# Patient Record
Sex: MALE | Race: WHITE | Employment: OTHER | URBAN - METROPOLITAN AREA
[De-identification: names, ages, dates, MRNs, and addresses within clinical notes are randomized per-mention and may not be internally consistent; named-entity substitution may affect disease eponyms.]

---

## 2017-06-02 DIAGNOSIS — N18.30 CHRONIC KIDNEY DISEASE, STAGE III (MODERATE) (HCC): ICD-10-CM

## 2017-06-02 DIAGNOSIS — E55.9 VITAMIN D DEFICIENCY: ICD-10-CM

## 2017-06-02 DIAGNOSIS — R73.01 IFG (IMPAIRED FASTING GLUCOSE): ICD-10-CM

## 2017-06-02 DIAGNOSIS — E78.5 DYSLIPIDEMIA: ICD-10-CM

## 2017-06-02 RX ORDER — LISINOPRIL 5 MG/1
TABLET ORAL
Qty: 90 TAB | Refills: 2 | Status: SHIPPED | OUTPATIENT
Start: 2017-06-02 | End: 2018-02-22 | Stop reason: SDUPTHER

## 2017-06-14 ENCOUNTER — OFFICE VISIT (OUTPATIENT)
Dept: INTERNAL MEDICINE CLINIC | Age: 82
End: 2017-06-14

## 2017-06-14 VITALS
BODY MASS INDEX: 24.48 KG/M2 | TEMPERATURE: 97.6 F | WEIGHT: 171 LBS | RESPIRATION RATE: 16 BRPM | HEIGHT: 70 IN | HEART RATE: 78 BPM | SYSTOLIC BLOOD PRESSURE: 107 MMHG | DIASTOLIC BLOOD PRESSURE: 69 MMHG | OXYGEN SATURATION: 96 %

## 2017-06-14 DIAGNOSIS — K21.9 GASTROESOPHAGEAL REFLUX DISEASE WITHOUT ESOPHAGITIS: ICD-10-CM

## 2017-06-14 DIAGNOSIS — K59.04 CHRONIC IDIOPATHIC CONSTIPATION: ICD-10-CM

## 2017-06-14 DIAGNOSIS — E78.5 DYSLIPIDEMIA: ICD-10-CM

## 2017-06-14 DIAGNOSIS — C61 PROSTATE CANCER (HCC): ICD-10-CM

## 2017-06-14 DIAGNOSIS — M54.50 CHRONIC BILATERAL LOW BACK PAIN WITHOUT SCIATICA: ICD-10-CM

## 2017-06-14 DIAGNOSIS — G89.29 CHRONIC BILATERAL LOW BACK PAIN WITHOUT SCIATICA: ICD-10-CM

## 2017-06-14 DIAGNOSIS — N18.30 CHRONIC KIDNEY DISEASE, STAGE III (MODERATE) (HCC): Primary | ICD-10-CM

## 2017-06-14 RX ORDER — OXYCODONE AND ACETAMINOPHEN 7.5; 325 MG/1; MG/1
1 TABLET ORAL
Qty: 30 TAB | Refills: 0 | Status: SHIPPED | OUTPATIENT
Start: 2017-06-14 | End: 2019-04-08

## 2017-06-14 RX ORDER — TRAMADOL HYDROCHLORIDE 50 MG/1
TABLET ORAL
Qty: 540 TAB | Refills: 1 | Status: SHIPPED | OUTPATIENT
Start: 2017-06-14 | End: 2017-12-13 | Stop reason: SDUPTHER

## 2017-06-14 NOTE — MR AVS SNAPSHOT
Visit Information Date & Time Provider Department Dept. Phone Encounter #  
 6/14/2017  1:00 PM Rafat Frederick, [address] [place] [address],[address] [phone] [id number] Follow-up Instructions Return in about 6 months (around 12/18/2017). Upcoming Health Maintenance Date Due DTaP/Tdap/Td series (1 - Tdap) 6/17/1956 Pneumococcal 65+ High/Highest Risk (2 of 2 - PPSV23) 10/15/2015 GLAUCOMA SCREENING Q2Y 12/29/2016 INFLUENZA AGE 9 TO ADULT 8/1/2017 MEDICARE YEARLY EXAM 12/15/2017 Allergies as of 6/14/2017  Review Complete On: 6/14/2017 By: Rafat Frederick MD  
 No Known Allergies Current Immunizations  Reviewed on 6/14/2017 Name Date Influenza High Dose Vaccine PF 10/19/2016 Influenza Vaccine 10/21/2014, 9/27/2013 Influenza Vaccine (Quad) PF 10/15/2015 Influenza Vaccine Split 10/25/2012, 10/21/2011 10:54 AM, 10/15/2010 Pneumococcal Vaccine (Unspecified Type) 10/15/2010 Reviewed by Rafat Frederick MD on 6/14/2017 at  1:21 PM  
You Were Diagnosed With   
  
 Codes Comments Chronic kidney disease, stage III (moderate)    -  Primary ICD-10-CM: N18.3 ICD-9-CM: 814. 3 Chronic idiopathic constipation     ICD-10-CM: K59.04 
ICD-9-CM: 564.00 Gastroesophageal reflux disease without esophagitis     ICD-10-CM: K21.9 ICD-9-CM: 530.81 Prostate cancer [facility])     ICD-10-CM: U38 ICD-9-CM: 408 Dyslipidemia     ICD-10-CM: E78.5 ICD-9-CM: 272.4 Vitals BP Pulse Temp Resp Height(growth percentile) Weight(growth percentile) 107/69 (BP 1 Location: Left arm, BP Patient Position: Sitting) 78 97.6 °F (36.4 °C) (Oral) 16 5' 10\" (1.778 m) 171 lb (77.6 kg) SpO2 BMI Smoking Status 96% 24.54 kg/m2 Former Smoker Vitals History BMI and BSA Data Body Mass Index Body Surface Area 24.54 kg/m 2 1.96 m 2 Preferred Pharmacy Pharmacy Name Phone DINORA GUILLEN [facility] [address], [address] [place] [address] Arely Deacon [phone] Your Updated Medication List  
  
   
This list is accurate as of: 6/14/17  1:25 PM.  Always use your most recent med list.  
  
  
  
  
 acetaminophen 500 mg tablet Commonly known as:  TYLENOL Take 1,000 mg by mouth three (3) times daily. alum-mag hydroxide-simeth 200-200-20 mg/5 mL Susp Commonly known as:  MYLANTA Take 30 mL by mouth daily as needed. CLEAR EYES REDNESS RELIEF 0.012-0.2 % Drop Generic drug:  Naphazoline-Gly-Benzalk Chl  
Apply 2 Drops to eye daily. Both eyes  
  
 levocetirizine 5 mg tablet Commonly known as:  Jamse Irene TAKEK ONE TABLET BY MOUTH NIGHTLY  
  
 lisinopril 5 mg tablet Commonly known as:  PRINIVIL, ZESTRIL  
TAKE ONE TABLET BY MOUTH DAILY  
  
 omeprazole 20 mg capsule Commonly known as:  PRILOSEC Take 1 Cap by mouth two (2) times a day. oxyCODONE-acetaminophen 7.5-325 mg per tablet Commonly known as:  PERCOCET 7.5 Take 1 Tab by mouth every eight (8) hours as needed for Pain. Max Daily Amount: 3 Tabs. traMADol 50 mg tablet Commonly known as:  ULTRAM  
TAKE TWO TABLETS BY MOUTH THREE TIMES A DAY WITH MEALS Prescriptions Printed Refills  
 oxyCODONE-acetaminophen (PERCOCET 7.5) 7.5-325 mg per tablet 0 Sig: Take 1 Tab by mouth every eight (8) hours as needed for Pain. Max Daily Amount: 3 Tabs. Class: Print Route: Oral  
 traMADol (ULTRAM) 50 mg tablet 1 Sig: TAKE TWO TABLETS BY MOUTH THREE TIMES A DAY WITH MEALS Class: Print We Performed the Following CBC W/O DIFF [42369 CPT(R)] LIPID PANEL [28197 CPT(R)] METABOLIC PANEL, COMPREHENSIVE [41438 CPT(R)] TSH 3RD GENERATION [95970 CPT(R)] Follow-up Instructions Return in about 6 months (around 12/18/2017). Patient Instructions Call dr Theo Correa for follow up Introducing [facility]! Robbin martinez Thumbs Up patient portal. Now you can access parts of your medical record, email your doctor's office, and request medication refills online. 1. In your internet browser, go to [email or web address] 2. Click on the First Time User? Click Here link in the Sign In box. You will see the New Member Sign Up page. 3. Enter your Thumbs Up Access Code exactly as it appears below. You will not need to use this code after youve completed the sign-up process. If you do not sign up before the expiration date, you must request a new code. · Thumbs Up Access Code: [email or web address] Expires: 9/12/2017  1:23 PM 
 
4. Enter the last four digits of your Social Security Number (xxxx) and Date of Birth (mm/dd/yyyy) as indicated and click Submit. You will be taken to the next sign-up page. 5. Create a Thumbs Up ID. This will be your Thumbs Up login ID and cannot be changed, so think of one that is secure and easy to remember. 6. Create a Thumbs Up password. You can change your password at any time. 7. Enter your Password Reset Question and Answer. This can be used at a later time if you forget your password. 8. Enter your e-mail address. You will receive e-mail notification when new information is available in [address] 9. Click Sign Up. You can now view and download portions of your medical record. 10. Click the Download Summary menu link to download a portable copy of your medical information. If you have questions, please visit the Frequently Asked Questions section of the Thumbs Up website. Remember, Thumbs Up is NOT to be used for urgent needs. For medical emergencies, dial 911. Now available from your iPhone and Android! Please provide this summary of care documentation to your next provider. Your primary care clinician is listed as Rosanna Villanueva. If you have any questions after today's visit, please call [phone].

## 2017-06-14 NOTE — LETTER
AGREEMENT for controlled medication treatment Maggie Bo, have agreed to a course of treatment that includes taking controlled medication. For this treatment I designate _____________________________________ as the [facility] Provider.  The purpose of this agreement is to prevent misunderstandings about controlled medications I may be taking for treatment, and to comply with applicable laws. I understand this agreement is essential to the trust and confidence necessary in a provider-patient relationship and that the designated provider will treat me in accordance with the statements below. As part of my treatment I agree to the followin.  USE 
a. I will take the controlled medication as instructed by the designated provider and avoid improper use of controlled medications. b.   I will not share, sell or trade controlled medication with anyone as this is a criminal offense.  
c.   I will not use any illegal controlled substance, including marijuana, cocaine, etc. as this is a criminal offense. 2.  PROVIDERS 
a. I will only obtain controlled medication from the designated provider. b.   I will not attempt to obtain the same or similar controlled medications, such as opioid pain medication, stimulants, anti-anxiety or hypnotics from any other provider as this is a criminal offense. 
c.   I will inform the designated provider about all other licensed professionals providing medical care to me and authorize communication between all providers to coordinate care, particularly prescribing or dispensing of controlled medications. 3.  PHARMACY 
a.   I will only fill controlled medication prescriptions at the approved pharmacy as listed below. 4.  OFFICE VISITS 
a. I agree to attend scheduled office visit appointments. b.   I am aware my office visits may be monthly or as determined necessary by the designated provider. c.   I will communicate fully with the designated provider about the character and intensity of my symptoms, the effect of the symptoms on my daily life, and how well the controlled medication is helping to relieve the cause of my symptoms. 5.  REFILLS OR CALL-IN PRESCRIPTIONS OF CONTROLLED MEDICATIONS 
a. I agree that refills of my prescriptions for controlled medications will be made at the time of an office visit during regular office hours. No refills will be available during evenings or on weekends. Abusive or inappropriate behavior related to medication refills will not be tolerated. b.   I will not call the office repeatedly to inquire about my controlled medication. I understand that medications will be written on the due date and not before. Calling the office repeatedly will be considered harassment, and I may be discharged from the practice. c.   Controlled medications may not be called in for refill, but doing so is at the discretion of the designated provider. d.   I am aware that only I must  prescriptions for controlled medications at the office but the designated provider may allow a designee to  the prescription from the office under very specific circumstance that may develop. 6.  TOXICOLOGY SCREENING 
a. I agree to random urine toxicology screenings in order to comply with government and [address] [place] [address] regulations. I understand that I will be financially responsible for any charges incurred by this office, Mily Duncan of [facility] laboratory, [employer], or [facility] for the urine toxicology screening, which may not be covered by my insurance. Failure to do so will be considered non-compliance and I may be discharged. b. In some cases an oral swab or hair sample may be substituted for a urine screen. This is at the discretion of the designated provider. I understand that I will be financially responsible for any charges incurred as well. c.   I understand that if the urine toxicology does not show my medications prescribed to me by the designated provider, or it shows any illegal substance or any other medications NOT prescribed by the designated providers, I may be discharged from this practice at the discretion of the designated provider and no further controlled medications will be prescribed or follow-up appointments scheduled. Also, if any illegal substances are detected on the urine toxicology, this information may be provided to local law enforcement. 7. PILL COUNTS 
a. I am aware I may be called at any time to come into the office for a count of all my remaining controlled medications in order to help the designated provider understand the rate at which I use my controlled medications and to more effectively adjust dosage. b. I agree that I will use my medication at a rate NO greater than the prescribed rate and that use of my medication at a greater rate will result in my being without medication for the period of time until next expected due date. c. I will bring in the containers with the medication prescribed by all providers, including the designated provider, to each office visit even if there is no medication remaining. All controlled medication will be in the original containers from the pharmacy for each medication. Failure to do so will be considered non-compliance and I may be discharged from the practice. 8.  LOSS OR THEFT OF MEDICATION 
a. I will safeguard my controlled medication from loss or theft. b.   Lost or stolen controlled medication may not be replaced. This includes a prescription that has not yet been filled at the pharmacy. c.   In the event my controlled medications are stolen or lost, I will notify the designated providers office immediately.   If such event occurs during the night, weekend or holiday, I will leave a detailed message on the answering machine or answering service at the number listed above. 
d.   I will file and produce an official police report for any effort to replace controlled medications prescribed. 9.  AGENCY COLLABORATION I authorize the designated provider and the authorized pharmacy/pharmacist to cooperate fully with any city, state, or federal law enforcement agency in the investigation of any possible misuse, sale or other diversion of my controlled medication. 10.  TREATMENT I understand that if I violate any of the conditions, my controlled medication and/or treatment will be terminated. If the violation involves obtaining controlled substances and/or dangerous drugs from another source, the incident may be reported to other medical facilities and authorities, including law enforcement. In this case, the designated provider may taper off the medication over a period of several days, as necessary, to avoid withdrawal symptoms or will suggest alternate treatment facilities. Also, a drug dependence treatment program may be recommended. 11.  AGREEMENT I agree to follow these guidelines that have been fully explained to me. All of my questions and concerns regarding treatment have been adequately answered. A copy of this document has been given to me. I agree to use ______________________________ Authorized Pharmacy located at _________________________________________________________________ Telephone ________________________________for filling ALL controlled medication prescriptions. This agreement is entered into on 6/14/2017 Patient signature__________________________________________________________ Legal Guardian signature___________________________________________________ Provider signature_________________________________________________________ Witness signature_________________________________________________________

## 2017-06-14 NOTE — PROGRESS NOTES
HISTORY OF PRESENT ILLNESS  Ridge Erickson is a 80 y.o. male. HPI  Last here 12/14/16. Pt is here to f/u on chronic conditions. Pt is here with his wife today.      BP is good today 107/69  Continues on lisinopril 5mg to protect kidneys     Continues on prilosec BID and this controls sxs well , gets very rare breakthrough sx, only if he forgets to take it, takes it 30min prior to meals which works quite well  Has been taking prilosec BID for the past 2 years  Ham gives him heartburn unless he takes prilosec. Reviewed last labs 12/16  Will recheck labs today  Pt is not fasting  He will return tomorrow for labs     Pt no longer follows with Dr. Ange Mcnally for allergies, xyzol for allergies, works well overall, takes daily, needs rf, he is allergic to a lot of fungus and dog and cat hair.     Pt continues on tramadol 2 with each meal everyday for pain--6 per day   Gets 540 tabs per 3 months. Pt also has percocet to use prn for severe back pain, infrequently, has not needed much of this, rf last visit has only taken 3-4  He took 2 percocet last week d/t severe pain but did not like how it made him feel. He would like a prescription for 7.5 mg percocet. Lays on floor to help his back at times  He has occasional constipation that is improved with OTC stool softeners. Pt reports this works well, denies any SE--no sedation, no signs of abuse  Provided pt pain contract today - pt signed     Pt follows with Dr. Natacha Bryant annually for prostate cancer  Recall last note 3/30/16:  PSA undetectable, pt doing well no further uti. Of note, pt had prostate removed  He has not seen Dr. Natacha Bryant this year -- advised to f/u     Pt was referred to Dr. Lorraine Rodgers (rheum) at last visit, but has not followed up with her. Planning to schedule appointment. 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 He continues to take tramadol for this 6 tabs per day.  Gets 540 tabs for 3 months. --i prescribe this now, we addressed this last time and he stated he still gets pain and does not think the tramadol does anything but takes it for maintenance anyway            Recall he Has n dx of prostate cancer since January 2013 , sees calin annually and last saw him in January 2015--states he was released from care at that time.    Recall the patient has h/o ckd, last cr 1.5 continues to be stable. , prior was 1.77, uns 1.5-1.77  Recall that he Used to follow with rheumatologist dr Rama Aguilera years ago --currently not seeing anyone. Recall the patient has hansens and a h/o stomach ulcer, sees ry for this. Sx controlled with prilosec bid , he states he saw him last year or the year before, sx well controlled, only one or 2 breakthrough episodes.         PREVENTIVE:    Colonoscopy: 10/22/13, Dr. Tabitha Parada, repeat 10 years  EGD: 4/16/16,Dr Banerjee, repeat 3 years  PSA: 1/13 dx prostate cancer, Dr. Raul Romeo, 3/16  AAA screen: 10/10 negative  Tdap: declines    Pneumovax 10/10    prevnar 13: given today 6/14/17  Zostavax. Insurance wouldn't cover.  Will wait for now.    Flu shot oct 19 2016  Eye: Dr. Solis Lei 1/17   Lipids: 6/16   A1c: 6/16 5.9, 12/16 5.7    Patient Active Problem List    Diagnosis Date Noted    ACP (advance care planning) 12/11/2015    Prostate cancer ([facility] [address] 02/18/2013    Syncope 08/21/2011    Chronic kidney disease, stage III (moderate) 06/28/2011    Vitamin D deficiency 06/27/2011    DJD (degenerative joint disease), multiple sites 03/09/2010    GERD (gastroesophageal reflux disease) 03/09/2010    History of peptic ulcer disease 03/09/2010    BPH (benign prostatic hypertrophy) 03/09/2010    Perennial allergic rhinitis 03/09/2010     Current Outpatient Prescriptions   Medication Sig Dispense Refill    lisinopril (PRINIVIL, ZESTRIL) 5 mg tablet TAKE ONE TABLET BY MOUTH DAILY 90 Tab 2    levocetirizine (XYZAL) 5 mg tablet TAKEK ONE TABLET BY MOUTH NIGHTLY 90 Tab 2    traMADol (ULTRAM) 50 mg tablet TAKE TWO TABLETS BY MOUTH THREE TIMES A DAY WITH MEALS 540 Tab 1    omeprazole (PRILOSEC) 20 mg capsule Take 1 Cap by mouth two (2) times a day. 180 Cap 3    Naphazoline-Gly-Benzalk Chl (CLEAR EYES REDNESS RELIEF) 0.012-0.2 % drop Apply 2 Drops to eye daily. Both eyes      alum-mag hydroxide-simeth (MYLANTA) 200-200-20 mg/5 mL susp Take 30 mL by mouth daily as needed.  oxyCODONE-acetaminophen (PERCOCET 10)  mg per tablet Take 1 Tab by mouth every eight (8) hours as needed for Pain. Max Daily Amount: 3 Tabs. 25 Tab 0    acetaminophen (TYLENOL) 500 mg tablet Take 1,000 mg by mouth three (3) times daily.        Past Surgical History:   Procedure Laterality Date    ENDOSCOPY, COLON, DIAGNOSTIC  2006    by [facility] MD; normal    HX CATARACT REMOVAL  12/28/2014    both eyes    HX COLONOSCOPY  10/22/2013    HX ENDOSCOPY      Followed by Dr. Evin Evans (multiple)    HX HEENT  1970's    septoplasty    HX ORTHOPAEDIC  2000    right rotator cuff repair    HX ORTHOPAEDIC      right knee total replacement    HX PROSTATECTOMY  2010    green light laser by Dr. Otto Blevins HX UROLOGICAL  5/7/13    CYSTOSCOPY WITH OPTICAL INTERNAL URETHROTOMY, AND CRYOABLATION OF PROSTATE      Lab Results  Component Value Date/Time   WBC 6.1 06/14/2016 01:48 PM   HGB 13.1 06/14/2016 01:48 PM   HCT 40.0 06/14/2016 01:48 PM   PLATELET 439 64/40/3269 01:48 PM   MCV 96 06/14/2016 01:48 PM     Lab Results  Component Value Date/Time   Cholesterol, total 163 06/14/2016 01:48 PM   HDL Cholesterol 36 06/14/2016 01:48 PM   LDL, calculated 98 06/14/2016 01:48 PM   Triglyceride 144 06/14/2016 01:48 PM   CHOL/HDL Ratio 3.6 06/25/2010 08:36 AM     Lab Results  Component Value Date/Time   GFR est non-AA 40 12/14/2016 01:45 PM   GFR est AA 46 12/14/2016 01:45 PM   Creatinine 1.59 12/14/2016 01:45 PM   BUN 34 12/14/2016 01:45 PM   Sodium 142 12/14/2016 01:45 PM   Potassium 5.3 12/14/2016 01:45 PM   Chloride 103 12/14/2016 01:45 PM   CO2 25 12/14/2016 01:45 PM        Review of Systems   Respiratory: Negative for shortness of breath. Cardiovascular: Negative for chest pain. Physical Exam   Constitutional: He is oriented to person, place, and time. He appears well-developed and well-nourished. No distress. HENT:   Head: Normocephalic and atraumatic. Mouth/Throat: Oropharynx is clear and moist. No oropharyngeal exudate. Eyes: Conjunctivae and EOM are normal. Right eye exhibits no discharge. Left eye exhibits no discharge. Neck: Normal range of motion. Neck supple. No carotid bruits   Cardiovascular: Normal rate, regular rhythm, normal heart sounds and intact distal pulses. Exam reveals no gallop and no friction rub. No murmur heard. Pulmonary/Chest: Effort normal and breath sounds normal. No respiratory distress. He has no wheezes. He has no rales. He exhibits no tenderness. Musculoskeletal: He exhibits no edema, tenderness or deformity. Lymphadenopathy:     He has no cervical adenopathy. Neurological: He is alert and oriented to person, place, and time. He has normal reflexes. Coordination abnormal.   Skin: Skin is warm and dry. No rash noted. He is not diaphoretic. No erythema. No pallor. Psychiatric: He has a normal mood and affect. His behavior is normal.       ASSESSMENT and PLAN    ICD-10-CM ICD-9-CM    1. Chronic kidney disease, stage III (moderate)    Crt runs around 1.7-1.8 baseline. Will check metabolic panel today. Continue to monitor. S71.7 793.2 METABOLIC PANEL, COMPREHENSIVE      CBC W/O DIFF      LIPID PANEL      TSH 3RD GENERATION   2. Chronic idiopathic constipation    Improved with OTC stool softeners. Y38.77 065.78 METABOLIC PANEL, COMPREHENSIVE      CBC W/O DIFF      LIPID PANEL      TSH 3RD GENERATION   3. Gastroesophageal reflux disease without esophagitis    Takes prilosec BID. Last EGD was 4/16. Overall this controls his sxs.   H37.0 163.83 METABOLIC PANEL, COMPREHENSIVE      CBC W/O DIFF      LIPID PANEL      TSH 3RD GENERATION   4. Prostate cancer [facility])    Due to see Dr. Indira Garrett. Advised to schedule f/u. O25 064 METABOLIC PANEL, COMPREHENSIVE      CBC W/O DIFF      LIPID PANEL      TSH 3RD GENERATION   5. Dyslipidemia    Diet controlled. Check lipids today. T91.8 464.7 METABOLIC PANEL, COMPREHENSIVE      CBC W/O DIFF      LIPID PANEL      TSH 3RD GENERATION    6. Chronic back pain  Pt takes tramadol 6 tabs per day. Gets 540 tabs for 3 months supply. Pt signed pain contract today. Reviewed parameters of prescribing. Reviewed , no signs of abuse. Also has supply of percocet that he rarely uses. Currently prescribed 10mg oxycodone, but would like a reduced dose of 7.5mg. I have ordered this. Written by Anmol Bacon, as dictated by Carlita Leonard MD.     Current diagnosis and concerns discussed with pt at length. Understands risks and benefits or current treatment plan and medications and accepts the treatment and medication with any possible risks.   Pt asks appropriate questions which were answered.   Pt instructed to call with any concerns or problems. This note will not be viewable in [address]

## 2017-06-15 ENCOUNTER — APPOINTMENT (OUTPATIENT)
Dept: INTERNAL MEDICINE CLINIC | Age: 82
End: 2017-06-15

## 2017-06-16 LAB
ALBUMIN SERPL-MCNC: 4.5 G/DL (ref 3.5–4.7)
ALBUMIN/GLOB SERPL: 1.8 {RATIO} (ref 1.2–2.2)
ALP SERPL-CCNC: 84 IU/L (ref 39–117)
ALT SERPL-CCNC: 12 IU/L (ref 0–44)
AST SERPL-CCNC: 19 IU/L (ref 0–40)
BILIRUB SERPL-MCNC: 0.7 MG/DL (ref 0–1.2)
BUN SERPL-MCNC: 39 MG/DL (ref 8–27)
BUN/CREAT SERPL: 23 (ref 10–24)
CALCIUM SERPL-MCNC: 9.5 MG/DL (ref 8.6–10.2)
CHLORIDE SERPL-SCNC: 103 MMOL/L (ref 96–106)
CHOLEST SERPL-MCNC: 168 MG/DL (ref 100–199)
CO2 SERPL-SCNC: 21 MMOL/L (ref 18–29)
CREAT SERPL-MCNC: 1.71 MG/DL (ref 0.76–1.27)
ERYTHROCYTE [DISTWIDTH] IN BLOOD BY AUTOMATED COUNT: 13.4 % (ref 12.3–15.4)
GLOBULIN SER CALC-MCNC: 2.5 G/DL (ref 1.5–4.5)
GLUCOSE SERPL-MCNC: 94 MG/DL (ref 65–99)
HCT VFR BLD AUTO: 40.7 % (ref 37.5–51)
HDLC SERPL-MCNC: 43 MG/DL
HGB BLD-MCNC: 13.5 G/DL (ref 12.6–17.7)
LDLC SERPL CALC-MCNC: 103 MG/DL (ref 0–99)
MCH RBC QN AUTO: 32 PG (ref 26.6–33)
MCHC RBC AUTO-ENTMCNC: 33.2 G/DL (ref 31.5–35.7)
MCV RBC AUTO: 96 FL (ref 79–97)
PLATELET # BLD AUTO: 186 X10E3/UL (ref 150–379)
POTASSIUM SERPL-SCNC: 4.9 MMOL/L (ref 3.5–5.2)
PROT SERPL-MCNC: 7 G/DL (ref 6–8.5)
RBC # BLD AUTO: 4.22 X10E6/UL (ref 4.14–5.8)
SODIUM SERPL-SCNC: 142 MMOL/L (ref 134–144)
TRIGL SERPL-MCNC: 111 MG/DL (ref 0–149)
TSH SERPL DL<=0.005 MIU/L-ACNC: 0.39 UIU/ML (ref 0.45–4.5)
VLDLC SERPL CALC-MCNC: 22 MG/DL (ref 5–40)
WBC # BLD AUTO: 7.6 X10E3/UL (ref 3.4–10.8)

## 2017-06-17 LAB
SPECIMEN STATUS REPORT, ROLRST: NORMAL
T4 FREE SERPL-MCNC: 1.29 NG/DL (ref 0.82–1.77)

## 2017-06-19 NOTE — PROGRESS NOTES
Let him know kidney fxn stable    Subclinical hyperthyroid stable--will monitor no meds or additional tx needed

## 2017-06-20 NOTE — PROGRESS NOTES
Called and spoke to pt. Two identifiers confirmed. Reported to pt that kidney fxn is stable and hyperthyroid is stable, no need for med or additional tx needed. Pt confirmed understanding. Pt had no further questions.

## 2017-12-12 ENCOUNTER — DOCUMENTATION ONLY (OUTPATIENT)
Dept: INTERNAL MEDICINE CLINIC | Age: 82
End: 2017-12-12

## 2017-12-12 NOTE — PROGRESS NOTES
Medicare Part B Preventive Services  Limitations  Recommendation  Scheduled    Bone Mass Measurement  (age 72 & older, biennial)  Requires diagnosis related to osteoporosis or estrogen deficiency. Biennial benefit unless patient has history of long-term glucocorticoid tx or baseline is needed because initial test was by other method  N/A  N/A    Cardiovascular Screening Blood Tests (every 5 years)  Total cholesterol, HDL, Triglycerides  Order as a panel if possible  Completed 6/2017    Recommended annually  Due 6/2018   Colorectal Cancer Screening  -Fecal occult blood test (annual)  -Flexible sigmoidoscopy (5y)  -Screening colonoscopy (10y)  -Barium Enema    Completed 10/2013 with Dr. Uyen Olsen    Recommended in 10 years  Due 10/2023    Counseling to Prevent Tobacco Use (up to 8 sessions per year)  - Counseling greater than 3 and up to 10 minutes  - Counseling greater than 10 minutes  Patients must be asymptomatic of tobacco-related conditions to receive as preventive service  N/A  N/A    Diabetes Screening Tests (at least every 3 years, Medicare covers annually or at 6-month intervals for prediabetic patients)     Fasting blood sugar (FBS) or glucose tolerance test (GTT)  Patient must be diagnosed with one of the following:  -Hypertension, Dyslipidemia, obesity, previous impaired FBS or GTT  Or any two of the following: overweight, FH of diabetes, age ? 72, history of gestational diabetes, birth of baby weighing more than 9 pounds  Completed 6/2017 with GLU 94    Recommended every 3 years for non-diabetics  Due 6/2018    Typically checked annually   Diabetes Self-Management Training (DSMT) (no USPSTF recommendation)  Requires referral by treating physician for patient with diabetes or renal disease. 10 hours of initial DSMT session of no less than 30 minutes each in a continuous 12-month period. 2 hours of follow-up DSMT in subsequent years.   N/A  N/A    Glaucoma Screening (no USPSTF recommendation)  Diabetes mellitus, family history, , age 48 or over,  American, age 72 or over  Completed 1/2017 with Dr. Jared Lockhart    Recommended annually  Due 1/2018   Human Immunodeficiency Virus (HIV) Screening (annually for increased risk patients)  HIV-1 and HIV-2 by EIA, ALFREDO, rapid antibody test, or oral mucosa transudate  Patient must be at increased risk for HIV infection per USPSTF guidelines or pregnant. Tests covered annually for patients at increased risk. Pregnant patients may receive up to 3 test during pregnancy. N/A  N/A    Medical Nutrition Therapy (MNT) (for diabetes or renal disease not recommended schedule)  Requires referral by treating physician for patient with diabetes or renal disease. Can be provided in same year as diabetes self-management training (DSMT), and CMS recommends medical nutrition therapy take place after DSMT. Up to 3 hours for initial year and 2 hours in subsequent years. N/A  N/A    Prostate Cancer Screening (annually up to age 76)  - Digital rectal exam (ISAEL)  - Prostate specific antigen (PSA)  Annually (age 48 or over), ISAEL not paid separately when covered E/M service is provided on same date  Completed 3/2016 with Dr. Margy Drake  Due as directed by Dr. Claudette Rodriguez Vaccination (annually)    Completed 10/2016    Recommended annually  Due Fall 2017   Pneumococcal Vaccination (once after 72)    Pneumovax - 10/2010    Prevnar 13 - 6/2017    Both recommended once over the age of 72  Completed     Completed    Hepatitis B Vaccinations (if medium/high risk)  Medium/high risk factors: End-stage renal disease,  Hemophiliacs who received Factor VIII or IX concentrates, Clients of institutions for the mentally retarded, Persons who live in the same house as a HepB virus carrier, Homosexual men, Illicit injectable drug abusers. N/A  N/A    Screening Mammography (biennial age 54-69)?   Annually (age 36 or over)  N/A N/A    Screening Pap Tests and Pelvic Examination (up to age 79 and after 79 if unknown history or abnormal study last 10 years)  Every 24 months except high risk  N/A  N/A    Ultrasound Screening for Abdominal Aortic Aneurysm (AAA) (once)  Patient must be referred through IPPE and not have had a screening for abdominal aortic aneurysm before under Medicare.  Limited to patients who meet one of the following criteria:  - Men who are 73-68 years old and have smoked more than 100 cigarettes in their lifetime.  -Anyone with a FH of AAA  -Anyone recommended for screening by USPSTF  Completed 10/2010 - negative for AAA  Completed

## 2017-12-13 ENCOUNTER — OFFICE VISIT (OUTPATIENT)
Dept: INTERNAL MEDICINE CLINIC | Age: 82
End: 2017-12-13

## 2017-12-13 VITALS
HEIGHT: 70 IN | HEART RATE: 72 BPM | TEMPERATURE: 97.8 F | WEIGHT: 170 LBS | OXYGEN SATURATION: 98 % | BODY MASS INDEX: 24.34 KG/M2 | SYSTOLIC BLOOD PRESSURE: 134 MMHG | RESPIRATION RATE: 16 BRPM | DIASTOLIC BLOOD PRESSURE: 73 MMHG

## 2017-12-13 DIAGNOSIS — R35.0 URINARY FREQUENCY: ICD-10-CM

## 2017-12-13 DIAGNOSIS — C61 PROSTATE CANCER (HCC): ICD-10-CM

## 2017-12-13 DIAGNOSIS — E78.5 DYSLIPIDEMIA: ICD-10-CM

## 2017-12-13 DIAGNOSIS — Z23 ENCOUNTER FOR IMMUNIZATION: ICD-10-CM

## 2017-12-13 DIAGNOSIS — J30.89 PERENNIAL ALLERGIC RHINITIS: ICD-10-CM

## 2017-12-13 DIAGNOSIS — Z00.00 MEDICARE ANNUAL WELLNESS VISIT, SUBSEQUENT: ICD-10-CM

## 2017-12-13 DIAGNOSIS — K59.04 CHRONIC IDIOPATHIC CONSTIPATION: ICD-10-CM

## 2017-12-13 DIAGNOSIS — I10 ESSENTIAL HYPERTENSION: ICD-10-CM

## 2017-12-13 DIAGNOSIS — E55.9 VITAMIN D DEFICIENCY: ICD-10-CM

## 2017-12-13 DIAGNOSIS — E05.90 HYPERTHYROIDISM: ICD-10-CM

## 2017-12-13 DIAGNOSIS — K21.9 GASTROESOPHAGEAL REFLUX DISEASE WITHOUT ESOPHAGITIS: ICD-10-CM

## 2017-12-13 DIAGNOSIS — N18.30 CHRONIC KIDNEY DISEASE, STAGE III (MODERATE) (HCC): Primary | ICD-10-CM

## 2017-12-13 RX ORDER — LEVOCETIRIZINE DIHYDROCHLORIDE 5 MG/1
TABLET, FILM COATED ORAL
Qty: 90 TAB | Refills: 2 | Status: SHIPPED | OUTPATIENT
Start: 2017-12-13 | End: 2018-08-31 | Stop reason: SDUPTHER

## 2017-12-13 RX ORDER — TRAMADOL HYDROCHLORIDE 50 MG/1
TABLET ORAL
Qty: 540 TAB | Refills: 1 | Status: SHIPPED | OUTPATIENT
Start: 2017-12-13 | End: 2018-08-31 | Stop reason: SDUPTHER

## 2017-12-13 RX ORDER — OMEPRAZOLE 20 MG/1
20 CAPSULE, DELAYED RELEASE ORAL 2 TIMES DAILY
Qty: 180 CAP | Refills: 3 | Status: SHIPPED | OUTPATIENT
Start: 2017-12-13 | End: 2019-04-08

## 2017-12-13 RX ORDER — LACTULOSE 10 G/15ML
1 SOLUTION ORAL; RECTAL 3 TIMES DAILY
Qty: 480 ML | Refills: 1 | Status: SHIPPED | OUTPATIENT
Start: 2017-12-13 | End: 2018-12-18 | Stop reason: SDUPTHER

## 2017-12-13 NOTE — PATIENT INSTRUCTIONS
Medicare Part B Preventive Services  Limitations  Recommendation  Scheduled    Bone Mass Measurement  (age 72 & older, biennial)  Requires diagnosis related to osteoporosis or estrogen deficiency. Biennial benefit unless patient has history of long-term glucocorticoid tx or baseline is needed because initial test was by other method  N/A  N/A    Cardiovascular Screening Blood Tests (every 5 years)  Total cholesterol, HDL, Triglycerides  Order as a panel if possible  Completed 6/2017     Recommended annually  Due 6/2018   Colorectal Cancer Screening  -Fecal occult blood test (annual)  -Flexible sigmoidoscopy (5y)  -Screening colonoscopy (10y)  -Barium Enema     Completed 10/2013 with Dr. Archana Carvalho     Recommended in 10 years  Due 10/2023    Counseling to Prevent Tobacco Use (up to 8 sessions per year)  - Counseling greater than 3 and up to 10 minutes  - Counseling greater than 10 minutes  Patients must be asymptomatic of tobacco-related conditions to receive as preventive service  N/A  N/A    Diabetes Screening Tests (at least every 3 years, Medicare covers annually or at 6-month intervals for prediabetic patients)      Fasting blood sugar (FBS) or glucose tolerance test (GTT)  Patient must be diagnosed with one of the following:  -Hypertension, Dyslipidemia, obesity, previous impaired FBS or GTT  Or any two of the following: overweight, FH of diabetes, age ? 72, history of gestational diabetes, birth of baby weighing more than 9 pounds  Completed 6/2017 with GLU 94     Recommended every 3 years for non-diabetics  Due 6/2018     Typically checked annually   Diabetes Self-Management Training (DSMT) (no USPSTF recommendation)  Requires referral by treating physician for patient with diabetes or renal disease. 10 hours of initial DSMT session of no less than 30 minutes each in a continuous 12-month period. 2 hours of follow-up DSMT in subsequent years.   N/A  N/A    Glaucoma Screening (no USPSTF recommendation)  Diabetes mellitus, family history, , age 48 or over,  American, age 72 or over  Completed 1/2017 with Dr. Selina Lugo     Recommended annually  Due 1/2018   Human Immunodeficiency Virus (HIV) Screening (annually for increased risk patients)  HIV-1 and HIV-2 by EIA, ALFREDO, rapid antibody test, or oral mucosa transudate  Patient must be at increased risk for HIV infection per USPSTF guidelines or pregnant. Tests covered annually for patients at increased risk. Pregnant patients may receive up to 3 test during pregnancy. N/A  N/A    Medical Nutrition Therapy (MNT) (for diabetes or renal disease not recommended schedule)  Requires referral by treating physician for patient with diabetes or renal disease. Can be provided in same year as diabetes self-management training (DSMT), and CMS recommends medical nutrition therapy take place after DSMT. Up to 3 hours for initial year and 2 hours in subsequent years. N/A  N/A    Prostate Cancer Screening (annually up to age 76)  - Digital rectal exam (ISAEL)  - Prostate specific antigen (PSA)  Annually (age 48 or over), ISAEL not paid separately when covered E/M service is provided on same date  Completed 3/2016 with Dr. Shonda Greco  Due as directed by Dr. Anais Nevarez Vaccination (annually)     Completed 12/17     Recommended annually  Due Fall 2018   Pneumococcal Vaccination (once after 65)     Pneumovax - 10/2010     Prevnar 13 - 6/2017     Both recommended once over the age of 72  Completed      Completed    Hepatitis B Vaccinations (if medium/high risk)  Medium/high risk factors: End-stage renal disease,  Hemophiliacs who received Factor VIII or IX concentrates, Clients of institutions for the mentally retarded, Persons who live in the same house as a HepB virus carrier, Homosexual men, Illicit injectable drug abusers. N/A  N/A    Screening Mammography (biennial age 54-69)?   Annually (age 36 or over)  N/A N/A    Screening Pap Tests and Pelvic Examination (up to age 79 and after 79 if unknown history or abnormal study last 10 years)  Every 24 months except high risk  N/A  N/A    Ultrasound Screening for Abdominal Aortic Aneurysm (AAA) (once)  Patient must be referred through IPPE and not have had a screening for abdominal aortic aneurysm before under Medicare. Limited to patients who meet one of the following criteria:  - Men who are 73-68 years old and have smoked more than 100 cigarettes in their lifetime.  -Anyone with a FH of AAA  -Anyone recommended for screening by USPSTF  Completed 10/2010 - negative for AAA  Completed         Please bring in a copy of your advanced directive to your next office visit so we can have a copy on file. Medicare Wellness Visit, Male    The best way to live healthy is to have a healthy lifestyle by eating a well-balanced diet, exercising regularly, limiting alcohol and stopping smoking. Regular physical exams and screening tests are another way to keep healthy. Preventive exams provided by your health care provider can find health problems before they become diseases or illnesses. Preventive services including immunizations, screening tests, monitoring and exams can help you take care of your own health. All people over age 72 should have a pneumovax  and and a prevnar shot to prevent pneumonia. These are once in a lifetime unless you and your provider decide differently. All people over 65 should have a yearly flu shot and a tetanus vaccine every 10 years. Screening for diabetes mellitus with a blood sugar test should be done every year. Glaucoma is a disease of the eye due to increased ocular pressure that can lead to blindness and it should be done every year by an eye professional.    Cardiovascular screening tests that check for elevated lipids (fatty part of blood) which can lead to heart disease and strokes should be done every 5 years.     Colorectal screening that evaluates for blood or polyps in your colon should be done yearly as a stool test or every five years as a flexible sigmoidoscope or every 10 years as a colonoscopy up to age 76. Men up to age 76 may need a screening blood test for prostate cancer at certain intervals, depending on their personal and family history. This decision is between the patient and his provider. If you have been a smoker or had family history of abdominal aortic aneurysms, you and your provider may decide to schedule an ultrasound test of your aorta. Hepatitis C screening is also recommended for anyone born between [birth year] through [address]. A shingles vaccine is also recommended once in a lifetime after age 61. Your Medicare Wellness Exam is recommended annually.     Here is a list of your current Health Maintenance items with a due date:  Health Maintenance Due   Topic Date Due    DTaP/Tdap/Td  (1 - Tdap) [birth year]/1956    Pneumococcal Vaccine (2 of 2 - PPSV23) 10/15/2015    Glaucoma Screening   12/29/2016    Flu Vaccine  08/01/2017

## 2017-12-13 NOTE — PROGRESS NOTES
This is a Subsequent Medicare Annual Wellness Exam (AWV) (Performed 12 months after IPPE or effective date of Medicare Part B enrollment)    I have reviewed the patient's medical history in detail and updated the computerized patient record. History     Past Medical History:   Diagnosis Date    Arthritis     severe diffuse DJD    Calculus of kidney 1988    2 bouts    Cancer [facility])     prostate    Chronic kidney disease 2010 urinary obstruction diagnosed    due to obstructive uropathy and ? NSAID use long term    GERD (gastroesophageal reflux disease) 6/2009    with mild Chin's    Hematuria, microscopic 1970's ?    negative cysto and imaging    Other ill-defined conditions 2006    blood transfusion hx    Perennial allergic rhinitis     mold --per Dr. Ramiro Whipple    PUD (peptic ulcer disease) 2006    stomach    Syncope 8/21/2011      Past Surgical History:   Procedure Laterality Date    ENDOSCOPY, COLON, DIAGNOSTIC  2006    by Lucy IRELAND; normal    HX CATARACT REMOVAL  12/28/2014    both eyes    HX COLONOSCOPY  10/22/2013    HX ENDOSCOPY      Followed by Dr. Steve Damon (multiple)    HX HEENT  1970's    septoplasty    HX ORTHOPAEDIC  2000    right rotator cuff repair    HX ORTHOPAEDIC      right knee total replacement    HX PROSTATECTOMY  2010    green light laser by Dr. Raffi Longo  5/7/13    CYSTOSCOPY WITH OPTICAL INTERNAL URETHROTOMY, AND CRYOABLATION OF PROSTATE     Current Outpatient Prescriptions   Medication Sig Dispense Refill    oxyCODONE-acetaminophen (PERCOCET 7.5) 7.5-325 mg per tablet Take 1 Tab by mouth every eight (8) hours as needed for Pain. Max Daily Amount: 3 Tabs.  30 Tab 0    traMADol (ULTRAM) 50 mg tablet TAKE TWO TABLETS BY MOUTH THREE TIMES A DAY WITH MEALS 540 Tab 1    lisinopril (PRINIVIL, ZESTRIL) 5 mg tablet TAKE ONE TABLET BY MOUTH DAILY 90 Tab 2    levocetirizine (XYZAL) 5 mg tablet TAKEK ONE TABLET BY MOUTH NIGHTLY 90 Tab 2    omeprazole (PRILOSEC) 20 mg capsule Take 1 Cap by mouth two (2) times a day. 180 Cap 3    Naphazoline-Gly-Benzalk Chl (CLEAR EYES REDNESS RELIEF) 0.012-0.2 % drop Apply 2 Drops to eye daily. Both eyes      alum-mag hydroxide-simeth (MYLANTA) 200-200-20 mg/5 mL susp Take 30 mL by mouth daily as needed.  acetaminophen (TYLENOL) 500 mg tablet Take 1,000 mg by mouth three (3) times daily. No Known Allergies  Family History   Problem Relation Age of Onset    Heart Disease Mother     Stroke Mother     Hypertension Mother     Arthritis-osteo Mother      neck and spine; back surgeries x5    Dementia Father      Alzheimer's type    Pneumonia Father      cause of death   [address] [facility] Arthritis-osteo Sister     Other Son      Biospy (unsure where)    No Known Problems Daughter      Social History   Substance Use Topics    Smoking status: Former Smoker     Packs/day: 2.00     Years: 10.00     Types: Cigarettes     Quit date: 2/1/1961    Smokeless tobacco: Never Used      Comment: When quit - over 3ppd    Alcohol use No      Comment: quit completely in 2008      Patient Active Problem List   Diagnosis Code    DJD (degenerative joint disease), multiple sites M15.9    GERD (gastroesophageal reflux disease) K21.9    History of peptic ulcer disease Z87.11    BPH (benign prostatic hypertrophy) N40.0    Perennial allergic rhinitis J30.89    Vitamin D deficiency E55.9    Chronic kidney disease, stage III (moderate) N18.3    Syncope R55    Prostate cancer ([facility] [address] C61    ACP (advance care planning) Z71.89       Depression Risk Factor Screening:     PHQ over the last two weeks 12/13/2017   Little interest or pleasure in doing things Not at all   Feeling down, depressed or hopeless Not at all   Total Score PHQ 2 0     Alcohol Risk Factor Screening: You do not drink alcohol or very rarely. Functional Ability and Level of Safety:   Hearing Loss  Hearing is good.     Activities of Daily Living  The home contains: no safety equipment. Patient does total self care    Fall RiskFall Risk Assessment, last 12 mths 12/13/2017   Able to walk? Yes   Fall in past 12 months? No       Abuse Screen  Patient is not abused  Lives with wife with dementia  Cognitive Screening   Evaluation of Cognitive Function:  Has your family/caregiver stated any concerns about your memory: no  Normal    Patient Care Team   Patient Care Team:  Adelina Bowles MD as PCP - General (Internal Medicine)  Brittni Duncan (Dental General Practice)  Kameron Velasco MD (Urology)  Zunilda Orellana MD (Orthopedic Surgery)  Christophe Mcclure (Dermatology)  Radha Crow MD (Gastroenterology)  Tea Funez MD (Optometry)     updated    Assessment/Plan   Education and counseling provided:  Are appropriate based on today's review and evaluation  End-of-Life planning (with patient's consent)  Influenza Vaccine  Prostate cancer screening tests (PSA, covered annually)  Colorectal cancer screening tests  Cardiovascular screening blood test  Screening for glaucoma  Diabetes screening test    Diagnoses and all orders for this visit:    1. Medicare annual wellness visit, subsequent      Health Maintenance Due   Topic Date Due    DTaP/Tdap/Td series (1 - Tdap) 06/17/1956    Pneumococcal 65+ High/Highest Risk (2 of 2 - PPSV23) 10/15/2015    GLAUCOMA SCREENING Q2Y  12/29/2016    Influenza Age 9 to Adult  08/01/2017     Discussed with patient about advance medical directive. Son Sharon Lee is his  sdm Provided patient blank AMD and Your Right to Decide Booklet. Requested that if completed to provide a copy of AMD to office.        Colonoscopy: 10/22/13, Dr. Alejandrina Erickson, repeat 10 years    AAA screen: 10/10, negative    Tdap: declines    Pneumovax 10/10    prevnar 13: 6/14/17  Zostavax: declines for now d/t lack of insurance coverage   Flu shot: 12/13/17     Eye: Dr. Michael Guzman, 1/17, due 1/18    Lipids: 6/17  annually  PSA: 3/16 due now  A1c:  12/16 5.7 due now        Medication reconciliation completed by MA and reviewed by me. Medical/surgical/social/family history reviewed and updated by me. Patient provided AVS and preventative screening table. Patient verbalized understanding of all information discussed.

## 2017-12-13 NOTE — MR AVS SNAPSHOT
Visit Information Date & Time Provider Department Dept. Phone Encounter #  
 12/13/2017  1:00 PM Yann Sanders, [address] [id number] Follow-up Instructions Return in about 6 months (around 6/13/2018). Upcoming Health Maintenance Date Due DTaP/Tdap/Td series (1 - Tdap) 6[birth year]17/1956 Pneumococcal 65+ High/Highest Risk (2 of 2 - PPSV23) 10/15/2015 GLAUCOMA SCREENING Q2Y 12/29/2016 Influenza Age 5 to Adult 8/1/2017 MEDICARE YEARLY EXAM 12/15/2017 Allergies as of 12/13/2017  Review Complete On: 12/13/2017 By: Grzegorz Felix LPN No Known Allergies Current Immunizations  Reviewed on 6/14/2017 Name Date Influenza High Dose Vaccine PF 10/19/2016 Influenza Vaccine 10/21/2014, 9/27/2013 Influenza Vaccine (Quad) PF 10/15/2015 Influenza Vaccine Split 10/25/2012, 10/21/2011 10:54 AM, 10/15/2010 ZZZ-RETIRED (DO NOT USE) Pneumococcal Vaccine (Unspecified Type) 10/15/2010 Not reviewed this visit You Were Diagnosed With   
  
 Codes Comments Chronic kidney disease, stage III (moderate)    -  Primary ICD-10-CM: N18.3 ICD-9-CM: 324. 3 Medicare annual wellness visit, subsequent     ICD-10-CM: Z00.00 ICD-9-CM: V70.0 Chronic idiopathic constipation     ICD-10-CM: K59.04 
ICD-9-CM: 564.00 Gastroesophageal reflux disease without esophagitis     ICD-10-CM: K21.9 ICD-9-CM: 530.81 Prostate cancer [facility])     ICD-10-CM: E15 ICD-9-CM: 867 Perennial allergic rhinitis     ICD-10-CM: J30.89 ICD-9-CM: 477.8 Essential hypertension     ICD-10-CM: I10 
ICD-9-CM: 401.9 Hyperthyroidism     ICD-10-CM: E05.90 ICD-9-CM: 242.90 Vitamin D deficiency     ICD-10-CM: E55.9 ICD-9-CM: 268.9 Dyslipidemia     ICD-10-CM: E78.5 ICD-9-CM: 272.4 Urinary frequency     ICD-10-CM: R35.0 ICD-9-CM: 788.41 Vitals BP Pulse Temp Resp Height(growth percentile) Weight(growth percentile) 134/73 (BP 1 Location: Left arm, BP Patient Position: Sitting) 72 97.8 °F (36.6 °C) (Oral) 16 5' 10\" (1.778 m) 170 lb (77.1 kg) SpO2 BMI Smoking Status 98% 24.39 kg/m2 Former Smoker Vitals History BMI and BSA Data Body Mass Index Body Surface Area  
 24.39 kg/m 2 1.95 m 2 Preferred Pharmacy Pharmacy Name Phone [facility] [address] [place], [address] [facility] Sarita Ackerman [phone] Your Updated Medication List  
  
   
This list is accurate as of: 12/13/17  1:30 PM.  Always use your most recent med list.  
  
  
  
  
 acetaminophen 500 mg tablet Commonly known as:  TYLENOL Take 1,000 mg by mouth three (3) times daily. alum-mag hydroxide-simeth 200-200-20 mg/5 mL Susp Commonly known as:  MYLANTA Take 30 mL by mouth daily as needed. CLEAR EYES REDNESS RELIEF 0.012-0.2 % Drop Generic drug:  Naphazoline-Gly-Benzalk Chl  
Apply 2 Drops to eye daily. Both eyes  
  
 lactulose 10 gram/15 mL solution Commonly known as:  Amna Kitten Take 5 mL by mouth three (3) times daily. levocetirizine 5 mg tablet Commonly known as:  Aj Dakins TAKEK ONE TABLET BY MOUTH NIGHTLY  
  
 lisinopril 5 mg tablet Commonly known as:  PRINIVIL, ZESTRIL  
TAKE ONE TABLET BY MOUTH DAILY  
  
 omeprazole 20 mg capsule Commonly known as:  PRILOSEC Take 1 Cap by mouth two (2) times a day. oxyCODONE-acetaminophen 7.5-325 mg per tablet Commonly known as:  PERCOCET 7.5 Take 1 Tab by mouth every eight (8) hours as needed for Pain. Max Daily Amount: 3 Tabs. traMADol 50 mg tablet Commonly known as:  ULTRAM  
TAKE TWO TABLETS BY MOUTH THREE TIMES A DAY WITH MEALS Prescriptions Printed Refills  
 traMADol (ULTRAM) 50 mg tablet 1 Sig: TAKE TWO TABLETS BY MOUTH THREE TIMES A DAY WITH MEALS Class: Print Prescriptions Sent to Pharmacy  Refills  
 omeprazole (PRILOSEC) 20 mg capsule 3  
 Sig: Take 1 Cap by mouth two (2) times a day. Class: Normal  
 Pharmacy: Laquita Murphy [address] [place] [address], [address] [facility] [address] [facility]  [address] Ph #: [phone] Route: Oral  
 levocetirizine (XYZAL) 5 mg tablet 2 Sig: TAKEK ONE TABLET BY MOUTH NIGHTLY Class: Normal  
 Pharmacy: Laquita Jeffrey[address], [address] Ph #: [phone]  
 lactulose (CHRONULAC) 10 gram/15 mL solution 1 Sig: Take 5 mL by mouth three (3) times daily. Class: Normal  
 Pharmacy: Laquita Newler [address], [address] [facility] [address] [facility]  [address] Ph #: [phone] Route: Oral  
  
We Performed the Following CBC W/O DIFF [87992 CPT(R)] LIPID PANEL [74986 CPT(R)] METABOLIC PANEL, COMPREHENSIVE [99396 CPT(R)] PSA, DIAGNOSTIC (PROSTATE SPECIFIC AG) [id number] CPT(R)] T4, FREE [id number] CPT(R)] TSH 3RD GENERATION [49881 CPT(R)] VITAMIN D, 25 HYDROXY [id number] CPT(R)] Follow-up Instructions Return in about 6 months (around 6/13/2018). Patient Instructions Medicare Part B Preventive Services  Limitations  Recommendation  Scheduled Bone Mass Measurement 
(age 72 & older, biennial)  Requires diagnosis related to osteoporosis or estrogen deficiency. Biennial benefit unless patient has history of long-term glucocorticoid tx or baseline is needed because initial test was by other method  N/A  N/A Cardiovascular Screening Blood Tests (every 5 years) Total cholesterol, HDL, Triglycerides  Order as a panel if possible  Completed 6/2017 
  
Recommended annually  Due 6/2018 Colorectal Cancer Screening 
-Fecal occult blood test (annual) -Flexible sigmoidoscopy (5y) 
-Screening colonoscopy (10y) -Barium Enema     Completed 10/2013 with Dr. Noé Bedoya 
  
Recommended in 10 years  Due 10/2023 Counseling to Prevent Tobacco Use (up to 8 sessions per year) - Counseling greater than 3 and up to 10 minutes - Counseling greater than 10 minutes  Patients must be asymptomatic of tobacco-related conditions to receive as preventive service  N/A  N/A Diabetes Screening Tests (at least every 3 years, Medicare covers annually or at 6-month intervals for prediabetic patients) 
   
Fasting blood sugar (FBS) or glucose tolerance test (GTT)  Patient must be diagnosed with one of the following: 
-Hypertension, Dyslipidemia, obesity, previous impaired FBS or GTT 
Or any two of the following: overweight, FH of diabetes, age ? 72, history of gestational diabetes, birth of baby weighing more than 9 pounds  Completed 6/2017 with GLU 94 
  
Recommended every 3 years for non-diabetics  Due 6/2018 
  
Typically checked annually Diabetes Self-Management Training (DSMT) (no USPSTF recommendation)  Requires referral by treating physician for patient with diabetes or renal disease. 10 hours of initial DSMT session of no less than 30 minutes each in a continuous 12-month period. 2 hours of follow-up DSMT in subsequent years. N/A  N/A Glaucoma Screening (no USPSTF recommendation)  Diabetes mellitus, family history, , age 48 or over,  American, age 72 or over  Completed 1/2017 with Dr. Lio Ramos 
  
Recommended annually  Due 1/2018 Human Immunodeficiency Virus (HIV) Screening (annually for increased risk patients) HIV-1 and HIV-2 by EIA, ALFREDO, rapid antibody test, or oral mucosa transudate  Patient must be at increased risk for HIV infection per USPSTF guidelines or pregnant. Tests covered annually for patients at increased risk. Pregnant patients may receive up to 3 test during pregnancy. N/A  N/A Medical Nutrition Therapy (MNT) (for diabetes or renal disease not recommended schedule)  Requires referral by treating physician for patient with diabetes or renal disease.  Can be provided in same year as diabetes self-management training (DSMT), and CMS recommends medical nutrition therapy take place after DSMT. Up to 3 hours for initial year and 2 hours in subsequent years. N/A  N/A Prostate Cancer Screening (annually up to age 76) - Digital rectal exam (ISAEL) - Prostate specific antigen (PSA)  Annually (age 48 or over), ISAEL not paid separately when covered E/M service is provided on same date  Completed 3/2016 with Dr. Mukesh Miramontes  Due as directed by Dr. Mukesh Miramontes Seasonal Influenza Vaccination (annually)     Completed 12/17 
  
Recommended annually  Due Fall 2018 Pneumococcal Vaccination (once after 65)     Pneumovax - 10/2010 
  
Prevnar 13 - 6/2017 
  
Both recommended once over the age of 72  Completed  
  
Completed Hepatitis B Vaccinations (if medium/high risk)  Medium/high risk factors: End-stage renal disease, Hemophiliacs who received Factor VIII or IX concentrates, Clients of institutions for the mentally retarded, Persons who live in the same house as a HepB virus carrier, Homosexual men, Illicit injectable drug abusers. N/A  N/A Screening Mammography (biennial age 54-69)? Annually (age 36 or over)  N/A N/A Screening Pap Tests and Pelvic Examination (up to age 79 and after 79 if unknown history or abnormal study last 10 years)  Every 24 months except high risk  N/A  N/A Ultrasound Screening for Abdominal Aortic Aneurysm (AAA) (once)  Patient must be referred through IPPE and not have had a screening for abdominal aortic aneurysm before under Medicare. Limited to patients who meet one of the following criteria: 
- Men who are 73-68 years old and have smoked more than 100 cigarettes in their lifetime. 
-Anyone with a FH of AAA 
-Anyone recommended for screening by USPSTF  Completed 10/2010 - negative for AAA  Completed  
  
  
Please bring in a copy of your advanced directive to your next office visit so we can have a copy on file. Medicare Wellness Visit, Male The best way to live healthy is to have a healthy lifestyle by eating a well-balanced diet, exercising regularly, limiting alcohol and stopping smoking. Regular physical exams and screening tests are another way to keep healthy. Preventive exams provided by your health care provider can find health problems before they become diseases or illnesses. Preventive services including immunizations, screening tests, monitoring and exams can help you take care of your own health. All people over age 72 should have a pneumovax  and and a prevnar shot to prevent pneumonia. These are once in a lifetime unless you and your provider decide differently. All people over 65 should have a yearly flu shot and a tetanus vaccine every 10 years. Screening for diabetes mellitus with a blood sugar test should be done every year. Glaucoma is a disease of the eye due to increased ocular pressure that can lead to blindness and it should be done every year by an eye professional. 
 
Cardiovascular screening tests that check for elevated lipids (fatty part of blood) which can lead to heart disease and strokes should be done every 5 years. Colorectal screening that evaluates for blood or polyps in your colon should be done yearly as a stool test or every five years as a flexible sigmoidoscope or every 10 years as a colonoscopy up to age 76. Men up to age 76 may need a screening blood test for prostate cancer at certain intervals, depending on their personal and family history. This decision is between the patient and his provider. If you have been a smoker or had family history of abdominal aortic aneurysms, you and your provider may decide to schedule an ultrasound test of your aorta. Hepatitis C screening is also recommended for anyone born between [birth year] through [address]. A shingles vaccine is also recommended once in a lifetime after age 61. Your Medicare Wellness Exam is recommended annually. Here is a list of your current Health Maintenance items with a due date: Health Maintenance Due Topic Date Due  
 DTaP/Tdap/Td  (1 - Tdap) 06/17/1956  Pneumococcal Vaccine (2 of 2 - PPSV23) 10/15/2015  Glaucoma Screening   12/29/2016  Flu Vaccine  08/01/2017 Introducing [facility]! [facility] introduces [employer] patient portal. Now you can access parts of your medical record, email your doctor's office, and request medication refills online. 1. In your internet browser, go to [email or web address] 2. Click on the First Time User? Click Here link in the Sign In box. You will see the New Member Sign Up page. 3. Enter your [employer] Access Code exactly as it appears below. You will not need to use this code after youve completed the sign-up process. If you do not sign up before the expiration date, you must request a new code. · [employer] Access Code: [email or web address] Expires: 3/13/2018 12:58 PM 
 
4. Enter the last four digits of your Social Security Number (xxxx) and Date of Birth (mm/dd/yyyy) as indicated and click Submit. You will be taken to the next sign-up page. 5. Create a [employer] ID. This will be your [employer] login ID and cannot be changed, so think of one that is secure and easy to remember. 6. Create a [employer] password. You can change your password at any time. 7. Enter your Password Reset Question and Answer. This can be used at a later time if you forget your password. 8. Enter your e-mail address. You will receive e-mail notification when new information is available in [address] 9. Click Sign Up. You can now view and download portions of your medical record. 10. Click the Download Summary menu link to download a portable copy of your medical information. If you have questions, please visit the Frequently Asked Questions section of the [employer] website. Remember, [employer] is NOT to be used for urgent needs. For medical emergencies, dial 911. Now available from your iPhone and Android! Please provide this summary of care documentation to your next provider. Your primary care clinician is listed as Sirisha Tatum. If you have any questions after today's visit, please call [phone].

## 2017-12-13 NOTE — PROGRESS NOTES
HISTORY OF PRESENT ILLNESS  Bessie Ledesma is a 80 y.o. male. HPI   Last here 6/14/17. Pt is here to f/u on chronic conditions. Pt presents with his wife.  Kathy Maria Esther  BP is 134/73  Pt has not been monitoring his BP at home   Continues on lisinopril 5mg daily to protect his kidneys    Wt is down 1 lb since last visit  Discussed diet and weight loss     Reviewed last labs 6/17: cr 1.7-1.8, thyroid running too fast (on first test), thyroid nl (on second test)  Ordered labs to complete prior to next visit   John Salmon on prilosec 20mg BID for reflux - works well  Pt sometimes only takes prilosec one tab daily - works well  Pt rarely gets breakthrough sx (only if he forgets to take this medication)  Pt tries to avoid trigger foods like ham and [place] food    Continues on xyzol daily for allergies - works well  Recall pt is allergic to fungus and pet dander    Pt c/o urinary frequency   Pt is not interested in trying medications for urinary frequency     Pt continues on tramadol with meals for his back pain (6 tabs daily)  He gets 540 tabs tramadol q3 months  Pt also has percocet to use prn for more severe back pain  Pt states that percocet has been constipating   Pt used miralax with no improvement to sx  Ordered lactulose   Discussed terms of prescribing  Discussed [facility] guidelines regarding narcotics distribution   Pain contract signed on 6/14/17  Urine drug screen ordered 12/13/17     Lov, I referred the pt to Dr. Zack Pruett (rheum)  However, he has not scheduled an appt with this rheum  Pt is now using emu oil for his hand arthritis from [facility]   Pt states that this oil has significantly improved his sx    Pt follows with Dr. Nemesio Vigil (Samm Encarnacion) annually for prostate cancer  Pt does not plan on f/u with this physician   Recall last note 3/30/16:  PSA undetectable, pt doing well no further UTI. Recall pt's prostate is removed    ACP not on file. SDM is his son Светлана Hager).     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 He continues to take tramadol for this 6 tabs per day. Gets 540 tabs for 3 months. --i prescribe this now, we addressed this last time and he stated he still gets pain and does not think the tramadol does anything but takes it for maintenance anyway       Recall he Has n dx of prostate cancer since January 2013 , sees calin annually and last saw him in January 2015--states he was released from care at that time.    Recall the patient has h/o ckd, last cr 1.5 continues to be stable. , prior was 1.77, uns 1.5-1.77  Recall that he Used to follow with rheumatologist dr Araceli Sanchez years ago --currently not seeing anyone. Recall the patient has hansens and a h/o stomach ulcer, sees ry for this.  Sx controlled with prilosec bid , he states he saw him last year or the year before, sx well controlled, only one or 2 breakthrough episodes.       PREVENTIVE:    Colonoscopy: 10/22/13, Dr. Leon Reyez, repeat 10 years  EGD: 4/16/16, Dr Leon Reyez, repeat 3 years  PSA: 1/13 dx'd with prostate cancer, 3/16, followed by Dr. Katerina Feliz  AAA screen: 10/10, negative  Tdap: declines    Pneumovax 10/10    prevnar 13: 6/14/17  Zostavax: declines for now d/t lack of insurance coverage   Flu shot: 12/13/17   Eye: Dr. Edwin Roa, 1/17, due 1/18  Lipids: 6/17   A1c: 6/16 5.9, 12/16 5.7  Pain contract: signed 6/14/17  Urine drug screen: ordered 12/13/17     Patient Active Problem List    Diagnosis Date Noted    ACP (advance care planning) 12/11/2015    Prostate cancer ([facility] [address] 02/18/2013    Syncope 08/21/2011    Chronic kidney disease, stage III (moderate) 06/28/2011    Vitamin D deficiency 06/27/2011    DJD (degenerative joint disease), multiple sites 03/09/2010    GERD (gastroesophageal reflux disease) 03/09/2010    History of peptic ulcer disease 03/09/2010    BPH (benign prostatic hypertrophy) 03/09/2010    Perennial allergic rhinitis 03/09/2010     Current Outpatient Prescriptions   Medication Sig Dispense Refill    oxyCODONE-acetaminophen (PERCOCET 7.5) 7.5-325 mg per tablet Take 1 Tab by mouth every eight (8) hours as needed for Pain. Max Daily Amount: 3 Tabs. 30 Tab 0    traMADol (ULTRAM) 50 mg tablet TAKE TWO TABLETS BY MOUTH THREE TIMES A DAY WITH MEALS 540 Tab 1    lisinopril (PRINIVIL, ZESTRIL) 5 mg tablet TAKE ONE TABLET BY MOUTH DAILY 90 Tab 2    levocetirizine (XYZAL) 5 mg tablet TAKEK ONE TABLET BY MOUTH NIGHTLY 90 Tab 2    omeprazole (PRILOSEC) 20 mg capsule Take 1 Cap by mouth two (2) times a day. 180 Cap 3    Naphazoline-Gly-Benzalk Chl (CLEAR EYES REDNESS RELIEF) 0.012-0.2 % drop Apply 2 Drops to eye daily. Both eyes      alum-mag hydroxide-simeth (MYLANTA) 200-200-20 mg/5 mL susp Take 30 mL by mouth daily as needed.  acetaminophen (TYLENOL) 500 mg tablet Take 1,000 mg by mouth three (3) times daily.        Past Surgical History:   Procedure Laterality Date    ENDOSCOPY, COLON, DIAGNOSTIC  2006    by Lucy IRELAND; normal    HX CATARACT REMOVAL  12/28/2014    both eyes    HX COLONOSCOPY  10/22/2013    HX ENDOSCOPY      Followed by Dr. Lorna Navas (multiple)    HX HEENT  1970's    septoplasty    HX ORTHOPAEDIC  2000    right rotator cuff repair    HX ORTHOPAEDIC      right knee total replacement    HX PROSTATECTOMY  2010    green light laser by Dr. Kalpesh Van HX UROLOGICAL  5/7/13    CYSTOSCOPY WITH OPTICAL INTERNAL URETHROTOMY, AND CRYOABLATION OF PROSTATE      Lab Results  Component Value Date/Time   WBC 7.6 06/15/2017 10:06 AM   HGB 13.5 06/15/2017 10:06 AM   HCT 40.7 06/15/2017 10:06 AM   PLATELET 923 17/21/9343 10:06 AM   MCV 96 06/15/2017 10:06 AM     Lab Results  Component Value Date/Time   Cholesterol, total 168 06/15/2017 10:06 AM   HDL Cholesterol 43 06/15/2017 10:06 AM   LDL, calculated 103 06/15/2017 10:06 AM Triglyceride 111 06/15/2017 10:06 AM   CHOL/HDL Ratio 3.6 06/25/2010 08:36 AM     Lab Results  Component Value Date/Time   GFR est non-AA 37 06/15/2017 10:06 AM   GFR est AA 42 06/15/2017 10:06 AM   Creatinine 1.71 06/15/2017 10:06 AM   BUN 39 06/15/2017 10:06 AM   Sodium 142 06/15/2017 10:06 AM   Potassium 4.9 06/15/2017 10:06 AM   Chloride 103 06/15/2017 10:06 AM   CO2 21 06/15/2017 10:06 AM          Review of Systems   HENT: Negative for hearing loss. Respiratory: Negative for shortness of breath. Cardiovascular: Negative for chest pain. Gastrointestinal: Positive for constipation. Genitourinary: Positive for frequency. Musculoskeletal: Negative for falls. Psychiatric/Behavioral: Negative for depression and memory loss. Physical Exam   Constitutional: He is oriented to person, place, and time. He appears well-developed and well-nourished. No distress. HENT:   Head: Normocephalic and atraumatic. Right Ear: External ear normal.   Left Ear: External ear normal.   Mouth/Throat: Oropharynx is clear and moist. No oropharyngeal exudate. Eyes: Conjunctivae and EOM are normal. Right eye exhibits no discharge. Left eye exhibits no discharge. Neck: Normal range of motion. Neck supple. No thyromegaly present. No carotid bruits    Cardiovascular: Normal rate, regular rhythm, normal heart sounds and intact distal pulses. Exam reveals no gallop and no friction rub. No murmur heard. Pulmonary/Chest: Effort normal and breath sounds normal. No respiratory distress. He has no wheezes. He has no rales. He exhibits no tenderness. Abdominal: Soft. He exhibits no distension. There is no tenderness. There is no rebound. Musculoskeletal: He exhibits no edema, tenderness or deformity. Lymphadenopathy:     He has no cervical adenopathy. Neurological: He is alert and oriented to person, place, and time. He has normal reflexes. He exhibits normal muscle tone.  Coordination abnormal.   Skin: Skin is warm and dry. No rash noted. He is not diaphoretic. No erythema. No pallor. Psychiatric: He has a normal mood and affect. His behavior is normal.       ASSESSMENT and PLAN    ICD-10-CM ICD-9-CM    1. Chronic kidney disease, stage III (moderate)    Cr runs around 1.7-1.8 baseline, pt does not follow with nephro as his renal function has been stable for years, I monitor, will repeat BMP today and ensure continued stability    V41.7 850.7 METABOLIC PANEL, COMPREHENSIVE      TSH 3RD GENERATION      T4, FREE      LIPID PANEL      VITAMIN D, 25 HYDROXY      CBC W/O DIFF   2. Medicare annual wellness visit, subsequent L19.51 F46.7 METABOLIC PANEL, COMPREHENSIVE      TSH 3RD GENERATION      T4, FREE      LIPID PANEL      VITAMIN D, 25 HYDROXY      CBC W/O DIFF   3. Chronic idiopathic constipation    Will give lactulose to use prn, otherwise can use miralax OTC   B02.21 042.84 METABOLIC PANEL, COMPREHENSIVE      TSH 3RD GENERATION      T4, FREE      LIPID PANEL      VITAMIN D, 25 HYDROXY      CBC W/O DIFF   4. Gastroesophageal reflux disease without esophagitis    Controled with prilosec BID, sometimes uses just once daily if sx are minimal    O28.2 289.60 METABOLIC PANEL, COMPREHENSIVE      TSH 3RD GENERATION      T4, FREE      LIPID PANEL      VITAMIN D, 25 HYDROXY      CBC W/O DIFF   5. Prostate cancer [facility])    Will check PSA, prev followed with Dr. Katerina Feliz, he is not planning on f/u with him   J00 202 METABOLIC PANEL, COMPREHENSIVE      TSH 3RD GENERATION      T4, FREE      LIPID PANEL      VITAMIN D, 25 HYDROXY      CBC W/O DIFF   6. Perennial allergic rhinitis    Controled with xyzal, will refill, pt is no longer following with his allergist   U37.11 868.9 METABOLIC PANEL, COMPREHENSIVE      TSH 3RD GENERATION      T4, FREE      LIPID PANEL      VITAMIN D, 25 HYDROXY      CBC W/O DIFF   7.  Essential hypertension    Controled on lisinopril which he takes primarily for kidney protection    T27 366.8 METABOLIC PANEL, COMPREHENSIVE      TSH 3RD GENERATION      T4, FREE      LIPID PANEL      VITAMIN D, 25 HYDROXY      CBC W/O DIFF   8. Hyperthyroidism    Will check TFTs today to ensure stable and to see if further tx is indicated, TSH was mildly suppressed, T4 was nl   X44.79 727.50 METABOLIC PANEL, COMPREHENSIVE      TSH 3RD GENERATION      T4, FREE      LIPID PANEL      VITAMIN D, 25 HYDROXY      CBC W/O DIFF   9. Vitamin D deficiency    Check level and treat prn   A04.1 478.3 METABOLIC PANEL, COMPREHENSIVE      TSH 3RD GENERATION      T4, FREE      LIPID PANEL      VITAMIN D, 25 HYDROXY      CBC W/O DIFF   10. Dyslipidemia    Check lipids fasting, diet-controled   T44.0 567.1 METABOLIC PANEL, COMPREHENSIVE      TSH 3RD GENERATION      T4, FREE      LIPID PANEL      VITAMIN D, 25 HYDROXY      CBC W/O DIFF   11. Urinary frequency    Longstanding problem, he is not interested in additional meds, no sx suggest UTI, will monitor    R35.0 788.41 PSA, DIAGNOSTIC (PROSTATE SPECIFIC AG)        Depression screen reviewed and negative. Scribed by Jaspal Stanford of [address] [place] [address] [address], as dictated by Dr. Tiffanie Pruett. Current diagnosis and concerns discussed with pt at length. Pt understands risks and benefits or current treatment plan and medications, and accepts the treatment and medication with any possible risks. Pt asks appropriate questions, which were answered. Pt was instructed to call with any concerns or problems. This note will not be viewable in [address]

## 2017-12-14 ENCOUNTER — APPOINTMENT (OUTPATIENT)
Dept: INTERNAL MEDICINE CLINIC | Age: 82
End: 2017-12-14

## 2017-12-14 DIAGNOSIS — Z79.891 ADMISSION FOR LONG-TERM OPIATE ANALGESIC USE: Primary | ICD-10-CM

## 2017-12-15 LAB
25(OH)D3+25(OH)D2 SERPL-MCNC: 29.8 NG/ML (ref 30–100)
ALBUMIN SERPL-MCNC: 4.1 G/DL (ref 3.5–4.7)
ALBUMIN/GLOB SERPL: 1.5 {RATIO} (ref 1.2–2.2)
ALP SERPL-CCNC: 90 IU/L (ref 39–117)
ALT SERPL-CCNC: 18 IU/L (ref 0–44)
AST SERPL-CCNC: 21 IU/L (ref 0–40)
BILIRUB SERPL-MCNC: 1 MG/DL (ref 0–1.2)
BUN SERPL-MCNC: 31 MG/DL (ref 8–27)
BUN/CREAT SERPL: 19 (ref 10–24)
CALCIUM SERPL-MCNC: 9.2 MG/DL (ref 8.6–10.2)
CHLORIDE SERPL-SCNC: 103 MMOL/L (ref 96–106)
CHOLEST SERPL-MCNC: 176 MG/DL (ref 100–199)
CO2 SERPL-SCNC: 23 MMOL/L (ref 18–29)
CREAT SERPL-MCNC: 1.66 MG/DL (ref 0.76–1.27)
ERYTHROCYTE [DISTWIDTH] IN BLOOD BY AUTOMATED COUNT: 13.9 % (ref 12.3–15.4)
GFR SERPLBLD CREATININE-BSD FMLA CKD-EPI: 38 ML/MIN/1.73
GFR SERPLBLD CREATININE-BSD FMLA CKD-EPI: 44 ML/MIN/1.73
GLOBULIN SER CALC-MCNC: 2.8 G/DL (ref 1.5–4.5)
GLUCOSE SERPL-MCNC: 92 MG/DL (ref 65–99)
HCT VFR BLD AUTO: 41.1 % (ref 37.5–51)
HDLC SERPL-MCNC: 44 MG/DL
HGB BLD-MCNC: 13.1 G/DL (ref 13–17.7)
LDLC SERPL CALC-MCNC: 103 MG/DL (ref 0–99)
MCH RBC QN AUTO: 31.8 PG (ref 26.6–33)
MCHC RBC AUTO-ENTMCNC: 31.9 G/DL (ref 31.5–35.7)
MCV RBC AUTO: 100 FL (ref 79–97)
PLATELET # BLD AUTO: 202 X10E3/UL (ref 150–379)
POTASSIUM SERPL-SCNC: 4.8 MMOL/L (ref 3.5–5.2)
PROT SERPL-MCNC: 6.9 G/DL (ref 6–8.5)
PSA SERPL-MCNC: 0.1 NG/ML (ref 0–4)
RBC # BLD AUTO: 4.12 X10E6/UL (ref 4.14–5.8)
SODIUM SERPL-SCNC: 144 MMOL/L (ref 134–144)
T4 FREE SERPL-MCNC: 1.19 NG/DL (ref 0.82–1.77)
TRIGL SERPL-MCNC: 145 MG/DL (ref 0–149)
TSH SERPL DL<=0.005 MIU/L-ACNC: 0.48 UIU/ML (ref 0.45–4.5)
VLDLC SERPL CALC-MCNC: 29 MG/DL (ref 5–40)
WBC # BLD AUTO: 6.9 X10E3/UL (ref 3.4–10.8)

## 2017-12-15 NOTE — PROGRESS NOTES
Labs great overall    Cr stable    psa came back 0.1, previously undetectable, has h/o prostate cancer, have him f/u with his urologist dr Nani Wetzel will likely repeat this level in a few months to rena,  and send copy labs to dr Adriana Bazzi

## 2017-12-15 NOTE — PROGRESS NOTES
Called, spoke to pt. Two pt identifiers confirmed. Pt informed per Dr. Chiara Helton labs great overall. Pt informed per Dr. Chiara Helton Cr stable. Pt informed per Dr. Chiara Helton psa came back 0.1, previously undetectable, has h/o prostate cancer, have him f/u with his urologist dr Goyo Ventura will likely repeat this level in a few months to eval. Labs faxed to Dr. Hieu Soto. Pt verbalized understanding of information discussed w/ no further questions at this time.

## 2017-12-20 LAB — DRUGS UR: NORMAL

## 2018-02-22 DIAGNOSIS — E78.5 DYSLIPIDEMIA: ICD-10-CM

## 2018-02-22 DIAGNOSIS — N18.30 CHRONIC KIDNEY DISEASE, STAGE III (MODERATE) (HCC): ICD-10-CM

## 2018-02-22 DIAGNOSIS — R73.01 IFG (IMPAIRED FASTING GLUCOSE): ICD-10-CM

## 2018-02-22 DIAGNOSIS — E55.9 VITAMIN D DEFICIENCY: ICD-10-CM

## 2018-02-22 RX ORDER — LISINOPRIL 5 MG/1
TABLET ORAL
Qty: 90 TAB | Refills: 1 | Status: SHIPPED | OUTPATIENT
Start: 2018-02-22 | End: 2018-08-19 | Stop reason: SDUPTHER

## 2018-06-13 ENCOUNTER — OFFICE VISIT (OUTPATIENT)
Dept: INTERNAL MEDICINE CLINIC | Age: 83
End: 2018-06-13

## 2018-06-13 VITALS
WEIGHT: 166.6 LBS | OXYGEN SATURATION: 99 % | HEIGHT: 70 IN | BODY MASS INDEX: 23.85 KG/M2 | TEMPERATURE: 97.6 F | HEART RATE: 72 BPM | RESPIRATION RATE: 18 BRPM | DIASTOLIC BLOOD PRESSURE: 72 MMHG | SYSTOLIC BLOOD PRESSURE: 122 MMHG

## 2018-06-13 DIAGNOSIS — M19.041 PRIMARY OSTEOARTHRITIS OF BOTH HANDS: ICD-10-CM

## 2018-06-13 DIAGNOSIS — E55.9 VITAMIN D DEFICIENCY: ICD-10-CM

## 2018-06-13 DIAGNOSIS — K21.9 GASTROESOPHAGEAL REFLUX DISEASE WITHOUT ESOPHAGITIS: ICD-10-CM

## 2018-06-13 DIAGNOSIS — K59.03 DRUG-INDUCED CONSTIPATION: ICD-10-CM

## 2018-06-13 DIAGNOSIS — C61 PROSTATE CANCER (HCC): ICD-10-CM

## 2018-06-13 DIAGNOSIS — N18.30 CHRONIC KIDNEY DISEASE, STAGE III (MODERATE) (HCC): Primary | ICD-10-CM

## 2018-06-13 DIAGNOSIS — M19.042 PRIMARY OSTEOARTHRITIS OF BOTH HANDS: ICD-10-CM

## 2018-06-13 DIAGNOSIS — E78.5 DYSLIPIDEMIA: ICD-10-CM

## 2018-06-13 NOTE — MR AVS SNAPSHOT
[address] [facility] 
[phone] Patient: Sabino Rodriguez MRN: [id number] [facility]:[birth year] Visit Information Date & Time Provider Department Dept. Phone Encounter #  
 6/13/2018  1:45 PM Aziza Granados, [address] [place] [address],[address] [phone] [id number] Follow-up Instructions Return in about 6 months (around 12/13/2018). Upcoming Health Maintenance Date Due DTaP/Tdap/Td series (1 - Tdap) 6/17/1956 Pneumococcal 65+ High/Highest Risk (2 of 2 - PPSV23) 10/15/2015 GLAUCOMA SCREENING Q2Y 12/29/2016 Influenza Age 5 to Adult 8/1/2018 Allergies as of 6/13/2018  Review Complete On: 12/13/2017 By: Aziza Granados MD  
 No Known Allergies Current Immunizations  Reviewed on 6/14/2017 Name Date Influenza High Dose Vaccine PF 12/13/2017, 10/19/2016 Influenza Vaccine 10/21/2014, 9/27/2013 Influenza Vaccine (Quad) PF 10/15/2015 Influenza Vaccine Split 10/25/2012, 10/21/2011 10:54 AM, 10/15/2010 ZZZ-RETIRED (DO NOT USE) Pneumococcal Vaccine (Unspecified Type) 10/15/2010 Not reviewed this visit You Were Diagnosed With   
  
 Codes Comments Chronic kidney disease, stage III (moderate)    -  Primary ICD-10-CM: N18.3 ICD-9-CM: 969. 3 Vitamin D deficiency     ICD-10-CM: E55.9 ICD-9-CM: 268.9 Prostate cancer [facility])     ICD-10-CM: A74 ICD-9-CM: 831 Primary osteoarthritis of both hands     ICD-10-CM: M19.041, S26.167 ICD-9-CM: 715.14 Gastroesophageal reflux disease without esophagitis     ICD-10-CM: K21.9 ICD-9-CM: 530.81 Drug-induced constipation     ICD-10-CM: K59.03 
ICD-9-CM: 564.09, E980.5 Dyslipidemia     ICD-10-CM: E78.5 ICD-9-CM: 272.4 Vitals Smoking Status Former Smoker Preferred Pharmacy Pharmacy Name Phone  [address], [address] [facility] [address] [facility] [phone] Your Updated Medication List  
  
   
This list is accurate as of 18  1:53 PM.  Always use your most recent med list.  
  
  
  
  
 acetaminophen 500 mg tablet Commonly known as:  TYLENOL Take 1,000 mg by mouth three (3) times daily. alum-mag hydroxide-simeth 200-200-20 mg/5 mL Susp Commonly known as:  MYLANTA Take 30 mL by mouth daily as needed. CLEAR EYES REDNESS RELIEF 0.012-0.2 % Drop Generic drug:  Naphazoline-Gly-Benzalk Chl  
Apply 2 Drops to eye daily. Both eyes  
  
 lactulose 10 gram/15 mL solution Commonly known as:  Reyes [place] Take 5 mL by mouth three (3) times daily. levocetirizine 5 mg tablet Commonly known as:  Maybelle Kussmaul TAKEK ONE TABLET BY MOUTH NIGHTLY  
  
 lisinopril 5 mg tablet Commonly known as:  PRINIVIL, ZESTRIL  
TAKE ONE TABLET BY MOUTH DAILY  
  
 omeprazole 20 mg capsule Commonly known as:  PRILOSEC Take 1 Cap by mouth two (2) times a day. oxyCODONE-acetaminophen 7.5-325 mg per tablet Commonly known as:  PERCOCET 7.5 Take 1 Tab by mouth every eight (8) hours as needed for Pain. Max Daily Amount: 3 Tabs. traMADol 50 mg tablet Commonly known as:  ULTRAM  
TAKE TWO TABLETS BY MOUTH THREE TIMES A DAY WITH MEALS  
  
 varicella-zoster recombinant (PF) 50 mcg/0.5 mL Susr injection Commonly known as:  SHINGRIX (PF)  
0.5 mL by IntraMUSCular route once for 1 dose. Prescriptions Printed Refills  
 varicella-zoster recombinant, PF, (SHINGRIX, PF,) 50 mcg/0.5 mL susr injection 1 Si.5 mL by IntraMUSCular route once for 1 dose. Class: Print Route: IntraMUSCular We Performed the Following METABOLIC PANEL, BASIC [28604 CPT(R)] PSA, DIAGNOSTIC (PROSTATE SPECIFIC AG) [id number] CPT(R)] Follow-up Instructions Return in about 6 months (around 2018). Introducing [facility]!    
 Luann Yu introduces [employer] patient portal. Now you can access parts of your medical record, email your doctor's office, and request medication refills online. 1. In your internet browser, go to [email or web address] 2. Click on the First Time User? Click Here link in the Sign In box. You will see the New Member Sign Up page. 3. Enter your [employer] Access Code exactly as it appears below. You will not need to use this code after youve completed the sign-up process. If you do not sign up before the expiration date, you must request a new code. · [employer] Access Code: [email or web address] Expires: 9/11/2018  1:52 PM 
 
4. Enter the last four digits of your Social Security Number (xxxx) and Date of Birth (mm/dd/yyyy) as indicated and click Submit. You will be taken to the next sign-up page. 5. Create a [employer] ID. This will be your [employer] login ID and cannot be changed, so think of one that is secure and easy to remember. 6. Create a [employer] password. You can change your password at any time. 7. Enter your Password Reset Question and Answer. This can be used at a later time if you forget your password. 8. Enter your e-mail address. You will receive e-mail notification when new information is available in [address] 9. Click Sign Up. You can now view and download portions of your medical record. 10. Click the Download Summary menu link to download a portable copy of your medical information. If you have questions, please visit the Frequently Asked Questions section of the [employer] website. Remember, [employer] is NOT to be used for urgent needs. For medical emergencies, dial 911. Now available from your iPhone and Android! Please provide this summary of care documentation to your next provider. Your primary care clinician is listed as Perla Walton. If you have any questions after today's visit, please call [phone].

## 2018-06-13 NOTE — PROGRESS NOTES
HISTORY OF PRESENT ILLNESS  Robi Mejía is a 80 y.o. male. HPI  Last here 12/13/17. Pt is here to f/u on chronic conditions. Pt presents with his wife.  Char Day  BP is controled   Pt has not been monitoring his BP at home   Continues on lisinopril 5mg daily to protect his kidneys     Wt is stable x lov   Discussed diet and w/l      Reviewed last labs 12/17: PSA 0.1, low vit D  Will get labs today     Advised him to take vit D OTC 1000U daily       Pt has been taking prilosec prn (infrequently), as his reflux has not been an issue     Continues on xyzol daily for allergies - works well  Recall pt is allergic to fungus and pet dander     Pt continues on tramadol with meals for his back pain (6 tabs daily)  Pt gets 540 tabs tramadol q3 months  Pt also has percocet to use prn (rarely - last used in >1 year) for more severe back pain  Lov, he c/o constipation from tramadol  Lov, I ordered lactulose with no improvement to sx  Pt is taking miralax with improvement to sx   Discussed terms of prescribing  Discussed VA guidelines regarding narcotics distribution   Pain contract signed on 6/14/17  Urine drug screen ordered 06/13/18      Since lov, I referred the pt to Dr. Kenneth Plummer (rheum)  However, he has not scheduled an appt with this rheum  He is not interested in scheduling an appt with this rheum   Pt still c/o pain from hand arthritis   Pt uses emu oil for his hand arthritis from [facility]   Pt states that this oil has significantly improved his sx     Pt follows with Dr. George Smith ([facility]) annually for prostate cancer  Advised him to schedule an appt with this uro  Recall last note 3/30/16:  PSA undetectable, pt doing well no further UTI. Recall pt's prostate is removed    Pt has not had a zostavax vaccine    Discussed shingrix being a dead vaccine   Discussed it being more effective than zostavax (92% effective)  Discussed it being a 2 part series  Ordered 06/13/18       ACP not on file.  SDM is his son Raymond Villalba Alina Villarreal).     Recall the patient has OA in his hands which bothers him the most, also some in the neck. He continues to take tramadol for this 6 tabs per day. Gets 540 tabs for 3 months. --i prescribe this now, we addressed this last time and he stated he still gets pain and does not think the tramadol does anything but takes it for maintenance anyway       Recall he has a dx of prostate cancer since January 2013 , sees calin annually and last saw him in January 2015--states he was released from care at that time.    Recall the patient has h/o ckd, last cr 1.5 continues to be stable. , prior was 1.77, uns 1.5-1.77  Recall that he Used to follow with rheumatologist dr Navin Chávez years ago --currently not seeing anyone. Recall the patient has hansens and a h/o stomach ulcer, sees ry for this.  Sx controlled with prilosec bid , he states he saw him last year or the year before, sx well controlled, only one or 2 breakthrough episodes.        PREVENTIVE:    Colonoscopy: 10/22/13, Dr. Jose C Bejarano, repeat 10 years  EGD: 4/16/16, Dr Jose C Bejarano, repeat 3 years  PSA: 1/13 dx'd with prostate cancer, 3/16, 12/17 0.1, followed by Dr. Tori Burgos  AAA screen: 10/10, negative  Tdap: declines    Pneumovax 10/10    prevnar 13: 6/14/17  Shingrix: ordered 06/13/18   Flu shot: 12/13/17   Eye: Dr. Darion De La Torre, 1/18, will get notes for review  Lipids: 12/17   A1c: 6/16 5.9, 12/16 5.7  Pain contract: signed 6/14/17  Urine drug screen: 12/13/17, c/w tramadol, ordered 06/13/18     Patient Active Problem List    Diagnosis Date Noted    ACP (advance care planning) 12/11/2015    Prostate cancer ([facility] [address] 02/18/2013    Syncope 08/21/2011    Chronic kidney disease, stage III (moderate) 06/28/2011    Vitamin D deficiency 06/27/2011    DJD (degenerative joint disease), multiple sites 03/09/2010    GERD (gastroesophageal reflux disease) 03/09/2010    History of peptic ulcer disease 03/09/2010    BPH (benign prostatic hypertrophy) 03/09/2010    Perennial allergic rhinitis 03/09/2010     Current Outpatient Prescriptions   Medication Sig Dispense Refill    lisinopril (PRINIVIL, ZESTRIL) 5 mg tablet TAKE ONE TABLET BY MOUTH DAILY 90 Tab 1    omeprazole (PRILOSEC) 20 mg capsule Take 1 Cap by mouth two (2) times a day. 180 Cap 3    traMADol (ULTRAM) 50 mg tablet TAKE TWO TABLETS BY MOUTH THREE TIMES A DAY WITH MEALS 540 Tab 1    levocetirizine (XYZAL) 5 mg tablet TAKEK ONE TABLET BY MOUTH NIGHTLY 90 Tab 2    lactulose (CHRONULAC) 10 gram/15 mL solution Take 5 mL by mouth three (3) times daily. 480 mL 1    oxyCODONE-acetaminophen (PERCOCET 7.5) 7.5-325 mg per tablet Take 1 Tab by mouth every eight (8) hours as needed for Pain. Max Daily Amount: 3 Tabs. 30 Tab 0    Naphazoline-Gly-Benzalk Chl (CLEAR EYES REDNESS RELIEF) 0.012-0.2 % drop Apply 2 Drops to eye daily. Both eyes      alum-mag hydroxide-simeth (MYLANTA) 200-200-20 mg/5 mL susp Take 30 mL by mouth daily as needed.  acetaminophen (TYLENOL) 500 mg tablet Take 1,000 mg by mouth three (3) times daily.        Past Surgical History:   Procedure Laterality Date    ENDOSCOPY, COLON, DIAGNOSTIC  2006    by Luyc IRELAND; normal    HX CATARACT REMOVAL  12/28/2014    both eyes    HX COLONOSCOPY  10/22/2013    HX ENDOSCOPY      Followed by Dr. Jose C Bejarano (multiple)    HX HEENT  1970's    septoplasty    HX ORTHOPAEDIC  2000    right rotator cuff repair    HX ORTHOPAEDIC      right knee total replacement    HX PROSTATECTOMY  2010    green light laser by Dr. Murleen Nissen HX UROLOGICAL  5/7/13    CYSTOSCOPY WITH OPTICAL INTERNAL URETHROTOMY, AND CRYOABLATION OF PROSTATE      Lab Results  Component Value Date/Time   WBC 6.9 12/14/2017 09:58 AM   HGB 13.1 12/14/2017 09:58 AM   HCT 41.1 12/14/2017 09:58 AM   PLATELET 932 36/98/3802 09:58 AM    (H) 12/14/2017 09:58 AM     Lab Results  Component Value Date/Time   Cholesterol, total 176 12/14/2017 09:58 AM   HDL Cholesterol 44 12/14/2017 09:58 AM   LDL, calculated 103 (H) 12/14/2017 09:58 AM   Triglyceride 145 12/14/2017 09:58 AM   CHOL/HDL Ratio 3.6 06/25/2010 08:36 AM     Lab Results  Component Value Date/Time   GFR est non-AA 38 (L) 12/14/2017 09:58 AM   GFR est AA 44 (L) 12/14/2017 09:58 AM   Creatinine 1.66 (H) 12/14/2017 09:58 AM   BUN 31 (H) 12/14/2017 09:58 AM   Sodium 144 12/14/2017 09:58 AM   Potassium 4.8 12/14/2017 09:58 AM   Chloride 103 12/14/2017 09:58 AM   CO2 23 12/14/2017 09:58 AM        Review of Systems   Respiratory: Negative for shortness of breath. Cardiovascular: Negative for chest pain. Gastrointestinal: Negative for constipation. Musculoskeletal: Positive for joint pain. Negative for falls. Psychiatric/Behavioral: Negative for depression. Physical Exam   Constitutional: He is oriented to person, place, and time. He appears well-developed and well-nourished. No distress. HENT:   Head: Normocephalic and atraumatic. Mouth/Throat: Oropharynx is clear and moist. No oropharyngeal exudate. Eyes: Conjunctivae and EOM are normal. Right eye exhibits no discharge. Left eye exhibits no discharge. Neck: Normal range of motion. Neck supple. No thyromegaly present. Cardiovascular: Normal rate, regular rhythm and normal heart sounds. Exam reveals no gallop and no friction rub. No murmur heard. Pulmonary/Chest: Effort normal and breath sounds normal. No respiratory distress. He has no wheezes. He has no rales. He exhibits no tenderness. Musculoskeletal: He exhibits no edema, tenderness or deformity. Lymphadenopathy:     He has no cervical adenopathy. Neurological: He is alert and oriented to person, place, and time. He displays abnormal reflex. He exhibits normal muscle tone. Coordination normal.   Skin: Skin is warm and dry. No rash noted. He is not diaphoretic. No erythema. No pallor. 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  His behavior is normal.       ASSESSMENT and PLAN ICD-10-CM ICD-9-CM    1. Chronic kidney disease, stage III (moderate)    Cr runs ~1.6-1.8 baseline, stable on last set of labs, will repeat today   D44.7 449.8 METABOLIC PANEL, BASIC   2. Vitamin D deficiency    Advised that he start taking OTC vit D 1000U/day   E55.9 268.9    3. Prostate cancer (HCC)    PSA was 0.1 on recent labs, prev undetectable, will repeat PSA today to ensure this is not a progressive climb, I had asked him to f/u with Dr. Hebert Kolb at [facility] but he did not, discussed again today, if PSA remains detectable he needs to f/u with uro, he states he will   C61 185 PSA, DIAGNOSTIC (PROSTATE SPECIFIC AG)   4. Primary osteoarthritis of both hands    Controled with tramadol 6/day, not needing percocet recently,  reviewed, pain contract signed, urine drug screen obtained, only gets if from me, will continue to prescribe, no signs of abuse, will only go to rheum again for progressive sx   M19.041 715.14     M19.042     5. Gastroesophageal reflux disease without esophagitis    Controled on prilosec, improving overall, has not needed to use it on a daily basis    K21.9 530.81    6. Drug-induced constipation    Miralax prn, not using lactulose    K59.03 564.09      E980.5    7. Dyslipidemia    Diet-controled    E78.5 272.4         Scribed by Liliana Sharma of [facility], as dictated by Dr. Shanelle Le. Current diagnosis and concerns discussed with pt at length. Pt understands risks and benefits or current treatment plan and medications, and accepts the treatment and medication with any possible risks. Pt asks appropriate questions, which were answered. Pt was instructed to call with any concerns or problems. This note will not be viewable in [address]

## 2018-06-14 LAB
BUN SERPL-MCNC: 33 MG/DL (ref 8–27)
BUN/CREAT SERPL: 17 (ref 10–24)
CALCIUM SERPL-MCNC: 9.2 MG/DL (ref 8.6–10.2)
CHLORIDE SERPL-SCNC: 105 MMOL/L (ref 96–106)
CO2 SERPL-SCNC: 19 MMOL/L (ref 20–29)
CREAT SERPL-MCNC: 1.9 MG/DL (ref 0.76–1.27)
GFR SERPLBLD CREATININE-BSD FMLA CKD-EPI: 32 ML/MIN/1.73
GFR SERPLBLD CREATININE-BSD FMLA CKD-EPI: 37 ML/MIN/1.73
GLUCOSE SERPL-MCNC: 77 MG/DL (ref 65–99)
POTASSIUM SERPL-SCNC: 5.2 MMOL/L (ref 3.5–5.2)
PSA SERPL-MCNC: 0.1 NG/ML (ref 0–4)
SODIUM SERPL-SCNC: 142 MMOL/L (ref 134–144)

## 2018-06-14 NOTE — PROGRESS NOTES
Called, spoke to pt. Two pt identifiers confirmed. Pt informed per Dr. Farideh Laird mild decrease in renal function from baseline. Pt informed per Dr. Farideh Laird repeat bmp in 2 weeks to ensure stable. Labs ordered and mailed to pt. Pt verbalized understanding of information discussed w/ no further questions at this time.

## 2018-06-19 LAB — DRUGS UR: NORMAL

## 2018-06-28 ENCOUNTER — APPOINTMENT (OUTPATIENT)
Dept: INTERNAL MEDICINE CLINIC | Age: 83
End: 2018-06-28

## 2018-06-28 DIAGNOSIS — N18.30 CHRONIC KIDNEY DISEASE, STAGE III (MODERATE) (HCC): ICD-10-CM

## 2018-06-29 LAB
BUN SERPL-MCNC: 30 MG/DL (ref 8–27)
BUN/CREAT SERPL: 19 (ref 10–24)
CALCIUM SERPL-MCNC: 9.2 MG/DL (ref 8.6–10.2)
CHLORIDE SERPL-SCNC: 106 MMOL/L (ref 96–106)
CO2 SERPL-SCNC: 23 MMOL/L (ref 20–29)
CREAT SERPL-MCNC: 1.62 MG/DL (ref 0.76–1.27)
GLUCOSE SERPL-MCNC: 87 MG/DL (ref 65–99)
POTASSIUM SERPL-SCNC: 4.9 MMOL/L (ref 3.5–5.2)
SODIUM SERPL-SCNC: 143 MMOL/L (ref 134–144)

## 2018-08-19 DIAGNOSIS — E55.9 VITAMIN D DEFICIENCY: ICD-10-CM

## 2018-08-19 DIAGNOSIS — R73.01 IFG (IMPAIRED FASTING GLUCOSE): ICD-10-CM

## 2018-08-19 DIAGNOSIS — N18.30 CHRONIC KIDNEY DISEASE, STAGE III (MODERATE) (HCC): ICD-10-CM

## 2018-08-19 DIAGNOSIS — E78.5 DYSLIPIDEMIA: ICD-10-CM

## 2018-08-19 RX ORDER — LISINOPRIL 5 MG/1
TABLET ORAL
Qty: 90 TAB | Refills: 0 | Status: SHIPPED | OUTPATIENT
Start: 2018-08-19 | End: 2018-11-14 | Stop reason: SDUPTHER

## 2018-08-31 DIAGNOSIS — G89.4 CHRONIC PAIN SYNDROME: Primary | ICD-10-CM

## 2018-09-01 RX ORDER — TRAMADOL HYDROCHLORIDE 50 MG/1
TABLET ORAL
Qty: 540 TAB | Refills: 0 | Status: SHIPPED | OUTPATIENT
Start: 2018-09-01 | End: 2018-12-18 | Stop reason: SDUPTHER

## 2018-09-01 RX ORDER — LEVOCETIRIZINE DIHYDROCHLORIDE 5 MG/1
TABLET, FILM COATED ORAL
Qty: 90 TAB | Refills: 1 | Status: SHIPPED | OUTPATIENT
Start: 2018-09-01 | End: 2019-02-18 | Stop reason: SDUPTHER

## 2018-09-04 DIAGNOSIS — G89.4 CHRONIC PAIN SYNDROME: ICD-10-CM

## 2018-09-05 RX ORDER — TRAMADOL HYDROCHLORIDE 50 MG/1
TABLET ORAL
Qty: 540 TAB | Refills: 0 | OUTPATIENT
Start: 2018-09-05

## 2018-09-05 NOTE — TELEPHONE ENCOUNTER
PCP: Claudetta Dikes, MD    Last appt: 6/28/2018  Future Appointments  Date Time Provider Sam Carter   12/14/2018 1:15 PM Claudetta Dikes, MD [facility]       Requested Prescriptions     Pending Prescriptions Disp Refills    traMADol (ULTRAM) 50 mg tablet 540 Tab 0

## 2018-11-06 ENCOUNTER — CLINICAL SUPPORT (OUTPATIENT)
Dept: INTERNAL MEDICINE CLINIC | Age: 83
End: 2018-11-06

## 2018-11-06 VITALS
SYSTOLIC BLOOD PRESSURE: 115 MMHG | WEIGHT: 164 LBS | HEIGHT: 70 IN | DIASTOLIC BLOOD PRESSURE: 67 MMHG | OXYGEN SATURATION: 95 % | HEART RATE: 77 BPM | RESPIRATION RATE: 16 BRPM | BODY MASS INDEX: 23.48 KG/M2 | TEMPERATURE: 97.9 F

## 2018-11-06 DIAGNOSIS — Z23 ENCOUNTER FOR IMMUNIZATION: Primary | ICD-10-CM

## 2018-11-14 DIAGNOSIS — N18.30 CHRONIC KIDNEY DISEASE, STAGE III (MODERATE) (HCC): ICD-10-CM

## 2018-11-14 DIAGNOSIS — R73.01 IFG (IMPAIRED FASTING GLUCOSE): ICD-10-CM

## 2018-11-14 DIAGNOSIS — E78.5 DYSLIPIDEMIA: ICD-10-CM

## 2018-11-14 DIAGNOSIS — E55.9 VITAMIN D DEFICIENCY: ICD-10-CM

## 2018-11-14 RX ORDER — LISINOPRIL 5 MG/1
TABLET ORAL
Qty: 90 TAB | Refills: 0 | Status: SHIPPED | OUTPATIENT
Start: 2018-11-14 | End: 2019-02-18 | Stop reason: SDUPTHER

## 2018-12-17 ENCOUNTER — DOCUMENTATION ONLY (OUTPATIENT)
Dept: INTERNAL MEDICINE CLINIC | Age: 83
End: 2018-12-17

## 2018-12-17 NOTE — PROGRESS NOTES
Medicare Part B Preventive Services  Limitations  Recommendation  Scheduled    Bone Mass Measurement  (age 72 & older, biennial)  Requires diagnosis related to osteoporosis or estrogen deficiency. Biennial benefit unless patient has history of long-term glucocorticoid tx or baseline is needed because initial test was by other method  N/A  N/A    Cardiovascular Screening Blood Tests (every 5 years)  Total cholesterol, HDL, Triglycerides  Order as a panel if possible  Completed 12/2017     Recommended annually  Due now   Colorectal Cancer Screening  -Fecal occult blood test (annual)  -Flexible sigmoidoscopy (5y)  -Screening colonoscopy (10y)  -Barium Enema     Completed 10/2013 with Dr. Gretchen Melton     Recommended in 10 years  Due 10/2023    Counseling to Prevent Tobacco Use (up to 8 sessions per year)  - Counseling greater than 3 and up to 10 minutes  - Counseling greater than 10 minutes  Patients must be asymptomatic of tobacco-related conditions to receive as preventive service  N/A  N/A    Diabetes Screening Tests (at least every 3 years, Medicare covers annually or at 6-month intervals for prediabetic patients)      Fasting blood sugar (FBS) or glucose tolerance test (GTT)  Patient must be diagnosed with one of the following:  -Hypertension, Dyslipidemia, obesity, previous impaired FBS or GTT  Or any two of the following: overweight, FH of diabetes, age ? 72, history of gestational diabetes, birth of baby weighing more than 9 pounds  Completed 6/2018 with [facility]     Recommended every 3 years for non-diabetics, typically checked annually Due 6/2019      Diabetes Self-Management Training (DSMT) (no USPSTF recommendation)  Requires referral by treating physician for patient with diabetes or renal disease. 10 hours of initial DSMT session of no less than 30 minutes each in a continuous 12-month period. 2 hours of follow-up DSMT in subsequent years.   N/A  N/A    Glaucoma Screening (no USPSTF recommendation)  Diabetes mellitus, family history, , age 48 or over,  American, age 72 or over  Completed 1/2018     Recommended annually  Due 1/2019   Human Immunodeficiency Virus (HIV) Screening (annually for increased risk patients)  HIV-1 and HIV-2 by EIA, ALFREDO, rapid antibody test, or oral mucosa transudate  Patient must be at increased risk for HIV infection per USPSTF guidelines or pregnant. Tests covered annually for patients at increased risk. Pregnant patients may receive up to 3 test during pregnancy. N/A  N/A    Medical Nutrition Therapy (MNT) (for diabetes or renal disease not recommended schedule)  Requires referral by treating physician for patient with diabetes or renal disease. Can be provided in same year as diabetes self-management training (DSMT), and CMS recommends medical nutrition therapy take place after DSMT. Up to 3 hours for initial year and 2 hours in subsequent years. N/A  N/A    Prostate Cancer Screening (annually up to age 76)  - Digital rectal exam (ISAEL)  - Prostate specific antigen (PSA)  Annually (age 48 or over), ISAEL not paid separately when covered E/M service is provided on same date  Completed 6/2018 Due 6/2019 or as directed    Seasonal Influenza Vaccination (annually)     Completed Fall 2018     Recommended annually  Due Fall 2019   Pneumococcal Vaccination (once after 72)     Pneumovax - 10/2010     Prevnar 13 - 6/2017     Both recommended once over the age of 72  Completed        Completed    Hepatitis B Vaccinations (if medium/high risk)  Medium/high risk factors: End-stage renal disease,  Hemophiliacs who received Factor VIII or IX concentrates, Clients of institutions for the mentally retarded, Persons who live in the same house as a HepB virus carrier, Homosexual men, Illicit injectable drug abusers.   N/A  N/A                Ultrasound Screening for Abdominal Aortic Aneurysm (AAA) (once)  Patient must be referred through IPPE and not have had a screening for abdominal aortic aneurysm before under Medicare.  Limited to patients who meet one of the following criteria:  - Men who are 73-68 years old and have smoked more than 100 cigarettes in their lifetime.  -Anyone with a FH of AAA  -Anyone recommended for screening by USPSTF  Completed 10/2010 - negative for AAA  Completed

## 2018-12-18 ENCOUNTER — OFFICE VISIT (OUTPATIENT)
Dept: INTERNAL MEDICINE CLINIC | Age: 83
End: 2018-12-18

## 2018-12-18 VITALS
HEIGHT: 70 IN | SYSTOLIC BLOOD PRESSURE: 121 MMHG | WEIGHT: 168 LBS | BODY MASS INDEX: 24.05 KG/M2 | RESPIRATION RATE: 16 BRPM | TEMPERATURE: 98.1 F | DIASTOLIC BLOOD PRESSURE: 70 MMHG | OXYGEN SATURATION: 97 % | HEART RATE: 69 BPM

## 2018-12-18 DIAGNOSIS — M19.042 PRIMARY OSTEOARTHRITIS OF BOTH HANDS: ICD-10-CM

## 2018-12-18 DIAGNOSIS — K21.9 GASTROESOPHAGEAL REFLUX DISEASE WITHOUT ESOPHAGITIS: ICD-10-CM

## 2018-12-18 DIAGNOSIS — C61 PROSTATE CANCER (HCC): ICD-10-CM

## 2018-12-18 DIAGNOSIS — E78.5 DYSLIPIDEMIA: ICD-10-CM

## 2018-12-18 DIAGNOSIS — M19.041 PRIMARY OSTEOARTHRITIS OF BOTH HANDS: ICD-10-CM

## 2018-12-18 DIAGNOSIS — G89.4 CHRONIC PAIN SYNDROME: ICD-10-CM

## 2018-12-18 DIAGNOSIS — N18.30 CHRONIC KIDNEY DISEASE, STAGE III (MODERATE) (HCC): ICD-10-CM

## 2018-12-18 DIAGNOSIS — J30.89 PERENNIAL ALLERGIC RHINITIS: ICD-10-CM

## 2018-12-18 DIAGNOSIS — Z00.00 MEDICARE ANNUAL WELLNESS VISIT, SUBSEQUENT: Primary | ICD-10-CM

## 2018-12-18 DIAGNOSIS — K59.04 CHRONIC IDIOPATHIC CONSTIPATION: ICD-10-CM

## 2018-12-18 RX ORDER — LACTULOSE 10 G/15ML
1 SOLUTION ORAL; RECTAL 3 TIMES DAILY
Qty: 480 ML | Refills: 1 | Status: SHIPPED | OUTPATIENT
Start: 2018-12-18 | End: 2019-04-08

## 2018-12-18 RX ORDER — TRAMADOL HYDROCHLORIDE 50 MG/1
50 TABLET ORAL
Qty: 540 TAB | Refills: 0 | Status: SHIPPED | OUTPATIENT
Start: 2018-12-18 | End: 2019-05-16 | Stop reason: SDUPTHER

## 2018-12-18 NOTE — PATIENT INSTRUCTIONS
Medicare Wellness Visit, Male    The best way to live healthy is to have a lifestyle where you eat a well-balanced diet, exercise regularly, limit alcohol use, and quit all forms of tobacco/nicotine, if applicable. Regular preventive services are another way to keep healthy. Preventive services (vaccines, screening tests, monitoring & exams) can help personalize your care plan, which helps you manage your own care. Screening tests can find health problems at the earliest stages, when they are easiest to treat. [facility] follows the current, evidence-based guidelines published by the [facility] (RUSTSTF) when recommending preventive services for our patients. Because we follow these guidelines, sometimes recommendations change over time as research supports it. (For example, a prostate screening blood test is no longer routinely recommended for men with no symptoms.)  Of course, you and your doctor may decide to screen more often for some diseases, based on your risk and co-morbidities (chronic disease you are already diagnosed with). Preventive services for you include:  - Medicare offers their members a free annual wellness visit, which is time for you and your primary care provider to discuss and plan for your preventive service needs. Take advantage of this benefit every year!  -All adults over age 72 should receive the recommended pneumonia vaccines. Current USPSTF guidelines recommend a series of two vaccines for the best pneumonia protection.   -All adults should have a flu vaccine yearly and an ECG.  All adults age 61 and older should receive a shingles vaccine once in their lifetime.    -All adults age 38-68 who are overweight should have a diabetes screening test once every three years.   -Other screening tests & preventive services for persons with diabetes include: an eye exam to screen for diabetic retinopathy, a kidney function test, a foot exam, and stricter control over your cholesterol.   -Cardiovascular screening for adults with routine risk involves an electrocardiogram (ECG) at intervals determined by the provider.   -Colorectal cancer screening should be done for adults age 54-65 with no increased risk factors for colorectal cancer. There are a number of acceptable methods of screening for this type of cancer. Each test has its own benefits and drawbacks. Discuss with your provider what is most appropriate for you during your annual wellness visit. The different tests include: colonoscopy (considered the best screening method), a fecal occult blood test, a fecal DNA test, and sigmoidoscopy.  -All adults born between [facility] should be screened once for Hepatitis C.  -An Abdominal Aortic Aneurysm (AAA) Screening is recommended for men age 73-68 who has ever smoked in their lifetime. Here is a list of your current Health Maintenance items (your personalized list of preventive services) with a due date:  Health Maintenance Due   Topic Date Due    DTaP/Tdap/Td  (1 - Tdap) 06/17/1956    Shingles Vaccine (1 of 2) 06/17/1985    Glaucoma Screening   12/29/2016    Annual Well Visit  12/14/2018     Medicare Part B Preventive Services  Limitations  Recommendation  Scheduled    Bone Mass Measurement  (age 72 & older, biennial)  Requires diagnosis related to osteoporosis or estrogen deficiency.  Biennial benefit unless patient has history of long-term glucocorticoid tx or baseline is needed because initial test was by other method  N/A  N/A    Cardiovascular Screening Blood Tests (every 5 years)  Total cholesterol, HDL, Triglycerides  Order as a panel if possible  Completed 12/2017     Recommended annually  Due now   Colorectal Cancer Screening  -Fecal occult blood test (annual)  -Flexible sigmoidoscopy (5y)  -Screening colonoscopy (10y)  -Barium Enema     Completed 10/2013 with Dr. Christina Lomas     Recommended in 10 years  Due 10/2023    Counseling to Prevent Tobacco Use (up to 8 sessions per year)  - Counseling greater than 3 and up to 10 minutes  - Counseling greater than 10 minutes  Patients must be asymptomatic of tobacco-related conditions to receive as preventive service  N/A  N/A    Diabetes Screening Tests (at least every 3 years, Medicare covers annually or at 6-month intervals for prediabetic patients)      Fasting blood sugar (FBS) or glucose tolerance test (GTT)  Patient must be diagnosed with one of the following:  -Hypertension, Dyslipidemia, obesity, previous impaired FBS or GTT  Or any two of the following: overweight, FH of diabetes, age ? 72, history of gestational diabetes, birth of baby weighing more than 9 pounds  Completed 6/2018 with [facility]     Recommended every 3 years for non-diabetics, typically checked annually Due 6/2019      Diabetes Self-Management Training (DSMT) (no USPSTF recommendation)  Requires referral by treating physician for patient with diabetes or renal disease. 10 hours of initial DSMT session of no less than 30 minutes each in a continuous 12-month period. 2 hours of follow-up DSMT in subsequent years. N/A  N/A    Glaucoma Screening (no USPSTF recommendation)  Diabetes mellitus, family history, , age 48 or over,  American, age 72 or over  Completed 1/2018     Recommended annually  Due 1/2019   Human Immunodeficiency Virus (HIV) Screening (annually for increased risk patients)  HIV-1 and HIV-2 by EIA, ALFREDO, rapid antibody test, or oral mucosa transudate  Patient must be at increased risk for HIV infection per USPSTF guidelines or pregnant. Tests covered annually for patients at increased risk. Pregnant patients may receive up to 3 test during pregnancy. N/A  N/A    Medical Nutrition Therapy (MNT) (for diabetes or renal disease not recommended schedule)  Requires referral by treating physician for patient with diabetes or renal disease.  Can be provided in same year as diabetes self-management training (DSMT), and CMS recommends medical nutrition therapy take place after DSMT. Up to 3 hours for initial year and 2 hours in subsequent years. N/A  N/A    Prostate Cancer Screening (annually up to age 76)  - Digital rectal exam (ISAEL)  - Prostate specific antigen (PSA)  Annually (age 48 or over), ISAEL not paid separately when covered E/M service is provided on same date  Completed 6/2018 Due 6/2019 or as directed    Seasonal Influenza Vaccination (annually)     Completed Fall 2018     Recommended annually  Due Fall 2019   Pneumococcal Vaccination (once after 72)     Pneumovax - 10/2010     Prevnar 13 - 6/2017     Both recommended once over the age of 72  Completed         Completed    Hepatitis B Vaccinations (if medium/high risk)  Medium/high risk factors: End-stage renal disease,  Hemophiliacs who received Factor VIII or IX concentrates, Clients of institutions for the mentally retarded, Persons who live in the same house as a HepB virus carrier, Homosexual men, Illicit injectable drug abusers. N/A  N/A                        Ultrasound Screening for Abdominal Aortic Aneurysm (AAA) (once)  Patient must be referred through IPPE and not have had a screening for abdominal aortic aneurysm before under Medicare. Limited to patients who meet one of the following criteria:  - Men who are 73-68 years old and have smoked more than 100 cigarettes in their lifetime.  -Anyone with a FH of AAA  -Anyone recommended for screening by USPSTF  Completed 10/2010 - negative for AAA  Completed         PLEASE FOLLOW UP WITH DR Charli Erickson    Please bring in a copy of your advanced directive to your next office visit so we can have a copy on file.

## 2018-12-18 NOTE — PROGRESS NOTES
HISTORY OF PRESENT ILLNESS  Low Husbands is a 80 y.o. male. HPI  Last here 6/13/18. Pt is here to f/u on chronic conditions. Pt presents with his wife.  Vannessa Colby  BP is 121/70   Pt has not been monitoring his BP at home   Continues on lisinopril 5mg daily to protect his kidneys     Wt today is 168 lbs --up 4 lbs x lov   Discussed diet and w/l      Reviewed labs 6/18     Advised him to take vit D OTC 1000U daily       Pt has been taking prilosec prn (infrequently, about 2x per week), as his reflux has not been an issue  He takes this if eats a trigger food  Pt had an EGD cancelled d/t lack of sx doing better     Continues on xyzol daily for allergies - works well  Happy with this  Recall pt is allergic to fungus and pet dander    Pt uses 1 dose of lactulose prn 10ml  which helps with BMs  Now with regular bm      Pt continues on tramadol with meals for his back pain (6 tabs daily)  He tried cutting back on this but was unable to  Pt gets 540 tabs tramadol q3 months  Pt also has percocet to use prn (rarely - last used in >1 year) for more severe back pain  Pain contract signed on 6/14/17  Urine drug screen complete 06/13/18, ordered 12/18/18      Since lov, I referred the pt to Dr. Derinda Gitelman (rheum)  However, he has not scheduled an appt with this rheum  Pt still c/o pain from hand arthritis but hydrocodone helps  Pt uses emu oil for his hand arthritis from [facility]   Pt states that this oil has significantly improved his sx  Not intertested in rheum at this time     Pt follows with Dr. Elaine Núñez (uro) for prostate cancer  Pt states that he tried to contact this clinic to see if he needed an appointment but was unable to   He states that Dr Henry Never dismissed him  Advised him to schedule an appt with this uro   Recall last note 3/30/16:  PSA undetectable, pt doing well no further UTI.   But psa now detectable discussed need to go back   Recall pt's prostate is removed    Reviewed depression screen 12/18/18: +1  Pt states this was just because his wife had to be in the hospital briefly  He is doing well otherwise    Lives with his wife, this is a safe environment    Fully functional independently, takes care of everything at home    No memory concerns    No problems with hearing     ACP not on file. SDM is his son Prudence Peñaloza).     Recall the patient has OA in his hands which bothers him the most, also some in the neck. He continues to take tramadol for this 6 tabs per day. Gets 540 tabs for 3 months. --i prescribe this now, we addressed this last time and he stated he still gets pain and does not think the tramadol does anything but takes it for maintenance anyway       Recall he has a dx of prostate cancer since January 2013 , sees calin annually and last saw him in January 2015--states he was released from care at that time.    Recall the patient has h/o ckd, last cr 1.5 continues to be stable. , prior was 1.77, uns 1.5-1.77  Recall that he Used to follow with rheumatologist dr Lawrence West years ago --currently not seeing anyone. Recall the patient has hansens and a h/o stomach ulcer, sees ry for this.  Sx controlled with prilosec bid , he states he saw him last year or the year before, sx well controlled, only one or 2 breakthrough episodes.        PREVENTIVE:    Colonoscopy: 10/22/13, Dr. Mortimer Flurry, repeat 10 years  EGD: 4/16/16, Dr Mortimer Flurry, repeat 3 years  PSA: 1/13 dx'd with prostate cancer, 3/16, 12/17 0.1, 6/18 0.1, followed by Dr. Katarina Castro  AAA screen: 10/10, negative  Tdap: declines    Pneumovax 10/10    Prevnar 13: 6/14/17  Shingrix: ordered 06/13/18 $$ not getting   Flu shot: Fall 2018  Eye: Dr. Swapna Ramirez, due 1/19  Lipids: 12/17   A1c: 6/16 5.9, 12/16 5.7  Pain contract: signed 6/14/17  Urine drug screen: 6/13/18, c/w tramadol, ordered 12/18/18      Patient Active Problem List    Diagnosis Date Noted    Primary osteoarthritis of both hands 06/13/2018    ACP (advance care planning) 12/11/2015    [facility] 02/18/2013    Syncope 08/21/2011    Chronic kidney disease, stage III (moderate) ([facility] 06/28/2011    Vitamin D deficiency 06/27/2011    DJD (degenerative joint disease), multiple sites 03/09/2010    GERD (gastroesophageal reflux disease) 03/09/2010    History of peptic ulcer disease 03/09/2010    BPH (benign prostatic hypertrophy) 03/09/2010    Perennial allergic rhinitis 03/09/2010     Current Outpatient Medications   Medication Sig Dispense Refill    lisinopril (PRINIVIL, ZESTRIL) 5 mg tablet TAKE ONE TABLET BY MOUTH DAILY 90 Tab 0    levocetirizine (XYZAL) 5 mg tablet TAKE ONE TABLET BY MOUTH EVERY NIGHT AT BEDTIME 90 Tab 1    traMADol (ULTRAM) 50 mg tablet TAKE TWO TABLETS BY MOUTH THREE TIMES A DAY WITH MEALS 540 Tab 0    omeprazole (PRILOSEC) 20 mg capsule Take 1 Cap by mouth two (2) times a day. 180 Cap 3    lactulose (CHRONULAC) 10 gram/15 mL solution Take 5 mL by mouth three (3) times daily. 480 mL 1    Naphazoline-Gly-Benzalk Chl (CLEAR EYES REDNESS RELIEF) 0.012-0.2 % drop Apply 2 Drops to eye daily. Both eyes      acetaminophen (TYLENOL) 500 mg tablet Take 1,000 mg by mouth three (3) times daily.  oxyCODONE-acetaminophen (PERCOCET 7.5) 7.5-325 mg per tablet Take 1 Tab by mouth every eight (8) hours as needed for Pain. Max Daily Amount: 3 Tabs. 30 Tab 0    alum-mag hydroxide-simeth (MYLANTA) 200-200-20 mg/5 mL susp Take 30 mL by mouth daily as needed.        Past Surgical History:   Procedure Laterality Date    ENDOSCOPY, COLON, DIAGNOSTIC  2006    by [facility] MD; normal    HX CATARACT REMOVAL  12/28/2014    both eyes    HX COLONOSCOPY  10/22/2013    HX ENDOSCOPY      Followed by Dr. Opal Jj (multiple)    HX HEENT  1970's    septoplasty    HX ORTHOPAEDIC  2000    right rotator cuff repair    HX ORTHOPAEDIC      right knee total replacement    HX PROSTATECTOMY  2010    green light laser by Dr. Mel Coffman  5/7/13    CYSTOSCOPY WITH OPTICAL INTERNAL URETHROTOMY, AND CRYOABLATION OF PROSTATE      Lab Results   Component Value Date/Time    WBC 6.9 12/14/2017 09:58 AM    HGB 13.1 12/14/2017 09:58 AM    HCT 41.1 12/14/2017 09:58 AM    PLATELET 311 48/98/3651 09:58 AM     (H) 12/14/2017 09:58 AM     Lab Results   Component Value Date/Time    Cholesterol, total 176 12/14/2017 09:58 AM    HDL Cholesterol 44 12/14/2017 09:58 AM    LDL, calculated 103 (H) 12/14/2017 09:58 AM    Triglyceride 145 12/14/2017 09:58 AM    CHOL/HDL Ratio 3.6 06/25/2010 08:36 AM     Lab Results   Component Value Date/Time    GFR est non-AA 39 (L) 06/28/2018 09:19 AM    GFR est AA 45 (L) 06/28/2018 09:19 AM    Creatinine 1.62 (H) 06/28/2018 09:19 AM    BUN 30 (H) 06/28/2018 09:19 AM    Sodium 143 06/28/2018 09:19 AM    Potassium 4.9 06/28/2018 09:19 AM    Chloride 106 06/28/2018 09:19 AM    CO2 23 06/28/2018 09:19 AM        Review of Systems   Respiratory: Negative for shortness of breath. Cardiovascular: Negative for chest pain. Physical Exam   Constitutional: He is oriented to person, place, and time. He appears well-developed and well-nourished. No distress. HENT:   Head: Normocephalic and atraumatic. Mouth/Throat: Oropharynx is clear and moist. No oropharyngeal exudate. Eyes: Conjunctivae and EOM are normal. Right eye exhibits no discharge. Left eye exhibits no discharge. Neck: Normal range of motion. Neck supple. No carotid bruits    Cardiovascular: Normal rate, regular rhythm and normal heart sounds. Exam reveals no gallop and no friction rub. No murmur heard. Pulmonary/Chest: Effort normal and breath sounds normal. No respiratory distress. He has no wheezes. He has no rales. He exhibits no tenderness. Abdominal: Soft. He exhibits no distension and no mass. There is no tenderness. There is no rebound and no guarding. Musculoskeletal: Normal range of motion. He exhibits no edema, tenderness or deformity.    Lymphadenopathy:     He has no cervical adenopathy. Neurological: He is alert and oriented to person, place, and time. He has normal reflexes. Coordination normal.   Skin: Skin is warm and dry. No rash noted. He is not diaphoretic. No erythema. No pallor. Psychiatric: He has a normal mood and affect. His behavior is normal.       ASSESSMENT and PLAN    ICD-10-CM ICD-9-CM    1. Medicare annual wellness visit, subsequent Z00.00 V70.0 LIPID PANEL      METABOLIC PANEL, COMPREHENSIVE      CBC W/O DIFF      PSA, DIAGNOSTIC (PROSTATE SPECIFIC AG)      T4, FREE      TSH 3RD GENERATION   2. Chronic kidney disease, stage III (moderate) (HCC)    Cr runs ~1.6-1.8 baseline, was stable last summer when last checked, I currently monitor renal function, will repeat BMP today   N18.3 585.3 LIPID PANEL      METABOLIC PANEL, COMPREHENSIVE      CBC W/O DIFF      PSA, DIAGNOSTIC (PROSTATE SPECIFIC AG)      T4, FREE      TSH 3RD GENERATION   3. Prostate cancer (HCC)    PSA 0.1 again on last labs, in the past he reports it was undetectable, I again asked him to see Dr Manolo Montalvo, he has not gone, explained the rational that this could indicate biochemical recurrence of prostate cancer   C61 185 LIPID PANEL      METABOLIC PANEL, COMPREHENSIVE      CBC W/O DIFF      PSA, DIAGNOSTIC (PROSTATE SPECIFIC AG)      T4, FREE      TSH 3RD GENERATION   4. Gastroesophageal reflux disease without esophagitis    Controlled on prilosec prn, just a few times per week   K21.9 530.81 LIPID PANEL      METABOLIC PANEL, COMPREHENSIVE      CBC W/O DIFF      PSA, DIAGNOSTIC (PROSTATE SPECIFIC AG)      T4, FREE      TSH 3RD GENERATION   5. Perennial allergic rhinitis    Continues xyzol   J30.89 477.8 LIPID PANEL      METABOLIC PANEL, COMPREHENSIVE      CBC W/O DIFF      PSA, DIAGNOSTIC (PROSTATE SPECIFIC AG)      T4, FREE      TSH 3RD GENERATION   6.  Primary osteoarthritis of both hands    Controlled with tramadol 540 tablets per 3 months, takes 6 tabs per day, has been stable on this regimen for many years, urine drug screen to be obtained,  reviewed, pain contract was signed, no signs of abuse   M19.041 715.14 LIPID PANEL    N85.313  METABOLIC PANEL, COMPREHENSIVE      CBC W/O DIFF      PSA, DIAGNOSTIC (PROSTATE SPECIFIC AG)      T4, FREE      TSH 3RD GENERATION   7. Chronic idiopathic constipation    Much improved with lactulose 10ml per day   K59.04 564.00 LIPID PANEL      METABOLIC PANEL, COMPREHENSIVE      CBC W/O DIFF      PSA, DIAGNOSTIC (PROSTATE SPECIFIC AG)      T4, FREE      TSH 3RD GENERATION   8. Dyslipidemia    Check lipids fasting, diet controlled   E78.5 272.4 LIPID PANEL      Depression screen reviewed and positive (1). Pt was just stressed from his wife being in the hospital briefly, is doing alright now. Scribed by Alonso Mixon of [facility], as dictated by Dr. Yessica Tolbert. Current diagnosis and concerns discussed with pt at length. Pt understands risks and benefits or current treatment plan and medications, and accepts the treatment and medication with any possible risks. Pt asks appropriate questions, which were answered. Pt was instructed to call with any concerns or problems. I have reviewed the note documented by the scribe. The services provided are my own.   The documentation is accurate

## 2018-12-18 NOTE — PROGRESS NOTES
This is the Subsequent Medicare Annual Wellness Exam, performed 12 months or more after the Initial AWV or the last Subsequent AWV    I have reviewed the patient's medical history in detail and updated the computerized patient record. History     Past Medical History:   Diagnosis Date    Arthritis     severe diffuse DJD    Calculus of kidney 1988    2 bouts    Cancer [facility])     prostate    Chronic kidney disease 2010 urinary obstruction diagnosed    due to obstructive uropathy and ? NSAID use long term    GERD (gastroesophageal reflux disease) 6/2009    with mild Chin's    Hematuria, microscopic 1970's ?    negative cysto and imaging    Other ill-defined conditions(799.89) 2006    blood transfusion hx    Perennial allergic rhinitis     mold --per Dr. Kirstie Self    PUD (peptic ulcer disease) 2006    stomach    Syncope 8/21/2011      Past Surgical History:   Procedure Laterality Date    ENDOSCOPY, COLON, DIAGNOSTIC  2006    by [facility] MD; normal    HX CATARACT REMOVAL  12/28/2014    both eyes    HX COLONOSCOPY  10/22/2013    HX ENDOSCOPY      Followed by Dr. Louisa Encarnacion (multiple)    HX HEENT  1970's    septoplasty    HX ORTHOPAEDIC  2000    right rotator cuff repair    HX ORTHOPAEDIC      right knee total replacement    HX PROSTATECTOMY  2010    green light laser by Dr. Gerard Oliver  5/7/13    CYSTOSCOPY WITH OPTICAL INTERNAL URETHROTOMY, AND CRYOABLATION OF PROSTATE     Current Outpatient Medications   Medication Sig Dispense Refill    lactulose (CHRONULAC) 10 gram/15 mL solution Take 5 mL by mouth three (3) times daily.  480 mL 1    lisinopril (PRINIVIL, ZESTRIL) 5 mg tablet TAKE ONE TABLET BY MOUTH DAILY 90 Tab 0    levocetirizine (XYZAL) 5 mg tablet TAKE ONE TABLET BY MOUTH EVERY NIGHT AT BEDTIME 90 Tab 1    traMADol (ULTRAM) 50 mg tablet TAKE TWO TABLETS BY MOUTH THREE TIMES A DAY WITH MEALS 540 Tab 0    omeprazole (PRILOSEC) 20 mg capsule Take 1 Cap by mouth two (2) times a day. 180 Cap 3    Naphazoline-Gly-Benzalk Chl (CLEAR EYES REDNESS RELIEF) 0.012-0.2 % drop Apply 2 Drops to eye daily. Both eyes      acetaminophen (TYLENOL) 500 mg tablet Take 1,000 mg by mouth three (3) times daily.  oxyCODONE-acetaminophen (PERCOCET 7.5) 7.5-325 mg per tablet Take 1 Tab by mouth every eight (8) hours as needed for Pain. Max Daily Amount: 3 Tabs. 30 Tab 0    alum-mag hydroxide-simeth (MYLANTA) 200-200-20 mg/5 mL susp Take 30 mL by mouth daily as needed.        No Known Allergies  Family History   Problem Relation Age of Onset    Heart Disease Mother     Stroke Mother     Hypertension Mother     Arthritis-osteo Mother         neck and spine; back surgeries x5    Dementia Father         Alzheimer's type    Pneumonia Father         cause of death   [facility] Arthritis-osteo Sister     Other Son         [employer] (unsure where)    No Known Problems Daughter      Social History     Tobacco Use    Smoking status: Former Smoker     Packs/day: 2.00     Years: 10.00     Pack years: 20.00     Types: Cigarettes     Last attempt to quit: 1961     Years since quittin.9    Smokeless tobacco: Never Used    Tobacco comment: When quit - over 3ppd   Substance Use Topics    Alcohol use: No     Alcohol/week: 0.0 oz     Comment: quit completely in       Patient Active Problem List   Diagnosis Code    DJD (degenerative joint disease), multiple sites M15.9    GERD (gastroesophageal reflux disease) K21.9    History of peptic ulcer disease Z87.11    BPH (benign prostatic hypertrophy) N40.0    Perennial allergic rhinitis J30.89    Vitamin D deficiency E55.9    Chronic kidney disease, stage III (moderate) ([facility] N18.3    Syncope R55    Prostate cancer ([facility] C61    ACP (advance care planning) Z71.89    Primary osteoarthritis of both hands M19.041, M19.042       Depression Risk Factor Screening:     PHQ over the last two weeks 2018   Little interest or pleasure in doing things Not at all Feeling down, depressed, irritable, or hopeless Several days   Total Score PHQ 2 1   no meds needed sad b/c wife was in hospital , not depressed   Alcohol Risk Factor Screening: You do not drink alcohol or very rarely. Functional Ability and Level of Safety:   Hearing Loss  Hearing is good. Activities of Daily Living  The home contains: no safety equipment. Patient does total self care    Fall Risk  Fall Risk Assessment, last 12 mths 12/18/2018   Able to walk? Yes   Fall in past 12 months? No       Abuse Screen  Patient is not abused    Cognitive Screening   Evaluation of Cognitive Function:  Has your family/caregiver stated any concerns about your memory: no  Normal    Patient Care Team   Patient Care Team:  Jaky Lovett MD as PCP - General (Internal Medicine)  Nick Baca (Dental General Practice)  Lauren Daniels MD (Urology)  Domonique Obrien MD (Orthopedic Surgery)  Adriana Ball (Dermatology)  Gino Whipple MD (Gastroenterology)  Ximena Azar MD (Optometry)   updated    Assessment/Plan   Education and counseling provided:  Are appropriate based on today's review and evaluation  End-of-Life planning (with patient's consent)  Cardiovascular screening blood test  Screening for glaucoma  Diabetes screening test    Diagnoses and all orders for this visit:    1. Medicare annual wellness visit, subsequent    Other orders  -     lactulose (CHRONULAC) 10 gram/15 mL solution; Take 5 mL by mouth three (3) times daily. Health Maintenance Due   Topic Date Due    DTaP/Tdap/Td series (1 - Tdap) 06/17/1956    Shingrix Vaccine Age 50> (1 of 2) 06/17/1985    GLAUCOMA SCREENING Q2Y  12/29/2016    MEDICARE YEARLY EXAM  12/14/2018     Discussed with patient about advance medical directive. Provided patient blank AMD and Your Right to Decide Booklet. Requested that if completed to provide a copy of AMD to office. ACP not on file. SDM is his son Katarina Aguilera).         Colonoscopy: 10/22/13, Dr. Edilma Saldivar, repeat 10 years      AAA screen: 10/10, negative  Tdap: declines    Pneumovax 10/10    Prevnar 13: 6/14/17  Shingrix: ordered 06/13/18 $$ not getting   Flu shot: Fall 2018      Eye: Dr. Mylene Saini, due 1/19    PSA:  6/18 0.1, due   Lipids: 12/17   Due now  Fasting glucose 12/17 --due      Medication reconciliation completed by MA and reviewed by me. Medical/surgical/social/family history reviewed and updated by me. Patient provided AVS and preventative screening table. Patient verbalized understanding of all information discussed.

## 2018-12-21 ENCOUNTER — APPOINTMENT (OUTPATIENT)
Dept: INTERNAL MEDICINE CLINIC | Age: 83
End: 2018-12-21

## 2018-12-21 DIAGNOSIS — R82.998 LEUKOCYTES IN URINE: ICD-10-CM

## 2018-12-21 DIAGNOSIS — F11.90 CHRONIC NARCOTIC USE: Primary | ICD-10-CM

## 2018-12-22 LAB
ALBUMIN SERPL-MCNC: 4.4 G/DL (ref 3.5–4.7)
ALBUMIN/GLOB SERPL: 1.8 {RATIO} (ref 1.2–2.2)
ALP SERPL-CCNC: 73 IU/L (ref 39–117)
ALT SERPL-CCNC: 14 IU/L (ref 0–44)
AST SERPL-CCNC: 19 IU/L (ref 0–40)
BILIRUB SERPL-MCNC: 0.6 MG/DL (ref 0–1.2)
BUN SERPL-MCNC: 31 MG/DL (ref 8–27)
BUN/CREAT SERPL: 19 (ref 10–24)
CALCIUM SERPL-MCNC: 9.4 MG/DL (ref 8.6–10.2)
CHLORIDE SERPL-SCNC: 107 MMOL/L (ref 96–106)
CHOLEST SERPL-MCNC: 164 MG/DL (ref 100–199)
CO2 SERPL-SCNC: 21 MMOL/L (ref 20–29)
CREAT SERPL-MCNC: 1.65 MG/DL (ref 0.76–1.27)
ERYTHROCYTE [DISTWIDTH] IN BLOOD BY AUTOMATED COUNT: 13.1 % (ref 12.3–15.4)
GLOBULIN SER CALC-MCNC: 2.5 G/DL (ref 1.5–4.5)
GLUCOSE SERPL-MCNC: 123 MG/DL (ref 65–99)
HCT VFR BLD AUTO: 38 % (ref 37.5–51)
HDLC SERPL-MCNC: 44 MG/DL
HGB BLD-MCNC: 12.7 G/DL (ref 13–17.7)
LDLC SERPL CALC-MCNC: 99 MG/DL (ref 0–99)
MCH RBC QN AUTO: 33.1 PG (ref 26.6–33)
MCHC RBC AUTO-ENTMCNC: 33.4 G/DL (ref 31.5–35.7)
MCV RBC AUTO: 99 FL (ref 79–97)
PLATELET # BLD AUTO: 191 X10E3/UL (ref 150–379)
POTASSIUM SERPL-SCNC: 5.8 MMOL/L (ref 3.5–5.2)
PROT SERPL-MCNC: 6.9 G/DL (ref 6–8.5)
PSA SERPL-MCNC: 0.2 NG/ML (ref 0–4)
RBC # BLD AUTO: 3.84 X10E6/UL (ref 4.14–5.8)
SODIUM SERPL-SCNC: 142 MMOL/L (ref 134–144)
T4 FREE SERPL-MCNC: 1.26 NG/DL (ref 0.82–1.77)
TRIGL SERPL-MCNC: 105 MG/DL (ref 0–149)
TSH SERPL DL<=0.005 MIU/L-ACNC: 0.36 UIU/ML (ref 0.45–4.5)
VLDLC SERPL CALC-MCNC: 21 MG/DL (ref 5–40)
WBC # BLD AUTO: 5.4 X10E3/UL (ref 3.4–10.8)

## 2018-12-22 NOTE — PROGRESS NOTES
psa climbed again --needs to f/u with his urologist dr Geena Brumfield him with appointment, fax results to [place]    Kidneys stable    Borderline fast thyroid--no meds needed will monitor for now     Stable mild anemia related to kidney dysfunction

## 2018-12-23 LAB — BACTERIA UR CULT: ABNORMAL

## 2018-12-24 ENCOUNTER — TELEPHONE (OUTPATIENT)
Dept: INTERNAL MEDICINE CLINIC | Age: 83
End: 2018-12-24

## 2018-12-24 RX ORDER — CIPROFLOXACIN 250 MG/1
250 TABLET, FILM COATED ORAL 2 TIMES DAILY
Qty: 6 TAB | Refills: 0 | OUTPATIENT
Start: 2018-12-24 | End: 2019-04-08

## 2018-12-24 NOTE — PROGRESS NOTES
Attempted to reach patient at numbers listed. No answer & no VM available to LM. Will attempt again at a later time.

## 2018-12-24 NOTE — TELEPHONE ENCOUNTER
Unable to reach patient by phone. Requested Prescriptions     Pending Prescriptions Disp Refills    ciprofloxacin HCl (CIPRO) 250 mg tablet 6 Tab 0     Sig: Take 1 Tab by mouth two (2) times a day. Phoned into pharmacy on file.

## 2018-12-24 NOTE — TELEPHONE ENCOUNTER
----- Message from Celestine Jolley MD sent at 12/23/2018  4:31 PM EST -----  Treat with cipro 250mg bid for 3 days

## 2018-12-28 LAB — DRUGS UR: NORMAL

## 2019-02-11 ENCOUNTER — TELEPHONE (OUTPATIENT)
Dept: INTERNAL MEDICINE CLINIC | Age: 84
End: 2019-02-11

## 2019-02-11 NOTE — TELEPHONE ENCOUNTER
Spoke with Crystal Pascual with [employer]. Tamica informed Tramadol is 2 tabs TID. Verbal order given to verify tramadol prescription. Tamica filling prescription.

## 2019-02-11 NOTE — TELEPHONE ENCOUNTER
Junior Ann with [employer] [place] is requesting verification on the instructions for the Rx Tramadol, with two tablets three times a day normally, but the script is now written for one tablets three times a day. If it is two tablets three times a day please resend.        Copy/paste [place]

## 2019-02-15 ENCOUNTER — TELEPHONE (OUTPATIENT)
Dept: INTERNAL MEDICINE CLINIC | Age: 84
End: 2019-02-15

## 2019-02-15 NOTE — TELEPHONE ENCOUNTER
Spoke to pharmacist Marely sarabia) at [facility]. Pharmacist informed that pt's tramadol is to be 2 tabs TID and NOT 1 tab TID as documented (2/11/19). Pharmacist states rx is ready.

## 2019-02-18 DIAGNOSIS — G89.4 CHRONIC PAIN SYNDROME: ICD-10-CM

## 2019-02-18 DIAGNOSIS — E78.5 DYSLIPIDEMIA: ICD-10-CM

## 2019-02-18 DIAGNOSIS — R73.01 IFG (IMPAIRED FASTING GLUCOSE): ICD-10-CM

## 2019-02-18 DIAGNOSIS — E55.9 VITAMIN D DEFICIENCY: ICD-10-CM

## 2019-02-18 DIAGNOSIS — N18.30 CHRONIC KIDNEY DISEASE, STAGE III (MODERATE) (HCC): ICD-10-CM

## 2019-02-18 RX ORDER — LEVOCETIRIZINE DIHYDROCHLORIDE 5 MG/1
TABLET, FILM COATED ORAL
Qty: 90 TAB | Refills: 0 | Status: SHIPPED | OUTPATIENT
Start: 2019-02-18 | End: 2019-06-13 | Stop reason: SDUPTHER

## 2019-02-18 RX ORDER — LISINOPRIL 5 MG/1
TABLET ORAL
Qty: 90 TAB | Refills: 0 | Status: SHIPPED | OUTPATIENT
Start: 2019-02-18 | End: 2019-05-16 | Stop reason: SDUPTHER

## 2019-04-08 ENCOUNTER — HOSPITAL ENCOUNTER (EMERGENCY)
Age: 84
Discharge: HOME | End: 2019-04-08
Attending: EMERGENCY MEDICINE
Payer: MEDICARE

## 2019-04-08 ENCOUNTER — APPOINTMENT (OUTPATIENT)
Dept: GENERAL RADIOLOGY | Age: 84
End: 2019-04-08
Attending: PHYSICIAN ASSISTANT
Payer: MEDICARE

## 2019-04-08 ENCOUNTER — APPOINTMENT (OUTPATIENT)
Dept: CT IMAGING | Age: 84
End: 2019-04-08
Attending: EMERGENCY MEDICINE
Payer: MEDICARE

## 2019-04-08 VITALS
HEART RATE: 82 BPM | HEIGHT: 70 IN | OXYGEN SATURATION: 98 % | TEMPERATURE: 98.4 F | SYSTOLIC BLOOD PRESSURE: 118 MMHG | DIASTOLIC BLOOD PRESSURE: 71 MMHG | WEIGHT: 166.45 LBS | BODY MASS INDEX: 23.83 KG/M2 | RESPIRATION RATE: 25 BRPM

## 2019-04-08 DIAGNOSIS — E86.0 DEHYDRATION: ICD-10-CM

## 2019-04-08 DIAGNOSIS — J20.9 ACUTE BRONCHITIS, UNSPECIFIED ORGANISM: Primary | ICD-10-CM

## 2019-04-08 DIAGNOSIS — R06.02 SHORTNESS OF BREATH: ICD-10-CM

## 2019-04-08 LAB
ALBUMIN SERPL-MCNC: 3.8 G/DL (ref 3.5–5)
ALBUMIN/GLOB SERPL: 1 {RATIO} (ref 1.1–2.2)
ALP SERPL-CCNC: 96 U/L (ref 45–117)
ALT SERPL-CCNC: 27 U/L (ref 12–78)
ANION GAP SERPL CALC-SCNC: 8 MMOL/L (ref 5–15)
APPEARANCE UR: ABNORMAL
AST SERPL-CCNC: 23 U/L (ref 15–37)
ATRIAL RATE: 102 BPM
BACTERIA URNS QL MICRO: ABNORMAL /HPF
BASOPHILS # BLD: 0 K/UL (ref 0–0.1)
BASOPHILS NFR BLD: 0 % (ref 0–1)
BILIRUB SERPL-MCNC: 0.8 MG/DL (ref 0.2–1)
BILIRUB UR QL: NEGATIVE
BUN SERPL-MCNC: 36 MG/DL (ref 6–20)
BUN/CREAT SERPL: 20 (ref 12–20)
CALCIUM SERPL-MCNC: 9 MG/DL (ref 8.5–10.1)
CALCULATED P AXIS, ECG09: 60 DEGREES
CALCULATED R AXIS, ECG10: 18 DEGREES
CALCULATED T AXIS, ECG11: 45 DEGREES
CHLORIDE SERPL-SCNC: 110 MMOL/L (ref 97–108)
CK MB CFR SERPL CALC: NORMAL % (ref 0–2.5)
CK MB SERPL-MCNC: <1 NG/ML (ref 5–25)
CK SERPL-CCNC: 100 U/L (ref 39–308)
CO2 SERPL-SCNC: 22 MMOL/L (ref 21–32)
COLOR UR: ABNORMAL
CREAT SERPL-MCNC: 1.82 MG/DL (ref 0.7–1.3)
D DIMER PPP FEU-MCNC: 0.63 MG/L FEU (ref 0–0.65)
DIAGNOSIS, 93000: NORMAL
DIFFERENTIAL METHOD BLD: NORMAL
EOSINOPHIL # BLD: 0.1 K/UL (ref 0–0.4)
EOSINOPHIL NFR BLD: 2 % (ref 0–7)
EPITH CASTS URNS QL MICRO: ABNORMAL /LPF
ERYTHROCYTE [DISTWIDTH] IN BLOOD BY AUTOMATED COUNT: 12.4 % (ref 11.5–14.5)
FLUAV AG NPH QL IA: NEGATIVE
FLUBV AG NOSE QL IA: NEGATIVE
GLOBULIN SER CALC-MCNC: 3.8 G/DL (ref 2–4)
GLUCOSE SERPL-MCNC: 99 MG/DL (ref 65–100)
GLUCOSE UR STRIP.AUTO-MCNC: NEGATIVE MG/DL
HCT VFR BLD AUTO: 41 % (ref 36.6–50.3)
HGB BLD-MCNC: 13.7 G/DL (ref 12.1–17)
HGB UR QL STRIP: ABNORMAL
IMM GRANULOCYTES # BLD AUTO: 0 K/UL (ref 0–0.04)
IMM GRANULOCYTES NFR BLD AUTO: 0 % (ref 0–0.5)
KETONES UR QL STRIP.AUTO: NEGATIVE MG/DL
LACTATE SERPL-SCNC: 2.2 MMOL/L (ref 0.4–2)
LACTATE SERPL-SCNC: 3.3 MMOL/L (ref 0.4–2)
LACTATE SERPL-SCNC: 3.4 MMOL/L (ref 0.4–2)
LEUKOCYTE ESTERASE UR QL STRIP.AUTO: ABNORMAL
LYMPHOCYTES # BLD: 2.2 K/UL (ref 0.8–3.5)
LYMPHOCYTES NFR BLD: 34 % (ref 12–49)
MCH RBC QN AUTO: 32.3 PG (ref 26–34)
MCHC RBC AUTO-ENTMCNC: 33.4 G/DL (ref 30–36.5)
MCV RBC AUTO: 96.7 FL (ref 80–99)
MONOCYTES # BLD: 0.4 K/UL (ref 0–1)
MONOCYTES NFR BLD: 7 % (ref 5–13)
MUCOUS THREADS URNS QL MICRO: ABNORMAL /LPF
NEUTS SEG # BLD: 3.8 K/UL (ref 1.8–8)
NEUTS SEG NFR BLD: 57 % (ref 32–75)
NITRITE UR QL STRIP.AUTO: NEGATIVE
NRBC # BLD: 0 K/UL (ref 0–0.01)
NRBC BLD-RTO: 0 PER 100 WBC
P-R INTERVAL, ECG05: 138 MS
PH UR STRIP: 8.5 [PH] (ref 5–8)
PLATELET # BLD AUTO: 168 K/UL (ref 150–400)
PMV BLD AUTO: 10.3 FL (ref 8.9–12.9)
POTASSIUM SERPL-SCNC: 4.3 MMOL/L (ref 3.5–5.1)
PROT SERPL-MCNC: 7.6 G/DL (ref 6.4–8.2)
PROT UR STRIP-MCNC: NEGATIVE MG/DL
Q-T INTERVAL, ECG07: 314 MS
QRS DURATION, ECG06: 86 MS
QTC CALCULATION (BEZET), ECG08: 409 MS
RBC # BLD AUTO: 4.24 M/UL (ref 4.1–5.7)
RBC #/AREA URNS HPF: ABNORMAL /HPF (ref 0–5)
SODIUM SERPL-SCNC: 140 MMOL/L (ref 136–145)
SP GR UR REFRACTOMETRY: 1.01 (ref 1–1.03)
TROPONIN I BLD-MCNC: <0.04 NG/ML (ref 0–0.08)
TROPONIN I SERPL-MCNC: <0.05 NG/ML
UA: UC IF INDICATED,UAUC: ABNORMAL
UROBILINOGEN UR QL STRIP.AUTO: 0.2 EU/DL (ref 0.2–1)
VENTRICULAR RATE, ECG03: 102 BPM
WBC # BLD AUTO: 6.5 K/UL (ref 4.1–11.1)
WBC URNS QL MICRO: ABNORMAL /HPF (ref 0–4)

## 2019-04-08 PROCEDURE — 87077 CULTURE AEROBIC IDENTIFY: CPT

## 2019-04-08 PROCEDURE — 87186 SC STD MICRODIL/AGAR DIL: CPT

## 2019-04-08 PROCEDURE — 74011636637 HC RX REV CODE- 636/637: Performed by: EMERGENCY MEDICINE

## 2019-04-08 PROCEDURE — 84484 ASSAY OF TROPONIN QUANT: CPT

## 2019-04-08 PROCEDURE — 82550 ASSAY OF CK (CPK): CPT

## 2019-04-08 PROCEDURE — 96360 HYDRATION IV INFUSION INIT: CPT

## 2019-04-08 PROCEDURE — 93005 ELECTROCARDIOGRAM TRACING: CPT

## 2019-04-08 PROCEDURE — 74011250637 HC RX REV CODE- 250/637: Performed by: EMERGENCY MEDICINE

## 2019-04-08 PROCEDURE — 85025 COMPLETE CBC W/AUTO DIFF WBC: CPT

## 2019-04-08 PROCEDURE — 74011000250 HC RX REV CODE- 250: Performed by: EMERGENCY MEDICINE

## 2019-04-08 PROCEDURE — 80053 COMPREHEN METABOLIC PANEL: CPT

## 2019-04-08 PROCEDURE — 96361 HYDRATE IV INFUSION ADD-ON: CPT

## 2019-04-08 PROCEDURE — 99285 EMERGENCY DEPT VISIT HI MDM: CPT

## 2019-04-08 PROCEDURE — 83605 ASSAY OF LACTIC ACID: CPT

## 2019-04-08 PROCEDURE — 87086 URINE CULTURE/COLONY COUNT: CPT

## 2019-04-08 PROCEDURE — 74011250636 HC RX REV CODE- 250/636: Performed by: EMERGENCY MEDICINE

## 2019-04-08 PROCEDURE — 81001 URINALYSIS AUTO W/SCOPE: CPT

## 2019-04-08 PROCEDURE — 71250 CT THORAX DX C-: CPT

## 2019-04-08 PROCEDURE — 36415 COLL VENOUS BLD VENIPUNCTURE: CPT

## 2019-04-08 PROCEDURE — 85379 FIBRIN DEGRADATION QUANT: CPT

## 2019-04-08 PROCEDURE — 87804 INFLUENZA ASSAY W/OPTIC: CPT

## 2019-04-08 PROCEDURE — 71046 X-RAY EXAM CHEST 2 VIEWS: CPT

## 2019-04-08 PROCEDURE — 82553 CREATINE MB FRACTION: CPT

## 2019-04-08 RX ORDER — IPRATROPIUM BROMIDE AND ALBUTEROL SULFATE 2.5; .5 MG/3ML; MG/3ML
3 SOLUTION RESPIRATORY (INHALATION)
Status: COMPLETED | OUTPATIENT
Start: 2019-04-08 | End: 2019-04-08

## 2019-04-08 RX ORDER — PREDNISONE 20 MG/1
60 TABLET ORAL DAILY
Qty: 12 TAB | Refills: 0 | Status: SHIPPED | OUTPATIENT
Start: 2019-04-08 | End: 2019-04-12

## 2019-04-08 RX ORDER — SODIUM CHLORIDE 9 MG/ML
1000 INJECTION, SOLUTION INTRAVENOUS ONCE
Status: DISCONTINUED | OUTPATIENT
Start: 2019-04-08 | End: 2019-04-08 | Stop reason: HOSPADM

## 2019-04-08 RX ORDER — BENZONATATE 100 MG/1
200 CAPSULE ORAL ONCE
Status: COMPLETED | OUTPATIENT
Start: 2019-04-08 | End: 2019-04-08

## 2019-04-08 RX ORDER — DOXYCYCLINE HYCLATE 100 MG
100 TABLET ORAL
Status: COMPLETED | OUTPATIENT
Start: 2019-04-08 | End: 2019-04-08

## 2019-04-08 RX ORDER — PREDNISONE 20 MG/1
60 TABLET ORAL
Status: COMPLETED | OUTPATIENT
Start: 2019-04-08 | End: 2019-04-08

## 2019-04-08 RX ORDER — SODIUM CHLORIDE 9 MG/ML
1000 INJECTION, SOLUTION INTRAVENOUS ONCE
Status: COMPLETED | OUTPATIENT
Start: 2019-04-08 | End: 2019-04-08

## 2019-04-08 RX ORDER — CEFDINIR 300 MG/1
300 CAPSULE ORAL 2 TIMES DAILY
Qty: 14 CAP | Refills: 0 | Status: SHIPPED | OUTPATIENT
Start: 2019-04-08 | End: 2019-04-15

## 2019-04-08 RX ORDER — DOXYCYCLINE HYCLATE 100 MG
100 TABLET ORAL 2 TIMES DAILY
Qty: 14 TAB | Refills: 0 | Status: SHIPPED | OUTPATIENT
Start: 2019-04-08 | End: 2019-04-15

## 2019-04-08 RX ORDER — BENZONATATE 100 MG/1
100 CAPSULE ORAL
Qty: 30 CAP | Refills: 0 | Status: SHIPPED | OUTPATIENT
Start: 2019-04-08 | End: 2019-04-15

## 2019-04-08 RX ADMIN — IPRATROPIUM BROMIDE AND ALBUTEROL SULFATE 3 ML: .5; 3 SOLUTION RESPIRATORY (INHALATION) at 12:12

## 2019-04-08 RX ADMIN — SODIUM CHLORIDE 1000 ML: 900 INJECTION, SOLUTION INTRAVENOUS at 09:10

## 2019-04-08 RX ADMIN — DOXYCYCLINE HYCLATE 100 MG: 100 TABLET, COATED ORAL at 13:23

## 2019-04-08 RX ADMIN — BENZONATATE 200 MG: 100 CAPSULE ORAL at 09:17

## 2019-04-08 RX ADMIN — PREDNISONE 60 MG: 20 TABLET ORAL at 13:23

## 2019-04-08 RX ADMIN — SODIUM CHLORIDE 1000 ML: 900 INJECTION, SOLUTION INTRAVENOUS at 11:39

## 2019-04-08 RX ADMIN — IPRATROPIUM BROMIDE AND ALBUTEROL SULFATE 3 ML: .5; 3 SOLUTION RESPIRATORY (INHALATION) at 09:17

## 2019-04-08 NOTE — ED NOTES
Pt ambulatory around nurses station with this RN. SpO2 remained >99%, HR 99. Pt denies SOB, dizziness. MD aware.

## 2019-04-08 NOTE — DISCHARGE INSTRUCTIONS
Patient Education        Bronchitis: Care Instructions  Your Care Instructions    Bronchitis is inflammation of the bronchial tubes, which carry air to the lungs. The tubes swell and produce mucus, or phlegm. The mucus and inflamed bronchial tubes make you cough. You may have trouble breathing. Most cases of bronchitis are caused by viruses like those that cause colds. Antibiotics usually do not help and they may be harmful. Bronchitis usually develops rapidly and lasts about 2 to 3 weeks in otherwise healthy people. Follow-up care is a key part of your treatment and safety. Be sure to make and go to all appointments, and call your doctor if you are having problems. It's also a good idea to know your test results and keep a list of the medicines you take. How can you care for yourself at home? · Take all medicines exactly as prescribed. Call your doctor if you think you are having a problem with your medicine. · Get some extra rest.  · Take an over-the-counter pain medicine, such as acetaminophen (Tylenol), ibuprofen (Advil, Motrin), or naproxen (Aleve) to reduce fever and relieve body aches. Read and follow all instructions on the label. · Do not take two or more pain medicines at the same time unless the doctor told you to. Many pain medicines have acetaminophen, which is Tylenol. Too much acetaminophen (Tylenol) can be harmful. · Take an over-the-counter cough medicine that contains dextromethorphan to help quiet a dry, hacking cough so that you can sleep. Avoid cough medicines that have more than one active ingredient. Read and follow all instructions on the label. · Breathe moist air from a humidifier, hot shower, or sink filled with hot water. The heat and moisture will thin mucus so you can cough it out. · Do not smoke. Smoking can make bronchitis worse. If you need help quitting, talk to your doctor about stop-smoking programs and medicines.  These can increase your chances of quitting for good.  When should you call for help? Call 911 anytime you think you may need emergency care. For example, call if:    · You have severe trouble breathing.    Call your doctor now or seek immediate medical care if:    · You have new or worse trouble breathing.     · You cough up dark brown or bloody mucus (sputum).     · You have a new or higher fever.     · You have a new rash.    Watch closely for changes in your health, and be sure to contact your doctor if:    · You cough more deeply or more often, especially if you notice more mucus or a change in the color of your mucus.     · You are not getting better as expected. Where can you learn more? Go to http://annabelleNeuroSigmaashlee.info/. Enter H333 in the search box to learn more about \"Bronchitis: Care Instructions. \"  Current as of: September 5, 2018  Content Version: 11.9  © 7306-4112 [employer]. Care instructions adapted under license by [employer] (which disclaims liability or warranty for this information). If you have questions about a medical condition or this instruction, always ask your healthcare professional. [place] [address] any warranty or liability for your use of this information. Patient Education        Dehydration: Care Instructions  Your Care Instructions  Dehydration happens when your body loses too much fluid. This might happen when you do not drink enough water or you lose large amounts of fluids from your body because of diarrhea, vomiting, or sweating. Severe dehydration can be life-threatening. Water and minerals called electrolytes help put your body fluids back in balance. Learn the early signs of fluid loss, and drink more fluids to prevent dehydration. Follow-up care is a key part of your treatment and safety. Be sure to make and go to all appointments, and call your doctor if you are having problems.  It's also a good idea to know your test results and keep a list of the medicines you take. How can you care for yourself at home? · To prevent dehydration, drink plenty of fluids, enough so that your urine is light yellow or clear like water. Choose water and other caffeine-free clear liquids until you feel better. If you have kidney, heart, or liver disease and have to limit fluids, talk with your doctor before you increase the amount of fluids you drink. · If you do not feel like eating or drinking, try taking small sips of water, sports drinks, or other rehydration drinks. · Get plenty of rest.  To prevent dehydration  · Add more fluids to your diet and daily routine, unless your doctor has told you not to. · During hot weather, drink more fluids. Drink even more fluids if you exercise a lot. Stay away from drinks with alcohol or caffeine. · Watch for the symptoms of dehydration. These include:  ? A dry, sticky mouth. ? Dark yellow urine, and not much of it. ? Dry and sunken eyes. ? Feeling very tired. · Learn what problems can lead to dehydration. These include:  ? Diarrhea, fever, and vomiting. ? Any illness with a fever, such as pneumonia or the flu. ? Activities that cause heavy sweating, such as endurance races and heavy outdoor work in hot or humid weather. ? Alcohol or drug abuse or withdrawal.  ? Certain medicines, such as cold and allergy pills (antihistamines), diet pills (diuretics), and laxatives. ? Certain diseases, such as diabetes, cancer, and heart or kidney disease. When should you call for help? Call 911 anytime you think you may need emergency care. For example, call if:    · You passed out (lost consciousness).    Call your doctor now or seek immediate medical care if:    · You are confused and cannot think clearly.     · You are dizzy or lightheaded, or you feel like you may faint.     · You have signs of needing more fluids. You have sunken eyes and a dry mouth, and you pass only a little dark urine.     · You cannot keep fluids down.  Watch closely for changes in your health, and be sure to contact your doctor if:    · You are not making tears.     · Your skin is very dry and sags slowly back into place after you pinch it.     · Your mouth and eyes are very dry. Where can you learn more? Go to http://annabelle-ashlee.info/. Enter L763 in the search box to learn more about \"Dehydration: Care Instructions. \"  Current as of: September 23, 2018  Content Version: 11.9  © 1359-3367 [employer]. Care instructions adapted under license by [employer] (which disclaims liability or warranty for this information). If you have questions about a medical condition or this instruction, always ask your healthcare professional. [address] any warranty or liability for your use of this information. Patient Education        Asthma in Adults: Care Instructions  Your Care Instructions    During an asthma attack, your airways swell and narrow as a reaction to certain things (triggers). This makes it hard to breathe. You may be able to prevent asthma attacks if you avoid the things that set off your asthma symptoms. Keeping your asthma under control and treating symptoms before they get bad can help you avoid severe attacks. If you can control your asthma, you may be able to do all of your normal daily activities. You may also avoid asthma attacks and trips to the hospital.  Follow-up care is a key part of your treatment and safety. Be sure to make and go to all appointments, and call your doctor if you are having problems. It's also a good idea to know your test results and keep a list of the medicines you take. How can you care for yourself at home? · Follow your asthma action plan so you can manage your symptoms at home. An asthma action plan will help you prevent and control airway reactions and will tell you what to do during an asthma attack.  If you do not have an asthma action plan, work with your doctor to build one. · Take your asthma medicine exactly as prescribed. Medicine plays an important role in controlling asthma. Talk to your doctor right away if you have any questions about what to take and how to take it. ? Use your quick-relief medicine when you have symptoms of an attack. Quick-relief medicine often is an albuterol inhaler. Some people need to use quick-relief medicine before they exercise. ? Take your controller medicine every day, not just when you have symptoms. Controller medicine is usually an inhaled corticosteroid. The goal is to prevent problems before they occur. Do not use your controller medicine to try to treat an attack that has already started. It does not work fast enough to help. ? If your doctor prescribed corticosteroid pills to use during an attack, take them as directed. They may take hours to work, but they may shorten the attack and help you breathe better. ? Keep your quick-relief medicine with you at all times. · Talk to your doctor before using other medicines. Some medicines, such as aspirin, can cause asthma attacks in some people. · Check yourself for asthma symptoms to know which step to follow in your action plan. Watch for things like being short of breath, having chest tightness, coughing, and wheezing. Also notice if symptoms wake you up at night or if you get tired quickly when you exercise. · If you have a peak flow meter, use it to check how well you are breathing. This can help you predict when an asthma attack is going to occur. Then you can take medicine to prevent the asthma attack or make it less severe. · See your doctor regularly. These visits will help you learn more about asthma and what you can do to control it. Your doctor will monitor your treatment to make sure the medicine is helping you. · Keep track of your asthma attacks and your treatment.  After you have had an attack, write down what triggered it, what helped end it, and any concerns you have about your asthma action plan. Take your diary when you see your doctor. You can then review your asthma action plan and decide if it is working. · Do not smoke or allow others to smoke around you. Avoid smoky places. Smoking makes asthma worse. If you need help quitting, talk to your doctor about stop-smoking programs and medicines. These can increase your chances of quitting for good. · Learn what triggers an asthma attack for you, and avoid the triggers when you can. Common triggers include colds, smoke, air pollution, dust, pollen, mold, pets, cockroaches, stress, and cold air. · Avoid colds and the flu. Get a pneumococcal vaccine shot. If you have had one before, ask your doctor whether you need a second dose. Get a flu vaccine every fall. If you must be around people with colds or the flu, wash your hands often. When should you call for help? Call 911 anytime you think you may need emergency care. For example, call if:    · You have severe trouble breathing.    Call your doctor now or seek immediate medical care if:    · Your symptoms do not get better after you have followed your asthma action plan.     · You cough up yellow, dark brown, or bloody mucus (sputum).    Watch closely for changes in your health, and be sure to contact your doctor if:    · Your coughing and wheezing get worse.     · You need to use quick-relief medicine on more than 2 days a week (unless it is just for exercise).     · You need help figuring out what is triggering your asthma attacks. Where can you learn more? Go to http://annabelle-ashlee.info/. Enter P597 in the search box to learn more about \"Asthma in Adults: Care Instructions. \"  Current as of: September 5, 2018  Content Version: 11.9  © 0434-6923 [employer]. Care instructions adapted under license by [employer] (which disclaims liability or warranty for this information).  If you have questions about a medical condition or this instruction, always ask your healthcare professional. [place] [address] any warranty or liability for your use of this information.

## 2019-04-08 NOTE — ED PROVIDER NOTES
EMERGENCY DEPARTMENT HISTORY AND PHYSICAL EXAM 
 
 
Date: 4/8/2019 Patient Name: Leanne Colindres History of Presenting Illness Chief Complaint Patient presents with  Lethargy  
  pt ambulatory to triage with slow gait, patient states that he has been SOB, weak, and body aches for the last week, patient has had a productive cough as well PIT/JET NOTE: 
8:49 AM 
Yomaira Gutierrez PA-C has evaluated the patient as the Provider in Triage (PIT) / provider in [facility] for cough, shortness of breath and malaise. The vital signs and the triage nurse assessment have been reviewed. The patient and any available family have been advised that the appropriate studies have been ordered to initiate the work up based on the clinical presentation during the assessment. The pt has been advised that they will be accommodated in Emergency Department as soon as possible. The pt has been requested to contact the triage nurse or PIT/JET immediately if they experiences any changes in their condition during this brief waiting period. History Provided By: Patient and Patient's Son HPI: Leanne Colindres, 80 y.o. male with PMHx significant for CKD, presents ambulatory to the ED with cc of persistent cough and congestion for approximately 5 days. Patient reports a friend at the gym had similar symptoms, however his has resolved. He reports productive cough over the last several days, initially gray sputum which is now turned yellow. He denies any objective fever and chills. States he feels somewhat dry and dehydrated. He denies any overt pain, including chest pain or abdominal pain. He reports nausea, but denies any vomiting or diarrhea. He denies any history of pulmonary disease, any recent foreign travel. There are no other complaints, changes, or physical findings at this time. PCP: Heidi Stafford MD 
 
No current facility-administered medications on file prior to encounter. Current Outpatient Medications on File Prior to Encounter Medication Sig Dispense Refill  lisinopril (PRINIVIL, ZESTRIL) 5 mg tablet TAKE ONE TABLET BY MOUTH DAILY 90 Tab 0  
 levocetirizine (XYZAL) 5 mg tablet TAKE ONE TABLET BY MOUTH EVERY NIGHT AT BEDTIME 90 Tab 0  
 ciprofloxacin HCl (CIPRO) 250 mg tablet Take 1 Tab by mouth two (2) times a day. 6 Tab 0  
 lactulose (CHRONULAC) 10 gram/15 mL solution Take 5 mL by mouth three (3) times daily. 480 mL 1  
 traMADol (ULTRAM) 50 mg tablet Take 1 Tab by mouth three (3) times daily (with meals). Max Daily Amount: 150 mg. 540 Tab 0  
 omeprazole (PRILOSEC) 20 mg capsule Take 1 Cap by mouth two (2) times a day. 180 Cap 3  
 oxyCODONE-acetaminophen (PERCOCET 7.5) 7.5-325 mg per tablet Take 1 Tab by mouth every eight (8) hours as needed for Pain. Max Daily Amount: 3 Tabs. 30 Tab 0  
 Naphazoline-Gly-Benzalk Chl (CLEAR EYES REDNESS RELIEF) 0.012-0.2 % drop Apply 2 Drops to eye daily. Both eyes  alum-mag hydroxide-simeth (MYLANTA) 200-200-20 mg/5 mL susp Take 30 mL by mouth daily as needed.  acetaminophen (TYLENOL) 500 mg tablet Take 1,000 mg by mouth three (3) times daily. Past History Past Medical History: 
Past Medical History:  
Diagnosis Date  Arthritis   
 severe diffuse DJD  Calculus of kidney 1988  
 2 bouts  Cancer [facility])   
 prostate  Chronic kidney disease 2010 urinary obstruction diagnosed  
 due to obstructive uropathy and ? NSAID use long term  GERD (gastroesophageal reflux disease) 6/2009  
 with mild Chin's  Hematuria, microscopic 1970's ?  
 negative cysto and imaging  Other ill-defined conditions(799.89) 2006  
 blood transfusion hx  Perennial allergic rhinitis   
 mold --per Dr. Milly FREITAS (peptic ulcer disease) 2006  
 stomach  Syncope 8/21/2011 Past Surgical History: 
Past Surgical History:  
Procedure Laterality Date  ENDOSCOPY, COLON, DIAGNOSTIC  2006 by Leeroy Ernandez MD; normal  
 HX CATARACT REMOVAL  2014  
 both eyes  HX COLONOSCOPY  10/22/2013  HX ENDOSCOPY Followed by Dr. Kaylee Dockery (multiple)  HX HEENT  1970's  
 septoplasty  HX ORTHOPAEDIC  2000  
 right rotator cuff repair  HX ORTHOPAEDIC    
 right knee total replacement  HX PROSTATECTOMY  2010  
 green light laser by Dr. Ciarra Jaimes  HX UROLOGICAL  13 CYSTOSCOPY WITH OPTICAL INTERNAL URETHROTOMY, AND CRYOABLATION OF PROSTATE Family History: 
Family History Problem Relation Age of Onset  Heart Disease Mother  Stroke Mother  Hypertension Mother  Arthritis-osteo Mother   
     neck and spine; back surgeries x5  Dementia Father Alzheimer's type  Pneumonia Father   
     cause of death  Arthritis-osteo Sister  Other Son Biospy (unsure where)  No Known Problems Daughter Social History: 
Social History Tobacco Use  Smoking status: Former Smoker Packs/day: 2.00 Years: 10.00 Pack years: 20.00 Types: Cigarettes Last attempt to quit: 1961 Years since quittin.2  Smokeless tobacco: Never Used  Tobacco comment: When quit - over 3ppd Substance Use Topics  Alcohol use: No  
  Alcohol/week: 0.0 oz  
  Comment: quit completely in   Drug use: No  
  Types: OTC, Prescription Allergies: 
No Known Allergies Review of Systems Review of Systems Constitutional: Positive for fatigue. Negative for chills and fever. HENT: Positive for congestion. Eyes: Negative for visual disturbance. Respiratory: Positive for cough and shortness of breath. Negative for chest tightness. Cardiovascular: Negative for chest pain and leg swelling. Gastrointestinal: Negative for abdominal pain and vomiting. Endocrine: Negative for polyuria. Genitourinary: Negative for dysuria and frequency. Musculoskeletal: Negative for myalgias. Skin: Negative for color change. Allergic/Immunologic: Negative for immunocompromised state. Neurological: Positive for weakness (Generalized). Negative for numbness. Physical Exam  
Physical Exam  
 
Nursing note and vitals reviewed. General appearance: non-toxic, mildly distressed Eyes: PERRL, EOMI, conjunctiva normal, anicteric sclera HEENT: mucous membranes tacky, oropharynx is clear Pulmonary: Tachypneic, increased work of breathing, scattered expiratory wheeze with prolonged respiratory phase Cardiac: Borderline tachycardia and regular rhythm, no murmurs, gallops, or rubs, 2+DP pulses, 2+ radial pulses Abdomen: soft, nontender, nondistended, bowel sounds present MSK: no pre-tibial edema Neuro: Alert, answers questions appropriately Skin: capillary refill brisk Diagnostic Study Results Labs - Recent Results (from the past 12 hour(s)) EKG, 12 LEAD, INITIAL Collection Time: 04/08/19  8:33 AM  
Result Value Ref Range Ventricular Rate 102 BPM  
 Atrial Rate 102 BPM  
 P-R Interval 138 ms QRS Duration 86 ms  
 Q-T Interval 314 ms QTC Calculation (Bezet) 409 ms Calculated P Axis 60 degrees Calculated R Axis 18 degrees Calculated T Axis 45 degrees Diagnosis Sinus tachycardia with premature atrial complexes When compared with ECG of 20-MAR-2013 10:21, 
premature atrial complexes are now present Vent. rate has increased BY  35 BPM 
  
 
 
Results for Cristela Larsen (MRN [id number]) as of 4/11/2019 12:09 Ref. Range 4/8/2019 08:44 4/8/2019 09:08 4/8/2019 11:39 4/8/2019 13:56 Sodium Latest Ref Range: 136 - 145 mmol/L 140 Potassium Latest Ref Range: 3.5 - 5.1 mmol/L 4.3 Chloride Latest Ref Range: 97 - 108 mmol/L 110 (H) CO2 Latest Ref Range: 21 - 32 mmol/L 22 Anion gap Latest Ref Range: 5 - 15 mmol/L 8 Glucose Latest Ref Range: 65 - 100 mg/dL 99 BUN Latest Ref Range: 6 - 20 MG/DL 36 (H) Creatinine Latest Ref Range: 0.70 - 1.30 MG/DL 1.82 (H) BUN/Creatinine ratio Latest Ref Range: 12 - 20   20 Calcium Latest Ref Range: 8.5 - 10.1 MG/DL 9.0     
GFR est non-AA Latest Ref Range: >60 ml/min/1.73m2 36 (L)     
GFR est AA Latest Ref Range: >60 ml/min/1.73m2 43 (L) Bilirubin, total Latest Ref Range: 0.2 - 1.0 MG/DL 0.8 Protein, total Latest Ref Range: 6.4 - 8.2 g/dL 7.6 Albumin Latest Ref Range: 3.5 - 5.0 g/dL 3.8 Globulin Latest Ref Range: 2.0 - 4.0 g/dL 3.8 A-G Ratio Latest Ref Range: 1.1 - 2.2   1.0 (L) ALT (SGPT) Latest Ref Range: 12 - 78 U/L 27 AST Latest Ref Range: 15 - 37 U/L 23 Alk. phosphatase Latest Ref Range: 45 - 117 U/L 96 Lactic acid Latest Ref Range: 0.4 - 2.0 MMOL/L  3.4 (HH) 2.2 (HH) 3.3 (HH) Results for Rosa Rogerio (MRN [id number]) as of 4/11/2019 12:09 Ref. Range 4/8/2019 10:03 4/8/2019 11:39 D-dimer Latest Ref Range: 0.00 - 0.65 mg/L FEU 0.63 Lactic acid Latest Ref Range: 0.4 - 2.0 MMOL/L  2.2 (HH) Influenza A Antigen Latest Ref Range: NEG    NEGATIVE Influenza B Antigen Latest Ref Range: NEG    NEGATIVE Results for Rosa Roegrio (MRN [id number]) as of 4/11/2019 12:09 Ref. Range 4/8/2019 08:44 WBC Latest Ref Range: 4.1 - 11.1 K/uL 6.5 NRBC Latest Ref Range: 0  WBC 0.0  
RBC Latest Ref Range: 4.10 - 5.70 M/uL 4.24 HGB Latest Ref Range: 12.1 - 17.0 g/dL 13.7 HCT Latest Ref Range: 36.6 - 50.3 % 41.0 MCV Latest Ref Range: 80.0 - 99.0 FL 96.7 MCH Latest Ref Range: 26.0 - 34.0 PG 32.3 MCHC Latest Ref Range: 30.0 - 36.5 g/dL 33.4 RDW Latest Ref Range: 11.5 - 14.5 % 12.4 PLATELET Latest Ref Range: 150 - 400 K/uL 168 MPV Latest Ref Range: 8.9 - 12.9 FL 10.3 NEUTROPHILS Latest Ref Range: 32 - 75 % 57 LYMPHOCYTES Latest Ref Range: 12 - 49 % 34 MONOCYTES Latest Ref Range: 5 - 13 % 7 EOSINOPHILS Latest Ref Range: 0 - 7 % 2  
BASOPHILS Latest Ref Range: 0 - 1 % 0 IMMATURE GRANULOCYTES Latest Ref Range: 0.0 - 0.5 % 0  
 DF Latest Units:   AUTOMATED ABSOLUTE NRBC Latest Ref Range: 0.00 - 0.01 K/uL 0.00  
ABS. NEUTROPHILS Latest Ref Range: 1.8 - 8.0 K/UL 3.8  
ABS. IMM. GRANS. Latest Ref Range: 0.00 - 0.04 K/UL 0.0  
ABS. LYMPHOCYTES Latest Ref Range: 0.8 - 3.5 K/UL 2.2  
ABS. MONOCYTES Latest Ref Range: 0.0 - 1.0 K/UL 0.4  
ABS. EOSINOPHILS Latest Ref Range: 0.0 - 0.4 K/UL 0.1 ABS. BASOPHILS Latest Ref Range: 0.0 - 0.1 K/UL 0.0 Radiologic Studies -  
XR CHEST PA LAT    (Results Pending) CT Results  (Last 48 hours) None EXAM: Chest CT 
CT dose reduction was achieved through use of a standardized protocol tailored 
for this examination and automatic exposure control for dose modulation. 
  
Contrast: None. 
  
COMPARISON: None. 
  
FINDINGS: Lungs show no acute infiltrate or edema. There are pulmonary calcified 
granulomas and several punctate probable scars. There is granulomatous calcified 
adenopathy. 
  
There is no pleural or pericardial effusion. Aorta is slightly calcified, 
without aneurysm. There is coronary artery calcification. 
  
Visualized abdomen shows the adrenals are not enlarged. Left kidney is atrophic. Visualized thyroid and lower neck soft tissues are unremarkable for age. 
  
IMPRESSION IMPRESSION: No acute finding. CXR Results  (Last 48 hours) None Exam: PA and lateral views of the chest. 
  
Direct comparison is made to prior CXR dated August 2011. 
  
Findings: Cardiomediastinal silhouette is within normal limits. Lungs are clear 
bilaterally. Pleural spaces are normal. Osseous structures are intact. 
  
IMPRESSION IMPRESSION: No acute cardiopulmonary disease. Medical Decision Making I am the first provider for this patient. I reviewed the vital signs, available nursing notes, past medical history, past surgical history, family history and social history. Vital Signs-Reviewed the patient's vital signs. Patient Vitals for the past 12 hrs: Temp Pulse Resp BP SpO2  
04/08/19 0829 98.4 °F (36.9 °C) (!) 115 20 138/87 99 % Pulse Oximetry Analysis - 99% on room air. Cardiac Monitor:  
Rate: 102 bpm 
Rhythm: Sinus Tachycardia EKG interpretation: (Preliminary) 8:33 AM 
Sinus tachycardia, normal axis, ventricular rate 102, , QRS 86, QTc 409, no ST elevation or ST depression Records Reviewed: Nursing Notes and Old Medical Records Provider Notes (Medical Decision Making):  
77-year-old male presents with cough congestion shortness of breath. Somewhat tachypneic on exam.  No apparent risk factors for PE or DVT. Given clinical context, consider viral syndrome, bronchitis, pneumonia. Does not appear volume overloaded. No pain to suggest ACS. Will check labs, x-ray, nebulizer treatment, and reassess. Also appears slightly dry, will give IV fluids. ED Course:  
Initial assessment performed. The patients presenting problems have been discussed, and they are in agreement with the care plan formulated and outlined with them. I have encouraged them to ask questions as they arise throughout their visit. ED Course as of Apr 11 1208 Mon Apr 08, 2019  
1143 Patient in relating with O2 sat %, heart rate . [FH] 1309 Patient with episode of coughing after second nebulizer treatment. Breath sounds are improving. Suspect mobilization of secretions with bronchodilation. Will cover with prednisone given the clinical context, as well as doxycycline for potential bacterial bronchitis. [FH] 1511 Discussed with patient repeat lactic acid, which increased to 3.3. I discussed with him that I had expected this value to continue to improve. He may still be somewhat dry. Transient tachypnea associated with neb treatments and coughing may have also exacerbated his lactic acid level.   I did relay to him my concern for a rise in lactic acid, and offered him hospital observation for continuous IV fluids and a period of monitoring. He declines this option, and prefers to go home and agrees to drink Gatorade and water and follow-up with his PCP. I advised him to return to the ED if he feels at all worse or no improvement. He verbalized understanding of lab findings, my concerns at this point, and agrees to follow-up with his PCP and come back if feeling worse. Patient clearly is capacitated. [FH] ED Course User Index [FH] Zhao Ann MD  
 
 
Critical Care Time:  
0 Disposition: 
Discharge Note: The patient has been re-evaluated and is ready for discharge. Reviewed available results with patient. Counseled patient on diagnosis and care plan. Patient has expressed understanding, and all questions have been answered. Patient agrees with plan and agrees to follow up as recommended, or to return to the ED if their symptoms worsen. Discharge instructions have been provided and explained to the patient, along with reasons to return to the ED. PLAN: 
1. Discharge Medication List as of 4/8/2019  3:16 PM  
  
START taking these medications Details  
predniSONE (DELTASONE) 20 mg tablet Take 60 mg by mouth daily for 4 days. , Normal, Disp-12 Tab, R-0  
  
albuterol sulfate 90 mcg/actuation aepb Take 2 Puffs by inhalation every four (4) hours as needed for Cough (or wheeze). Indications: wheeze or cough; please dispense with chamber/spacer, Print, Disp-1 Inhaler, R-0  
  
doxycycline (VIBRA-TABS) 100 mg tablet Take 1 Tab by mouth two (2) times a day for 7 days. , Normal, Disp-14 Tab, R-0  
  
benzonatate (TESSALON PERLES) 100 mg capsule Take 1 Cap by mouth three (3) times daily as needed for Cough for up to 7 days. , Normal, Disp-30 Cap, R-0  
  
cefdinir (OMNICEF) 300 mg capsule Take 1 Cap by mouth two (2) times a day for 7 days.  Indications: Bronchitis caused by the Bacteria Moraxella Catarrhalis, Normal, Disp-14 Cap, R-0  
  
  
 CONTINUE these medications which have NOT CHANGED Details  
lisinopril (PRINIVIL, ZESTRIL) 5 mg tablet TAKE ONE TABLET BY MOUTH DAILY, Normal, Disp-90 Tab, R-0  
  
levocetirizine (XYZAL) 5 mg tablet TAKE ONE TABLET BY MOUTH EVERY NIGHT AT BEDTIME, Normal, Disp-90 Tab, R-0  
  
traMADol (ULTRAM) 50 mg tablet Take 1 Tab by mouth three (3) times daily (with meals). Max Daily Amount: 150 mg., Print, Disp-540 Tab, R-0  
  
  
 
2. Follow-up Information Follow up With Specialties Details Why Contact Info Claire Sanchez MD Internal Medicine Schedule an appointment as soon as possible for a visit in 3 days  Audra Giron [address] [facility] [address] [facility] 
[phone] [facility] EMERGENCY DEPT Emergency Medicine Go in 1 day If symptoms worsen [address] [facility] 
[phone] Return to ED if worse Diagnosis Clinical Impression: 1. Acute bronchitis, unspecified organism 2. Shortness of breath 3. Dehydration Attestations:

## 2019-04-08 NOTE — ED NOTES
Pt arrives to the ED c/o of fatigue x1 week. Productive cough with yellow phlegm. Pt reports SOB with exertion and slight wheezing present in the bases. Pt denies abdominal pain. Pt denies any recent falls and ambulates independently at baseline.

## 2019-04-08 NOTE — ED NOTES
Spoke with Dr. Barbara Borges regarding patients repeat elevated lactic acid. Verbal orders to hang 1L fluid and repeat lab when fluids complete.

## 2019-04-08 NOTE — ED NOTES
Patient states he feels a little nauseous as well, but that has been after eating breakfast this morning. Patient received a flu shot and Pneumonia shot this year.

## 2019-04-08 NOTE — ED NOTES
Dr. Yousif Fagan has reviewed discharge instructions with the patient. The patient verbalized understanding. Ambulated to exit, home with son.

## 2019-04-10 LAB
BACTERIA SPEC CULT: ABNORMAL
CC UR VC: ABNORMAL
SERVICE CMNT-IMP: ABNORMAL

## 2019-05-16 DIAGNOSIS — R73.01 IFG (IMPAIRED FASTING GLUCOSE): ICD-10-CM

## 2019-05-16 DIAGNOSIS — N18.30 CHRONIC KIDNEY DISEASE, STAGE III (MODERATE) (HCC): ICD-10-CM

## 2019-05-16 DIAGNOSIS — E78.5 DYSLIPIDEMIA: ICD-10-CM

## 2019-05-16 DIAGNOSIS — E55.9 VITAMIN D DEFICIENCY: ICD-10-CM

## 2019-05-16 DIAGNOSIS — G89.4 CHRONIC PAIN SYNDROME: ICD-10-CM

## 2019-05-16 RX ORDER — LISINOPRIL 5 MG/1
TABLET ORAL
Qty: 90 TAB | Refills: 0 | Status: SHIPPED | OUTPATIENT
Start: 2019-05-16 | End: 2019-08-16 | Stop reason: SDUPTHER

## 2019-05-16 RX ORDER — TRAMADOL HYDROCHLORIDE 50 MG/1
TABLET ORAL
Qty: 540 TAB | Refills: 0 | Status: SHIPPED | OUTPATIENT
Start: 2019-05-16 | End: 2019-06-15

## 2019-05-20 ENCOUNTER — TELEPHONE (OUTPATIENT)
Dept: INTERNAL MEDICINE CLINIC | Age: 84
End: 2019-05-20

## 2019-05-20 NOTE — TELEPHONE ENCOUNTER
The following prescription was called into pt's local pharmacy:     traMADol (ULTRAM) 50 mg tablet [[id number]]     Order Details   Dose, Route, Frequency: As Directed    Dispense Quantity: 540 Tab Refills: 0 Fills remaining: --           Sig: TAKE TWO TABLETS BY MOUTH THREE TIMES A DAY          Written Date: 05/16/19 Expiration Date: --     Start Date: 05/16/19 End Date: 06/15/19            Ordering Provider:  -- CORDELIA #:  [id number] NPI:  --    Authorizing Provider:  Lorin Cuellar MD CORDELIA #:  [id number] NPI:  [id number]    Ordering User:  Lorin Cuellar MD            Diagnosis Association: Chronic pain syndrome (G89.4)      Original Order:  traMADol Margart Aspen) 50 mg tablet [[id number]]      Pharmacy:  [facility] [address], [address] [place] [address]  #:  [id number]     Pharmacy Comments:  --          Fill quantity remaining:  -- Fill quantity used:  --

## 2019-05-20 NOTE — TELEPHONE ENCOUNTER
Suzi needs a call to get the status of refill requests for patient's Tramadol. Please call.  Thank you

## 2019-06-13 ENCOUNTER — OFFICE VISIT (OUTPATIENT)
Dept: INTERNAL MEDICINE CLINIC | Age: 84
End: 2019-06-13

## 2019-06-13 VITALS
SYSTOLIC BLOOD PRESSURE: 132 MMHG | BODY MASS INDEX: 24.05 KG/M2 | HEART RATE: 79 BPM | RESPIRATION RATE: 16 BRPM | WEIGHT: 168 LBS | OXYGEN SATURATION: 94 % | DIASTOLIC BLOOD PRESSURE: 66 MMHG | HEIGHT: 70 IN | TEMPERATURE: 97.2 F

## 2019-06-13 DIAGNOSIS — M15.9 OSTEOARTHRITIS OF MULTIPLE JOINTS, UNSPECIFIED OSTEOARTHRITIS TYPE: ICD-10-CM

## 2019-06-13 DIAGNOSIS — R19.7 DIARRHEA, UNSPECIFIED TYPE: ICD-10-CM

## 2019-06-13 DIAGNOSIS — N18.30 CHRONIC KIDNEY DISEASE, STAGE III (MODERATE) (HCC): Primary | ICD-10-CM

## 2019-06-13 DIAGNOSIS — Z79.891 ADMISSION FOR LONG-TERM OPIATE ANALGESIC USE: ICD-10-CM

## 2019-06-13 DIAGNOSIS — G89.4 CHRONIC PAIN SYNDROME: ICD-10-CM

## 2019-06-13 DIAGNOSIS — J30.89 PERENNIAL ALLERGIC RHINITIS: ICD-10-CM

## 2019-06-13 DIAGNOSIS — K21.9 GASTROESOPHAGEAL REFLUX DISEASE WITHOUT ESOPHAGITIS: ICD-10-CM

## 2019-06-13 DIAGNOSIS — E55.9 VITAMIN D DEFICIENCY: ICD-10-CM

## 2019-06-13 DIAGNOSIS — C61 PROSTATE CANCER (HCC): ICD-10-CM

## 2019-06-13 RX ORDER — OXYCODONE AND ACETAMINOPHEN 7.5; 325 MG/1; MG/1
1 TABLET ORAL
Qty: 20 TAB | Refills: 0 | Status: SHIPPED | OUTPATIENT
Start: 2019-06-13 | End: 2019-06-16

## 2019-06-13 RX ORDER — LEVOCETIRIZINE DIHYDROCHLORIDE 5 MG/1
TABLET, FILM COATED ORAL
Qty: 90 TAB | Refills: 1 | Status: SHIPPED | OUTPATIENT
Start: 2019-06-13 | End: 2019-12-20 | Stop reason: SDUPTHER

## 2019-06-13 NOTE — LETTER
Name:Anup Camejo JJT:[birth year] MR #:[id number] 2 Priya Carlisle Page 1 of 5 CONTROLLED SUBSTANCE AGREEMENT I may be prescribed medications that are controlled substances as part  of my treatment plan for management of my medical condition(s). The goal of my treatment plan is to maintain and/or improve my health and wellbeing. Because controlled substances have an increased risk of abuse or harm, continual re-evaluation is needed determine if the goals of my treatment plan are being met for my safety and the safety of others. Carlos Thomason  am entering into this Controlled Substance Agreement with my provider, __________________________________ at [facility] . I understand that successful treatment requires mutual trust and honesty between me and my provider. I understand that there are state and federal laws and regulations which apply to the medications that my provider may prescribe that must be followed. I understand there are risks and benefits ts of taking the medicines that my provider may prescribe. I understand and agree that following this Agreement is necessary in continuing my provider-patient relationship and success of my treatment plan. As a part of my treatment plan, I agree to the following: COMMUNICATION: 
 
1. I will communicate fully with my provider about my medical condition(s), including the effect on my daily life and how well my medications are helping. I will tell my provider all of the medications that I take for any reason, including medications I receive from another health care provider, and will notify my provider about all issues, problems or concerns, including any side effects, which may be related to my medications. I understand that this information allows my provider to adjust my treatment plan to help manage my medical condition.  I understand that this information will become part of my permanent medical record. 2. I will notify my provider if I have a history of alcohol/drug misuse/addiction or if I have had treatment for alcohol/drug addiction in the past, or if I have a new problem with or concern about alcohol/drug use/addiction, because this increases the likelihood of high risk behaviors and may lead to serious medical conditions. 3. Females Only: I will notify my provider if I am or become pregnant, or if I intend to become pregnant, or if I intend to breastfeed. I understand that communication of these issues with my provider is important, due to possible effects my medication could have on an unborn fetus or breastfeeding child. Initials_____ Name:Anup Nielsen JFO:[birth year] MR #:[id number] 2 Priya Carlisle Page 2 of 5 MISUSE OF MEDICATIONS / DRUGS: 
 
1. I agree to take all controlled substances as prescribed, and will not misuse or abuse any controlled substances prescribed by my provider. For my safety, I will not increase the amount of medicine I take without first talking with and getting permission from my provider. 2. If I have a medical emergency, another health care provider may prescribe me medication. If I seek emergency treatment, I will notify my provider within seventy-two (72) hours. 3. I understand that my provider may discuss my use and/or possible misuse/abuse of controlled substances and alcohol, as appropriate, with any health care provider involved in my care, pharmacist or legal authority. ILLEGAL DRUGS: 
 
1. I will not use illegal drugs of any kind, including but not limited to marijuana, heroin, cocaine, or any prescription drug which is not prescribed to me. DRUG DIVERSION / PRESCRIPTION FRAUD: 
 
1. I will not share, sell, trade, give away, or otherwise misuse my prescriptions or medications. 2. I will not alter any prescriptions provided to me by my provider. SINGLE PROVIDER: 
 
1. I agree that all controlled substances that I take will be prescribed only by my provider (or his/her covering provider) under this Agreement. This agreement does not prevent me from seeking emergency medical treatment or receiving pain management related to a surgery. PROTECTING MEDICATIONS: 
 
1. I am responsible for keeping my prescriptions and medications in a safe and secure place including safeguarding them from loss or theft. I understand that lost, stolen or damaged/destroyed prescriptions or medications will not be replaced. Initials____ Name:.Regan Guzman [facility]:[birth year] MR #:[id number] 2 Priya Carlisle Page 3 of 5 PRESCRIPTION RENEWALS/REFILLS: 
 
1. I will follow my controlled substance medication schedule as prescribed by my provider. 2. I understand and agree that I will make any requests for renewals or refills of my prescriptions only at the time of an office visit or during my providers regular office hours subject to the prescription refill requirements of the individual practice. 3. I understand that my provider may not call in prescriptions for controlled substances to my pharmacy. 4. I understand that my provider may adjust or discontinue these medications as deemed appropriate for my medical treatment plan. This Agreement does not guarantee the prescription of controlled medications. 5. I agree that if my medications are adjusted or discontinued, I will properly dispose of any remaining medications. I understand that I will be required to dispose of any remaining controlled medications prior to being provided with any prescriptions for other controlled medications. 6. I understand that the renewal of my prescription depends on my medical condition, my consistent participation, and my adherence with my treatment plan and this Agreement.  
 
7. I understand that if I do not keep an appointment with my provider, I may not receive a renewal or refill for my controlled substance medication. PRESCRIPTION MONITORING / DRUG TESTIN. I understand that my provider may require me to provide urine, saliva or blood for testing at any time. I understand that this testing will be used to monitor for safety and adherence with my treatment plan and this Agreement. 2. I understand that my provider may ask me to provide an observed urine specimen, which means that a nurse or other health care provider may watch me provide urine, and I agree to cooperate if I am asked to provide an observed specimen. 3. I understand that if I do not provide urine, saliva or blood samples within two (2) hours of my providers request, or other timeframe decided by my provider, my treatment plan could be changed, or my prescriptions and medications may be changed or ended. 4. I understand that urine, saliva and blood test results will be a part of my permanent medical record. Initials_____ Name:Anup Nava GTA: MR #:[id number] 2 Priya Carlisle Page 4 of 5 
 
5. I understand that my provider is required to obtain a copy of my State Prescription Monitoring Program () Report at any time in order to safely prescribe medications. 6. I will bring all of my prescribed controlled substance medications in their original bottles to all of my scheduled appointments. 7. I understand that my provider may ask me to come to the practice with all of my prescribed medications for a random pill count at any time. I agree to cooperate if I am asked to come in for a random pill count. I understand that if I do not arrive in the timeframe decided by my provider, my treatment plan could be changed, or my prescriptions and medications may be changed or ended.  
 
COOPERATION WITH INVESTIGATIONS: 
 
1. I authorize my provider and my pharmacy to cooperate fully with any local, state, or federal law enforcement agency in the investigation of any possible misuse, sale, or other diversion of my controlled substance prescriptions or medications. RISKS: 
 
1. I understand that my level of consciousness may be affected from the use of controlled substances, and I understand that there are risks, benefits, effects and potential alternatives (including no treatment) to the medications that my provider has prescribed. 2. I understand that I may become drowsy, tired, dizzy, constipated, and sick to my stomach, or have changes in my mood or in my sleep while taking my medications. I have talked with my provider about these possible side effects, risks, benefits, and alternative treatments, and my provider has answered all of my questions. 3. I understand that I should not suddenly stop taking my medications without first speaking with my provider. I understand that if I suddenly stop taking my medications, I may experience nausea, vomiting, sweating,anxiety, sleeplessness, itching or other uncomfortable feelings. 4. I will not take my medications with alcohol of any kind, including beer, wine or liquor. I understand that drinking alcohol with my medications increases the chances of side effects, including breathing problems or even death. 5. I understand that if I have a history of alcoholism or other drug addiction I may be at increased risk of addiction to my medications. Signs of addiction might include craving, compulsive use, and continued use despite harm. Since addiction is a disease, I understand my provider may decide to change my medications and refer me to appropriate treatment services. I understand that this information would become part of my permanent medical record. Initials_____ Name:.Regan Guzman [facility]:[birth year] MR #:[id number] 2 Priya Carlisle Page 5 of 5  
 
 
6.  Females only: Children born to women who regularly take controlled substances are likely to have physical problems and suffer withdrawal symptoms at birth. If I am of child-bearing age, I understand that I should use safe and effective birth control while taking any controlled substances to avoid the impact of medications on an unborn fetus or  child. I agree to notify my provider immediately if I should become pregnant so that my treatment plan can be adjusted. 7. Males only: I understand that chronic use of controlled substances has been associated with low testosterone levels in males which may affect my mood, stamina, sexual desire, and general health. I understand that my provider may order the appropriate laboratory test to determine my testosterone level,and I agree to this testing. ADHERENCE: 
 
1. I understand that if I do not adhere to this Agreement in any way, my provider may change my prescriptions, stop prescribing controlled substances or end our provider-patient relationship. 2. If my provider decides to stop prescribing medication, or decides to end our provider-patient relationship,my provider may require that I taper my medications slowly. If necessary, my provider may also provide a prescription for other medications to treat my withdrawal symptoms. UNDERSTANDING THIS AGREEMENT: 
 
I understand that my provider may adjust or stop my prescriptions for controlled substances based on my medical condition and my treatment plan. I understand that this Agreement does not guarantee that I will be prescribed medications or controlled substances. I understand that controlled substances may be just one part 
of my treatment plan. My initial on each page and my signature below shows that I have read each page of this Agreement, I have had an opportunity to ask questions, and all of my questions have been answered to my satisfaction by my provider.  
 
By signing below, I agree to comply with this Agreement, and I understand that if I do not follow the Agreements listed above, my provider may stop 
 
_________________________________________  Date/Time 6/13/2019 1:37 PM   
             (Patient Signature) 
 
________________________________________    Date/Time 6/13/2019 1:37 PM 
 (Parent or Guardian Signature if <18 yrs) 
 
_________________________________________ Date/Time 6/13/2019 1:37 PM 
 (Provider Signature)

## 2019-06-13 NOTE — PROGRESS NOTES
HISTORY OF PRESENT ILLNESS  Gissel Bledsoe is a 80 y.o. male. HPI  Last here 12/18/18. Pt is here to f/u on chronic conditions. Pt ambulates with a cane. Discussed that with tramadol rx he should come in q3 months      BP is 132/66  BP at home has been around 125/68  Continues on lisinopril 5mg daily to protect his kidneys     Wt today is 168 lbs --stable x lov   Discussed diet and w/l      Reviewed labs 12/18      Previously, I referred the pt to Dr. Mignon Gallahger (rheum)  However, he did not schedule an appt with this rheum  Not intertested in rheum at this time     Pt follows with Dr. Marya Shone (uro) for prostate cancer  Pt's PSA in 12/18 was 0.2  Pt reports calling since lov and being told that this can happen if you have a UTI  Discussed biochemical recurrence  Pt would like to let this go and not pursue it any further however  Recall last note 3/30/16:  PSA undetectable, pt doing well no further UTI. Recall pt's prostate is removed    Advised him to take vit D OTC 1000U daily       Pt has been taking prilosec prn (infrequently), as his reflux has not been an issue  He takes this if eats a trigger food  Pt had an EGD cancelled d/t lack of sx doing better     Continues on xyzol qhs for allergies - works well  Happy with this  Recall pt is allergic to fungus and pet dander     Pt uses 1 dose of lactulose prn 10ml  which helps with BMs  Now with regular bm      Pt continues on tramadol with meals for his back pain (6 tabs daily)  He tried cutting back on this but was unable to  Pt gets 540 tabs tramadol q3 months  Pt knows not to give this to anyone else  Pt also has percocet to use prn (rarely - took last month, bottle lasted >2 years) for more severe back pain  Pain contract signed on 06/13/19   Urine drug screen complete 12/18, ordered 06/13/19      Pt was in the ER in 4/19 for SOB and cough  Reviewed labs 4/19: Proteus UTI  Reviewed CT chest 4/19: No acute finding.   Pt states it took a while for him to recover from this  His sx have resolved however     ACP not on file. SDM is his son Caryle Canal).     Recall the patient has OA in his hands which bothers him the most, also some in the neck. He continues to take tramadol for this 6 tabs per day. Gets 540 tabs for 3 months. --i prescribe this now, we addressed this last time and he stated he still gets pain and does not think the tramadol does anything but takes it for maintenance anyway       Recall he has a dx of prostate cancer since January 2013 , sees calin annually and last saw him in January 2015--states he was released from care at that time.    Recall the patient has h/o ckd, last cr 1.5 continues to be stable. , prior was 1.77, uns 1.5-1.77  Recall that he Used to follow with rheumatologist dr Tara Ruano years ago --currently not seeing anyone. Recall the patient has hansens and a h/o stomach ulcer, sees ry for this.  Sx controlled with prilosec bid , he states he saw him last year or the year before, sx well controlled, only one or 2 breakthrough episodes.        PREVENTIVE:    Colonoscopy: 10/22/13, Dr. Jose C Pardo, repeat 10 years  EGD: 4/16/16, Dr Jose C Pardo, repeat 3 years  PSA: 1/13 dx'd with prostate cancer, 3/16, 12/17 0.1, 6/18 0.1, 12/18 0.2, followed by Dr. Roslyn Meléndez  AAA screen: 10/10, negative  Tdap: declines    Pneumovax 10/10    Prevnar 13: 6/14/17  Shingrix: ordered 06/13/18 $$ not getting   Flu shot: Fall 2018  Eye: Dr. Bruna Escalera  A1c: 6/16 5.9, 12/16 5.7  Pain contract: signed 6/14/17  Urine drug screen: 12/18, c/w tramadol, ordered 06/13/19      Patient Active Problem List    Diagnosis Date Noted    Primary osteoarthritis of both hands 06/13/2018    ACP (advance care planning) 12/11/2015    Prostate cancer ([facility] [address] 02/18/2013    Syncope 08/21/2011    Chronic kidney disease, stage III (moderate) ([facility] [address] 06/28/2011    Vitamin D deficiency 06/27/2011    DJD (degenerative joint disease), multiple sites 03/09/2010    GERD (gastroesophageal reflux disease) 03/09/2010    History of peptic ulcer disease 03/09/2010    BPH (benign prostatic hypertrophy) 03/09/2010    Perennial allergic rhinitis 03/09/2010     Current Outpatient Medications   Medication Sig Dispense Refill    traMADol (ULTRAM) 50 mg tablet TAKE TWO TABLETS BY MOUTH THREE TIMES A  Tab 0    lisinopril (PRINIVIL, ZESTRIL) 5 mg tablet TAKE ONE TABLET BY MOUTH DAILY 90 Tab 0    albuterol sulfate 90 mcg/actuation aepb Take 2 Puffs by inhalation every four (4) hours as needed for Cough (or wheeze).  Indications: wheeze or cough; please dispense with chamber/spacer 1 Inhaler 0    levocetirizine (XYZAL) 5 mg tablet TAKE ONE TABLET BY MOUTH EVERY NIGHT AT BEDTIME 90 Tab 0     Past Surgical History:   Procedure Laterality Date    ENDOSCOPY, COLON, DIAGNOSTIC  2006    by [facility]; normal    HX CATARACT REMOVAL  12/28/2014    both eyes    HX COLONOSCOPY  10/22/2013    HX ENDOSCOPY      Followed by Dr. Archana Carvalho (multiple)    HX HEENT  1970's    septoplasty    HX ORTHOPAEDIC  2000    right rotator cuff repair    HX ORTHOPAEDIC      right knee total replacement    HX PROSTATECTOMY  2010    green light laser by Dr. Resendiz Spine HX UROLOGICAL  5/7/13    CYSTOSCOPY WITH OPTICAL INTERNAL URETHROTOMY, AND CRYOABLATION OF PROSTATE      Lab Results   Component Value Date/Time    WBC 6.5 04/08/2019 08:44 AM    HGB 13.7 04/08/2019 08:44 AM    HCT 41.0 04/08/2019 08:44 AM    PLATELET 184 04/68/5842 08:44 AM    MCV 96.7 04/08/2019 08:44 AM     Lab Results   Component Value Date/Time    Cholesterol, total 164 12/21/2018 10:43 AM    HDL Cholesterol 44 12/21/2018 10:43 AM    LDL, calculated 99 12/21/2018 10:43 AM    Triglyceride 105 12/21/2018 10:43 AM    CHOL/HDL Ratio 3.6 06/25/2010 08:36 AM     Lab Results   Component Value Date/Time    GFR est non-AA 36 (L) 04/08/2019 08:44 AM    GFR est AA 43 (L) 04/08/2019 08:44 AM    Creatinine 1.82 (H) 04/08/2019 08:44 AM    BUN 36 (H) 04/08/2019 08:44 AM    Sodium 140 04/08/2019 08:44 AM    Potassium 4.3 04/08/2019 08:44 AM    Chloride 110 (H) 04/08/2019 08:44 AM    CO2 22 04/08/2019 08:44 AM        Review of Systems   Respiratory: Negative for shortness of breath. Cardiovascular: Negative for chest pain. Physical Exam   Constitutional: He is oriented to person, place, and time. He appears well-developed and well-nourished. No distress. HENT:   Head: Normocephalic and atraumatic. Mouth/Throat: Oropharynx is clear and moist. No oropharyngeal exudate. Eyes: Conjunctivae and EOM are normal. Right eye exhibits no discharge. Left eye exhibits no discharge. Neck: Normal range of motion. Neck supple. Cardiovascular: Normal rate, regular rhythm, normal heart sounds and intact distal pulses. Exam reveals no gallop and no friction rub. No murmur heard. Pulmonary/Chest: Effort normal and breath sounds normal. No respiratory distress. He has no wheezes. He has no rales. He exhibits no tenderness. Musculoskeletal: Normal range of motion. He exhibits no edema, tenderness or deformity. Lymphadenopathy:     He has no cervical adenopathy. Neurological: He is alert and oriented to person, place, and time. He has normal reflexes. Coordination normal.   Skin: Skin is warm and dry. No rash noted. He is not diaphoretic. No erythema. No pallor. Psychiatric: He has a normal mood and affect. His behavior is normal.       ASSESSMENT and PLAN    ICD-10-CM ICD-9-CM    1. Chronic kidney disease, stage III (moderate) ([facility])    Cr runs baseline ~1.6-1.8, was stable when I last checked it, will repeat today, does not see nephro, will only go for deteriorated function   N18.3 585.3    2.  Prostate cancer [facility])    Last PSA was 0.2 which indicates biochemical recurrence, we discussed need to f/u with Dr Ryan Toscano at [facility] and he was called as well, pt has yet to schedule f/u    After a long discussion, pt does not want any further evaluation and is aware that this could mean recurrent prostate cancer   C61 185    3. Gastroesophageal reflux disease without esophagitis    Controlled with prilosec prn   K21.9 530.81    4. Osteoarthritis of multiple joints, unspecified osteoarthritis type    Chronic issue, is on tramadol for this from me, gets 540 tablets, gets 6 tabs per day, this works well, infrequently takes a percocet for uncontrolled pain, redid urine drug screen and pain contract today   M15.9 715.89    5. Vitamin D deficiency    Check level and treat prn   E55.9 268.9     6. Diarrhea--improved with immodium  7 bronchitis--resolved reviewed imaging  8 allergies --xyzol works well rf today   Depression screen reviewed and negative. Scribed by Gladys Larsen of [facility], as dictated by Dr. Estrella Galvez. Current diagnosis and concerns discussed with pt at length. Pt understands risks and benefits or current treatment plan and medications, and accepts the treatment and medication with any possible risks. Pt asks appropriate questions, which were answered. Pt was instructed to call with any concerns or problems. I have reviewed the note documented by the scribe. The services provided are my own.   The documentation is accurate

## 2019-06-13 NOTE — PATIENT INSTRUCTIONS
Office Policies Phone calls/patient messages: Please allow up to 24 hours for someone in the office to contact you about your call or message. Be mindful your provider may be out of the office or your message may require further review. We encourage you to use [employer] for your messages as this is a faster, more efficient way to communicate with our office Medication Refills: 
         
Prescription medications require 48-72 business hours to process. We encourage you to use [employer] for your refills. For controlled medications: Please allow 72 business hours to process. Certain medications may require you to  a written prescription at our office. NO narcotic/controlled medications will be prescribed after 4pm Monday through Friday or on weekends Form/Paperwork Completion: 
         
Please note a $25 fee may incur for all paperwork for completed by our providers. We ask that you allow 7-10 business days. Pre-payment is due prior to picking up/faxing the completed form. You may also download your forms to [employer] to have your doctor print off.

## 2019-06-15 LAB
25(OH)D3+25(OH)D2 SERPL-MCNC: 28 NG/ML (ref 30–100)
BUN SERPL-MCNC: 32 MG/DL (ref 8–27)
BUN/CREAT SERPL: 19 (ref 10–24)
CALCIUM SERPL-MCNC: 9.8 MG/DL (ref 8.6–10.2)
CHLORIDE SERPL-SCNC: 104 MMOL/L (ref 96–106)
CO2 SERPL-SCNC: 22 MMOL/L (ref 20–29)
CREAT SERPL-MCNC: 1.69 MG/DL (ref 0.76–1.27)
GLUCOSE SERPL-MCNC: 96 MG/DL (ref 65–99)
POTASSIUM SERPL-SCNC: 5.5 MMOL/L (ref 3.5–5.2)
SODIUM SERPL-SCNC: 141 MMOL/L (ref 134–144)

## 2019-06-18 DIAGNOSIS — E87.5 HYPERKALEMIA: Primary | ICD-10-CM

## 2019-06-18 NOTE — PROGRESS NOTES
Called, spoke to pt. Two pt identifiers confirmed. Pt informed per Dr. Soo Rodriguez mild K+ elevation. Pt informed per Dr. Soo Rodriguez repeat bmp in 2 weeks. Labs ordered and mailed to pt. Pt informed per Dr. Soo Rodriguez creatinine stable. Pt informed per Dr. Soo Rodriguez vit d mild low--increase otc vit d to 2000units per day. Pt verbalized understanding of information discussed w/ no further questions at this time.

## 2019-06-19 LAB — DRUGS UR: NORMAL

## 2019-06-27 DIAGNOSIS — E87.5 HYPERKALEMIA: ICD-10-CM

## 2019-06-28 LAB
BUN SERPL-MCNC: 38 MG/DL (ref 8–27)
BUN/CREAT SERPL: 23 (ref 10–24)
CALCIUM SERPL-MCNC: 9.3 MG/DL (ref 8.6–10.2)
CHLORIDE SERPL-SCNC: 106 MMOL/L (ref 96–106)
CO2 SERPL-SCNC: 22 MMOL/L (ref 20–29)
CREAT SERPL-MCNC: 1.63 MG/DL (ref 0.76–1.27)
GLUCOSE SERPL-MCNC: 93 MG/DL (ref 65–99)
POTASSIUM SERPL-SCNC: 4.9 MMOL/L (ref 3.5–5.2)
SODIUM SERPL-SCNC: 141 MMOL/L (ref 134–144)

## 2019-08-16 DIAGNOSIS — R73.01 IFG (IMPAIRED FASTING GLUCOSE): ICD-10-CM

## 2019-08-16 DIAGNOSIS — E55.9 VITAMIN D DEFICIENCY: ICD-10-CM

## 2019-08-16 DIAGNOSIS — N18.30 CHRONIC KIDNEY DISEASE, STAGE III (MODERATE) (HCC): ICD-10-CM

## 2019-08-16 DIAGNOSIS — E78.5 DYSLIPIDEMIA: ICD-10-CM

## 2019-08-16 DIAGNOSIS — M15.9 OSTEOARTHRITIS OF MULTIPLE JOINTS, UNSPECIFIED OSTEOARTHRITIS TYPE: Primary | ICD-10-CM

## 2019-08-16 RX ORDER — LISINOPRIL 5 MG/1
TABLET ORAL
Qty: 90 TAB | Refills: 0 | Status: SHIPPED | OUTPATIENT
Start: 2019-08-16 | End: 2019-11-14 | Stop reason: SDUPTHER

## 2019-08-16 RX ORDER — TRAMADOL HYDROCHLORIDE 50 MG/1
TABLET ORAL
Qty: 540 TAB | Refills: 0 | Status: SHIPPED | OUTPATIENT
Start: 2019-08-16 | End: 2019-09-15

## 2019-08-19 ENCOUNTER — DOCUMENTATION ONLY (OUTPATIENT)
Dept: INTERNAL MEDICINE CLINIC | Age: 84
End: 2019-08-19

## 2019-08-19 NOTE — PROGRESS NOTES
The following prescription was called into pt's local pharmacy:    traMADol (ULTRAM) 50 mg tablet [[id number]]     Order Details   Dose, Route, Frequency: As Directed    Dispense Quantity: 540 Tab Refills: 0 Fills remaining: --           Sig: TAKE TWO TABLETS BY MOUTH THREE TIMES A DAY          Written Date: 08/16/19 Expiration Date: --

## 2019-09-20 ENCOUNTER — OFFICE VISIT (OUTPATIENT)
Dept: INTERNAL MEDICINE CLINIC | Age: 84
End: 2019-09-20

## 2019-09-20 VITALS
HEIGHT: 70 IN | BODY MASS INDEX: 24.2 KG/M2 | SYSTOLIC BLOOD PRESSURE: 104 MMHG | HEART RATE: 81 BPM | DIASTOLIC BLOOD PRESSURE: 62 MMHG | TEMPERATURE: 98.1 F | OXYGEN SATURATION: 97 % | RESPIRATION RATE: 16 BRPM | WEIGHT: 169 LBS

## 2019-09-20 DIAGNOSIS — Z23 ENCOUNTER FOR IMMUNIZATION: Primary | ICD-10-CM

## 2019-09-20 DIAGNOSIS — C61 PROSTATE CANCER (HCC): ICD-10-CM

## 2019-09-20 DIAGNOSIS — E78.2 MIXED HYPERLIPIDEMIA: ICD-10-CM

## 2019-09-20 DIAGNOSIS — N18.30 CHRONIC KIDNEY DISEASE, STAGE III (MODERATE) (HCC): ICD-10-CM

## 2019-09-20 DIAGNOSIS — K59.03 DRUG-INDUCED CONSTIPATION: ICD-10-CM

## 2019-09-20 DIAGNOSIS — E55.9 VITAMIN D DEFICIENCY: ICD-10-CM

## 2019-09-20 DIAGNOSIS — J30.89 PERENNIAL ALLERGIC RHINITIS: ICD-10-CM

## 2019-09-20 DIAGNOSIS — K21.9 GASTROESOPHAGEAL REFLUX DISEASE WITHOUT ESOPHAGITIS: ICD-10-CM

## 2019-09-20 DIAGNOSIS — M15.9 OSTEOARTHRITIS OF MULTIPLE JOINTS, UNSPECIFIED OSTEOARTHRITIS TYPE: ICD-10-CM

## 2019-09-20 RX ORDER — TRAMADOL HYDROCHLORIDE 50 MG/1
50 TABLET ORAL
COMMUNITY
End: 2019-11-14 | Stop reason: SDUPTHER

## 2019-09-20 NOTE — PROGRESS NOTES
HISTORY OF PRESENT ILLNESS  Orin Winters is a 80 y.o. male. HPI   Last here 6/13/19. Pt is here to f/u on chronic conditions. Pt ambulates with a cane. Discussed that with tramadol rx he should come in q3 months      BP is 104/62  No home BP readings   Continues on lisinopril 5mg daily to protect his kidneys     Wt today is 169 lbs --stable x lov   Wt is in nl ranges      Reviewed labs 6/19       Previously, I referred the pt to Dr. Kasey Vera (rheum)  However, he did not schedule an appt with this rheum  Not intertested in rheum at this time     Pt follows with Dr. Álvaro Hahn ([facility]) for prostate cancer  Pt's PSA in 12/18 was 0.2  dc'ed from [facility], discussed his PSA was elevated, however pt was not interested in tx   Recall last note 3/30/16:  PSA undetectable, pt doing well no further UTI. Recall pt's prostate is removed     Advised him to take vit D OTC 1000U daily   Pt started this but then stopped taking   Discussed he needs to continue taking this       Pt has been taking prilosec prn (infrequently), as his reflux has not been an issue  He takes this if eats a trigger food  No longer planning on getting an EGD  Cancelled his last EGD       Continues on xyzol qhs for allergies - typically works well but sometimes has to take Claritin in the AM   Recall pt is allergic to fungus and pet dander     Pt uses 1 dose of lactulose prn 10ml  which helps with BMs has only had to use this once since being refilled        Pt continues on tramadol with meals for his back pain (6 tabs daily)  He tried cutting back on this but was unable to  Pt gets 540 tabs tramadol q3 months  Pt knows not to give this to anyone else  This helps with his pain   Denies making him overly dizzy   Pt also has percocet to use prn (rarely - took last month, bottle lasted >2 years) for more severe back pain  Pain contract signed on 06/13/19   Urine drug screen complete 12/18, ordered 06/13/19            ACP not on file.  SDM is his son Luca Garza).     Recall the patient has OA in his hands which bothers him the most, also some in the neck. He continues to take tramadol for this 6 tabs per day. Gets 540 tabs for 3 months. --i prescribe this now, we addressed this last time and he stated he still gets pain and does not think the tramadol does anything but takes it for maintenance anyway       Recall he has a dx of prostate cancer since January 2013 , sees calin annually and last saw him in January 2015--states he was released from care at that time.    Recall the patient has h/o ckd, last cr 1.5 continues to be stable. , prior was 1.77, uns 1.5-1.77  Recall that he Used to follow with rheumatologist dr Vladimir Cordova years ago --currently not seeing anyone. Recall the patient has hansens and a h/o stomach ulcer, sees ry for this.  Sx controlled with prilosec bid , he states he saw him last year or the year before, sx well controlled, only one or 2 breakthrough episodes.        PREVENTIVE:    Colonoscopy: 10/22/13, Dr. Hector Chambers, repeat 10 years  EGD: 4/16/16, Dr Hector Chambers, repeat 3 years  PSA: 1/13 dx'd with prostate cancer, 3/16, 12/17 0.1, 6/18 0.1, 12/18 0.2, followed by Dr. Shabana DeL eon  AAA screen: 10/10, negative  Tdap: declines    Pneumovax 10/10    Prevnar 13: 6/14/17  Shingrix: ordered 06/13/18 $$ not getting   Flu shot: 09/20/19   Eye: Dr. Lianna Scott  A1c: 6/16 5.9, 12/16 5.7  Pain contract: signed 6/14/17  Urine drug screen: 06/13/19 c/w tramadol     Patient Active Problem List    Diagnosis Date Noted    Primary osteoarthritis of both hands 06/13/2018    ACP (advance care planning) 12/11/2015    Prostate cancer ([facility] [address] 02/18/2013    Syncope 08/21/2011    Chronic kidney disease, stage III (moderate) ([facility] [address] 06/28/2011    Vitamin D deficiency 06/27/2011    DJD (degenerative joint disease), multiple sites 03/09/2010    GERD (gastroesophageal reflux disease) 03/09/2010    History of peptic ulcer disease 03/09/2010    BPH (benign prostatic hypertrophy) 03/09/2010    Perennial allergic rhinitis 03/09/2010     Current Outpatient Medications   Medication Sig Dispense Refill    lisinopril (PRINIVIL, ZESTRIL) 5 mg tablet TAKE ONE TABLET BY MOUTH DAILY 90 Tab 0    levocetirizine (XYZAL) 5 mg tablet TAKE ONE TABLET BY MOUTH EVERY NIGHT AT BEDTIME 90 Tab 1    albuterol sulfate 90 mcg/actuation aepb Take 2 Puffs by inhalation every four (4) hours as needed for Cough (or wheeze).  Indications: wheeze or cough; please dispense with chamber/spacer 1 Inhaler 0     Past Surgical History:   Procedure Laterality Date    ENDOSCOPY, COLON, DIAGNOSTIC  2006    by [facility] MD; normal    HX CATARACT REMOVAL  12/28/2014    both eyes    HX COLONOSCOPY  10/22/2013    HX ENDOSCOPY      Followed by Dr. Jessica Pulido (multiple)    HX HEENT  1970's    septoplasty    HX ORTHOPAEDIC  2000    right rotator cuff repair    HX ORTHOPAEDIC      right knee total replacement    HX PROSTATECTOMY  2010    green light laser by Dr. Chris Mosley  5/7/13    CYSTOSCOPY WITH OPTICAL INTERNAL URETHROTOMY, AND CRYOABLATION OF PROSTATE      Lab Results   Component Value Date/Time    WBC 6.5 04/08/2019 08:44 AM    HGB 13.7 04/08/2019 08:44 AM    HCT 41.0 04/08/2019 08:44 AM    PLATELET 890 45/00/7252 08:44 AM    MCV 96.7 04/08/2019 08:44 AM     Lab Results   Component Value Date/Time    Cholesterol, total 164 12/21/2018 10:43 AM    HDL Cholesterol 44 12/21/2018 10:43 AM    LDL, calculated 99 12/21/2018 10:43 AM    Triglyceride 105 12/21/2018 10:43 AM    CHOL/HDL Ratio 3.6 06/25/2010 08:36 AM     Lab Results   Component Value Date/Time    GFR est non-AA 38 (L) 06/27/2019 10:01 AM    GFR est AA 44 (L) 06/27/2019 10:01 AM    Creatinine 1.63 (H) 06/27/2019 10:01 AM    BUN 38 (H) 06/27/2019 10:01 AM    Sodium 141 06/27/2019 10:01 AM    Potassium 4.9 06/27/2019 10:01 AM    Chloride 106 06/27/2019 10:01 AM    CO2 22 06/27/2019 10:01 AM        Review of Systems Respiratory: Negative for shortness of breath. Cardiovascular: Negative for chest pain. Physical Exam   Constitutional: He is oriented to person, place, and time. He appears well-developed and well-nourished. No distress. HENT:   Head: Normocephalic and atraumatic. Mouth/Throat: Oropharynx is clear and moist. No oropharyngeal exudate. Eyes: Conjunctivae and EOM are normal. Right eye exhibits no discharge. Left eye exhibits no discharge. Neck: Normal range of motion. Neck supple. Cardiovascular: Normal rate, regular rhythm and normal heart sounds. Exam reveals no gallop and no friction rub. No murmur heard. Pulmonary/Chest: Effort normal and breath sounds normal. No respiratory distress. He has no wheezes. He has no rales. He exhibits no tenderness. Musculoskeletal: Normal range of motion. He exhibits no edema, tenderness or deformity. Lymphadenopathy:     He has no cervical adenopathy. Neurological: He is alert and oriented to person, place, and time. He has normal reflexes. Coordination normal.   Skin: Skin is warm and dry. No rash noted. He is not diaphoretic. No erythema. No pallor. Psychiatric: He has a normal mood and affect. His behavior is normal.       ASSESSMENT and PLAN    ICD-10-CM ICD-9-CM    1. Chronic kidney disease, stage III (moderate) (HCC)                Cr runs around 1.5-1.7 baseline stable on last labs will repeat prior to f/u  N18.3 585.3 LIPID PANEL      METABOLIC PANEL, COMPREHENSIVE      CBC W/O DIFF      TSH 3RD GENERATION      VITAMIN D, 25 HYDROXY      PSA, DIAGNOSTIC (PROSTATE SPECIFIC AG)   2.  Prostate cancer ([facility] [address]              Has mild biochemical reoccurrence will repeat psa at f/u discussed following up with dr Feliciano Edge but he again refuses  C61 [id number] LIPID PANEL      METABOLIC PANEL, COMPREHENSIVE      CBC W/O DIFF      TSH 3RD GENERATION      VITAMIN D, 25 HYDROXY      PSA, DIAGNOSTIC (PROSTATE SPECIFIC AG)   3. Gastroesophageal reflux disease without esophagitis                Controlled with prilosec prn rarely needs declines repeat egd  K21.9 530.81 LIPID PANEL      METABOLIC PANEL, COMPREHENSIVE      CBC W/O DIFF      TSH 3RD GENERATION      VITAMIN D, 25 HYDROXY      PSA, DIAGNOSTIC (PROSTATE SPECIFIC AG)   4. Osteoarthritis of multiple joints, unspecified osteoarthritis type                  Takes tramadol which he gets from get[facility] 6 per day 540 per month pain contract signed urine drug screen in 6/19 c/w tramadol no signs of abuse has tried and failed other regimens will continue to prescribe  M15.9 715.89 LIPID PANEL      METABOLIC PANEL, COMPREHENSIVE      CBC W/O DIFF      TSH 3RD GENERATION      VITAMIN D, 25 HYDROXY      PSA, DIAGNOSTIC (PROSTATE SPECIFIC AG)   5. Drug-induced constipation              Lactulose prn  K59.03 564.09 LIPID PANEL     Y600.1 METABOLIC PANEL, COMPREHENSIVE      CBC W/O DIFF      TSH 3RD GENERATION      VITAMIN D, 25 HYDROXY      PSA, DIAGNOSTIC (PROSTATE SPECIFIC AG)   6. Perennial allergic rhinitis                Controled with xyzal occassionally uses additional allergy med  J30.89 477.8 LIPID PANEL      METABOLIC PANEL, COMPREHENSIVE      CBC W/O DIFF      TSH 3RD GENERATION      VITAMIN D, 25 HYDROXY      PSA, DIAGNOSTIC (PROSTATE SPECIFIC AG)   7. Mixed hyperlipidemia              Will check lipids prior to f/u diet controlled  E78.2 272.2 LIPID PANEL      METABOLIC PANEL, COMPREHENSIVE      CBC W/O DIFF      TSH 3RD GENERATION      VITAMIN D, 25 HYDROXY      PSA, DIAGNOSTIC (PROSTATE SPECIFIC AG)   8. Vitamin D deficiency              Resume otc vit D  E55.9 268.9 LIPID PANEL      METABOLIC PANEL, COMPREHENSIVE      CBC W/O DIFF      TSH 3RD GENERATION      VITAMIN D, 25 HYDROXY      PSA, DIAGNOSTIC (PROSTATE SPECIFIC AG)            Scribed by Jv Perkins of [address] [place] [address] [address], as dictated by Dr. Javier Garza. Current diagnosis and concerns discussed with pt at length.  Pt understands risks and benefits or current treatment plan and medications, and accepts the treatment and medication with any possible risks. Pt asks appropriate questions, which were answered. Pt was instructed to call with any concerns or problems. I have reviewed the note documented by the scribe. The services provided are my own.   The documentation is accurate

## 2019-09-20 NOTE — PATIENT INSTRUCTIONS
Office Policies    Phone calls/patient messages:            Please allow up to 24 hours for someone in the office to contact you about your call or message. Be mindful your provider may be out of the office or your message may require further review. We encourage you to use [employer] for your messages as this is a faster, more efficient way to communicate with our office                         Medication Refills:            Prescription medications require 48-72 business hours to process. We encourage you to use [employer] for your refills. For controlled medications: Please allow 72 business hours to process. Certain medications may require you to  a written prescription at our office. NO narcotic/controlled medications will be prescribed after 4pm Monday through Friday or on weekends              Form/Paperwork Completion:            Please note a $25 fee may incur for all paperwork for completed by our providers. We ask that you allow 7-10 business days. Pre-payment is due prior to picking up/faxing the completed form. You may also download your forms to [employer] to have your doctor print off.       Labs 1 week prior to follow up

## 2019-09-24 NOTE — PROGRESS NOTES
After obtaining consent, and per verbal order from Dr. Sorin Kwan, patient received influenza vaccine given by Hector Valdez in Left Deltoid. Influenza Vaccine 0.5 mL IM now. Patient was observed for 10 minutes post injection. Patient tolerated injection well. VIS given.

## 2019-11-19 ENCOUNTER — DOCUMENTATION ONLY (OUTPATIENT)
Dept: INTERNAL MEDICINE CLINIC | Age: 84
End: 2019-11-19

## 2019-11-26 ENCOUNTER — DOCUMENTATION ONLY (OUTPATIENT)
Dept: INTERNAL MEDICINE CLINIC | Age: 84
End: 2019-11-26

## 2019-11-26 NOTE — PROGRESS NOTES
Medicare Part B Preventive Services  Limitations  Recommendation  Scheduled    Bone Mass Measurement  (age 72 & older, biennial)  Requires diagnosis related to osteoporosis or estrogen deficiency. Biennial benefit unless patient has history of long-term glucocorticoid tx or baseline is needed because initial test was by other method  N/A  N/A    Cardiovascular Screening Blood Tests (every 5 years)  Total cholesterol, HDL, Triglycerides  Order as a panel if possible  Completed 12/2018     Recommended annually  Due now    Colorectal Cancer Screening  -Fecal occult blood test (annual)  -Flexible sigmoidoscopy (5y)  -Screening colonoscopy (10y)  -Barium Enema     Completed 10/2013 with Dr. Reed Soto     Recommended in 10 years  Due 10/2023    Counseling to Prevent Tobacco Use (up to 8 sessions per year)  - Counseling greater than 3 and up to 10 minutes  - Counseling greater than 10 minutes  Patients must be asymptomatic of tobacco-related conditions to receive as preventive service  N/A  N/A    Diabetes Screening Tests (at least every 3 years, Medicare covers annually or at 6-month intervals for prediabetic patients)      Fasting blood sugar (FBS) or glucose tolerance test (GTT)  Patient must be diagnosed with one of the following:  -Hypertension, Dyslipidemia, obesity, previous impaired FBS or GTT  Or any two of the following: overweight, FH of diabetes, age ? 72, history of gestational diabetes, birth of baby weighing more than 9 pounds  Completed 6/2019 with [facility]     Recommended every 3 years for non-diabetics, typically checked annually Due 6/2020      Diabetes Self-Management Training (DSMT) (no USPSTF recommendation)  Requires referral by treating physician for patient with diabetes or renal disease. 10 hours of initial DSMT session of no less than 30 minutes each in a continuous 12-month period. 2 hours of follow-up DSMT in subsequent years.   N/A  N/A    Glaucoma Screening (no USPSTF recommendation)  Diabetes mellitus, family history, , age 48 or over,  American, age 72 or over  Completed 2/2019     Recommended annually  Due 2/2020    Human Immunodeficiency Virus (HIV) Screening (annually for increased risk patients)  HIV-1 and HIV-2 by EIA, ALFREDO, rapid antibody test, or oral mucosa transudate  Patient must be at increased risk for HIV infection per USPSTF guidelines or pregnant. Tests covered annually for patients at increased risk. Pregnant patients may receive up to 3 test during pregnancy. N/A  N/A    Medical Nutrition Therapy (MNT) (for diabetes or renal disease not recommended schedule)  Requires referral by treating physician for patient with diabetes or renal disease. Can be provided in same year as diabetes self-management training (DSMT), and CMS recommends medical nutrition therapy take place after DSMT. Up to 3 hours for initial year and 2 hours in subsequent years. N/A  N/A    Prostate Cancer Screening (annually up to age 76)  - Digital rectal exam (ISAEL)  - Prostate specific antigen (PSA)  Annually (age 48 or over), ISAEL not paid separately when covered E/M service is provided on same date  Completed 12/2018 Due now    Seasonal Influenza Vaccination (annually)     Completed Fall 2019     Recommended annually  Due Fall 2020   Pneumococcal Vaccination (once after 72)     Pneumovax - 10/2010     Prevnar 13 - 6/2017     Both recommended once over the age of 72  Completed         Completed    Hepatitis B Vaccinations (if medium/high risk)  Medium/high risk factors: End-stage renal disease,  Hemophiliacs who received Factor VIII or IX concentrates, Clients of institutions for the mentally retarded, Persons who live in the same house as a HepB virus carrier, Homosexual men, Illicit injectable drug abusers.   N/A  N/A                        Ultrasound Screening for Abdominal Aortic Aneurysm (AAA) (once)  Patient must be referred through IPPE and not have had a screening for abdominal aortic aneurysm before under Medicare.  Limited to patients who meet one of the following criteria:  - Men who are 73-68 years old and have smoked more than 100 cigarettes in their lifetime.  -Anyone with a FH of AAA  -Anyone recommended for screening by USPSTF  Completed 10/2010 - negative for AAA  Completed

## 2019-12-13 DIAGNOSIS — E78.2 MIXED HYPERLIPIDEMIA: ICD-10-CM

## 2019-12-13 DIAGNOSIS — M15.9 OSTEOARTHRITIS OF MULTIPLE JOINTS, UNSPECIFIED OSTEOARTHRITIS TYPE: ICD-10-CM

## 2019-12-13 DIAGNOSIS — J30.89 PERENNIAL ALLERGIC RHINITIS: ICD-10-CM

## 2019-12-13 DIAGNOSIS — K21.9 GASTROESOPHAGEAL REFLUX DISEASE WITHOUT ESOPHAGITIS: ICD-10-CM

## 2019-12-13 DIAGNOSIS — N18.30 CHRONIC KIDNEY DISEASE, STAGE III (MODERATE) (HCC): ICD-10-CM

## 2019-12-13 DIAGNOSIS — K59.03 DRUG-INDUCED CONSTIPATION: ICD-10-CM

## 2019-12-13 DIAGNOSIS — E55.9 VITAMIN D DEFICIENCY: ICD-10-CM

## 2019-12-13 DIAGNOSIS — C61 PROSTATE CANCER (HCC): ICD-10-CM

## 2019-12-14 LAB
25(OH)D3+25(OH)D2 SERPL-MCNC: 33 NG/ML (ref 30–100)
ALBUMIN SERPL-MCNC: 4.2 G/DL (ref 3.5–4.7)
ALBUMIN/GLOB SERPL: 1.4 {RATIO} (ref 1.2–2.2)
ALP SERPL-CCNC: 93 IU/L (ref 39–117)
ALT SERPL-CCNC: 13 IU/L (ref 0–44)
AST SERPL-CCNC: 18 IU/L (ref 0–40)
BILIRUB SERPL-MCNC: 0.5 MG/DL (ref 0–1.2)
BUN SERPL-MCNC: 37 MG/DL (ref 8–27)
BUN/CREAT SERPL: 21 (ref 10–24)
CALCIUM SERPL-MCNC: 9.6 MG/DL (ref 8.6–10.2)
CHLORIDE SERPL-SCNC: 105 MMOL/L (ref 96–106)
CHOLEST SERPL-MCNC: 160 MG/DL (ref 100–199)
CO2 SERPL-SCNC: 20 MMOL/L (ref 20–29)
CREAT SERPL-MCNC: 1.79 MG/DL (ref 0.76–1.27)
ERYTHROCYTE [DISTWIDTH] IN BLOOD BY AUTOMATED COUNT: 12 % (ref 12.3–15.4)
GLOBULIN SER CALC-MCNC: 2.9 G/DL (ref 1.5–4.5)
GLUCOSE SERPL-MCNC: 91 MG/DL (ref 65–99)
HCT VFR BLD AUTO: 37.7 % (ref 37.5–51)
HDLC SERPL-MCNC: 49 MG/DL
HGB BLD-MCNC: 12.8 G/DL (ref 13–17.7)
LDLC SERPL CALC-MCNC: 93 MG/DL (ref 0–99)
MCH RBC QN AUTO: 32.7 PG (ref 26.6–33)
MCHC RBC AUTO-ENTMCNC: 34 G/DL (ref 31.5–35.7)
MCV RBC AUTO: 96 FL (ref 79–97)
PLATELET # BLD AUTO: 294 X10E3/UL (ref 150–450)
POTASSIUM SERPL-SCNC: 5.5 MMOL/L (ref 3.5–5.2)
PROT SERPL-MCNC: 7.1 G/DL (ref 6–8.5)
PSA SERPL-MCNC: 0.3 NG/ML (ref 0–4)
RBC # BLD AUTO: 3.91 X10E6/UL (ref 4.14–5.8)
SODIUM SERPL-SCNC: 140 MMOL/L (ref 134–144)
TRIGL SERPL-MCNC: 91 MG/DL (ref 0–149)
TSH SERPL DL<=0.005 MIU/L-ACNC: 0.21 UIU/ML (ref 0.45–4.5)
VLDLC SERPL CALC-MCNC: 18 MG/DL (ref 5–40)
WBC # BLD AUTO: 8.3 X10E3/UL (ref 3.4–10.8)

## 2019-12-20 ENCOUNTER — OFFICE VISIT (OUTPATIENT)
Dept: INTERNAL MEDICINE CLINIC | Age: 84
End: 2019-12-20

## 2019-12-20 VITALS
DIASTOLIC BLOOD PRESSURE: 65 MMHG | SYSTOLIC BLOOD PRESSURE: 107 MMHG | HEIGHT: 70 IN | RESPIRATION RATE: 16 BRPM | WEIGHT: 169 LBS | OXYGEN SATURATION: 97 % | BODY MASS INDEX: 24.2 KG/M2 | HEART RATE: 85 BPM | TEMPERATURE: 97.7 F

## 2019-12-20 DIAGNOSIS — K21.9 GASTROESOPHAGEAL REFLUX DISEASE WITHOUT ESOPHAGITIS: ICD-10-CM

## 2019-12-20 DIAGNOSIS — E87.5 HYPERKALEMIA: ICD-10-CM

## 2019-12-20 DIAGNOSIS — Z00.00 MEDICARE ANNUAL WELLNESS VISIT, SUBSEQUENT: ICD-10-CM

## 2019-12-20 DIAGNOSIS — M19.041 PRIMARY OSTEOARTHRITIS OF BOTH HANDS: ICD-10-CM

## 2019-12-20 DIAGNOSIS — G89.4 CHRONIC PAIN SYNDROME: ICD-10-CM

## 2019-12-20 DIAGNOSIS — M19.042 PRIMARY OSTEOARTHRITIS OF BOTH HANDS: ICD-10-CM

## 2019-12-20 DIAGNOSIS — E05.90 HYPERTHYROIDISM, SUBCLINICAL: ICD-10-CM

## 2019-12-20 DIAGNOSIS — E55.9 VITAMIN D DEFICIENCY: ICD-10-CM

## 2019-12-20 DIAGNOSIS — C61 PROSTATE CANCER (HCC): ICD-10-CM

## 2019-12-20 DIAGNOSIS — J30.89 PERENNIAL ALLERGIC RHINITIS: ICD-10-CM

## 2019-12-20 DIAGNOSIS — Z23 ENCOUNTER FOR IMMUNIZATION: Primary | ICD-10-CM

## 2019-12-20 DIAGNOSIS — Z79.891 ADMISSION FOR LONG-TERM OPIATE ANALGESIC USE: ICD-10-CM

## 2019-12-20 DIAGNOSIS — N18.30 CHRONIC KIDNEY DISEASE, STAGE III (MODERATE) (HCC): ICD-10-CM

## 2019-12-20 RX ORDER — LEVOCETIRIZINE DIHYDROCHLORIDE 5 MG/1
TABLET, FILM COATED ORAL
Qty: 90 TAB | Refills: 1 | Status: SHIPPED | OUTPATIENT
Start: 2019-12-20 | End: 2020-08-03

## 2019-12-20 NOTE — PROGRESS NOTES
HISTORY OF PRESENT ILLNESS  Lynette Villegas is a 80 y.o. male. HPI   Last here 9/20/19. Pt is here to f/u on chronic conditions. BP today is 107/65  Continues on lisinopril 5mg daily to protect his kidneys  Discussed d/t elevated K+ and lower BP we can stop lisinopril      Wt is stable- in normal ranges   He goes to the gym every day      Reviewed labs 12/19  Thyroid function abnormal- discussed may be d/t tramadol  Potassium elevated - we will stop lisinopril       Previously, I referred the pt to Dr. Wili Bennett (rheum)  However, he did not schedule an appt with this rheum  Not intertested in rheum at this time     Pt follows with Dr. Jose M Joyner (uro) for prostate cancer  Last PSA 0.3- rising  Discussed his PSA continues to climb- discussed f/u with urology to address this  Discussed I think this is important to follow up on and though he has had prostate removed the cancer can still recur   Pt is aware and will consider scheduling follow up - he does not seem amenable to this today in clinic   Recall last note 3/30/16:  PSA undetectable, pt doing well no further UTI.    Recall pt's prostate is removed     Advised him to take vit D OTC 1000U daily   Pt started this but then stopped taking   Discussed he needs to continue taking this       Continues prilosec prn for heartburn  He uses this quite infrequently, has not been having any sx recently   If he eats a trigger food he occasionally takes this   Has not refilled this prescription  He had an EGD scheduled previously but cancelled this      Continues on xyzol qhs for allergies - typically works well but sometimes has to take Claritin in the AM   Recall pt is allergic to fungus and pet dander     Pt uses 1 dose of lactulose prn 10ml  which helps with BMs has only had to use this once since being refilled         I give him tramadol for his back and hand pain  He gets 540 tablets q3 months- about 6 per day- this works well, controls his pain  He has been cutting back his tramadol this month  He has not been driving as much so his hand pain is improved   Pt knows not to give this to anyone else  This helps with his pain   Pt also has percocet to use prn (rarely - took last month, bottle lasted >2 years) for more severe back pain  Pain contract signed on 06/13/19   Urine drug screen complete 12/18, ordered 06/13/19      Pt occasionally uses cane when his knee hurts       Lives alone currently   His son lives close by       Pt is functionally independent         No memory concerns   Knows the month, day, year         Can recall 3/3 objects              ACP not on file. SDM is his son Priya Duff).     Recall the patient has OA in his hands which bothers him the most, also some in the neck. He continues to take tramadol for this 6 tabs per day. Gets 540 tabs for 3 months. --i prescribe this now, we addressed this last time and he stated he still gets pain and does not think the tramadol does anything but takes it for maintenance anyway       Recall he has a dx of prostate cancer since January 2013 , sees calin annually and last saw him in January 2015--states he was released from care at that time.    Recall the patient has h/o ckd, last cr 1.5 continues to be stable. , prior was 1.77, uns 1.5-1.77  Recall that he Used to follow with rheumatologist dr Milka Osullivan years ago --currently not seeing anyone. Recall the patient has hansens and a h/o stomach ulcer, sees ry for this.  Sx controlled with prilosec bid , he states he saw him last year or the year before, sx well controlled, only one or 2 breakthrough episodes.        PREVENTIVE:    Colonoscopy: 10/22/13, Dr. Beryle Gee, repeat 10 years  EGD: 4/16/16, Dr Beryle Gee, repeat 3 years  PSA: 1/13 dx'd with prostate cancer, 3/16, 12/17 0.1, 6/18 0.1, 12/18 0.2, followed by Dr. Nataly Alvarado, 12/19 0.3  AAA screen: 10/10, negative  Tdap: declines further   Pneumovax 10/2010, updated 12/20/2019  Prevnar 13: 6/14/2017  Shingrix: ordered 06/13/18 $$ not getting   Flu shot: 09/20/19   Eye exam: Dr. Kiley Triana LDL 93  A1c: 6/16 5.9, 12/16 5.7  Pain contract: signed 6/2019  Urine drug screen: 06/13/19 c/w tramadol, ordered 12/19               Patient Active Problem List    Diagnosis Date Noted    Primary osteoarthritis of both hands 06/13/2018    ACP (advance care planning) 12/11/2015    [facility]18[facility]2013    Syncope 08/21/2011    Chronic kidney disease, stage III (moderate) ([facility] [address] 06/28/2011    Vitamin D deficiency 06/27/2011    DJD (degenerative joint disease), multiple sites 03/09/2010    GERD (gastroesophageal reflux disease) 03/09/2010    History of peptic ulcer disease 03/09/2010    BPH (benign prostatic hypertrophy) 03/09/2010    Perennial allergic rhinitis 03/09/2010     Current Outpatient Medications   Medication Sig Dispense Refill    varicella-zoster recombinant, PF, (SHINGRIX, PF,) 50 mcg/0.5 mL susr injection 0.5mL by IntraMUSCular route once now and then repeat in 2-6 months 0.5 mL 1    lisinopril (PRINIVIL, ZESTRIL) 5 mg tablet TAKE ONE TABLET BY MOUTH DAILY 90 Tab 0    traMADol (ULTRAM) 50 mg tablet TAKE TWO TABLETS BY MOUTH THREE TIMES A  Tab 0    levocetirizine (XYZAL) 5 mg tablet TAKE ONE TABLET BY MOUTH EVERY NIGHT AT BEDTIME 90 Tab 1    albuterol sulfate 90 mcg/actuation aepb Take 2 Puffs by inhalation every four (4) hours as needed for Cough (or wheeze).  Indications: wheeze or cough; please dispense with chamber/spacer 1 Inhaler 0     Past Surgical History:   Procedure Laterality Date    ENDOSCOPY, COLON, DIAGNOSTIC  2006    by [facility]; normal    HX CATARACT REMOVAL  12/28/2014    both eyes    HX COLONOSCOPY  10/22/2013    HX ENDOSCOPY      Followed by Dr. Yesi Hernandez (multiple)    HX HEENT  1970's    septoplasty    HX ORTHOPAEDIC  2000    right rotator cuff repair    HX ORTHOPAEDIC      right knee total replacement    HX PROSTATECTOMY  2010    green light laser by Dr. Leah Dyson  5/7/13    CYSTOSCOPY WITH OPTICAL INTERNAL URETHROTOMY, AND CRYOABLATION OF PROSTATE      Lab Results   Component Value Date/Time    WBC 8.3 12/13/2019 10:05 AM    HGB 12.8 (L) 12/13/2019 10:05 AM    HCT 37.7 12/13/2019 10:05 AM    PLATELET 316 31/22/3361 10:05 AM    MCV 96 12/13/2019 10:05 AM     Lab Results   Component Value Date/Time    Cholesterol, total 160 12/13/2019 10:05 AM    HDL Cholesterol 49 12/13/2019 10:05 AM    LDL, calculated 93 12/13/2019 10:05 AM    Triglyceride 91 12/13/2019 10:05 AM    CHOL/HDL Ratio 3.6 06/25/2010 08:36 AM     Lab Results   Component Value Date/Time    GFR est non-AA 34 (L) 12/13/2019 10:05 AM    GFR est AA 39 (L) 12/13/2019 10:05 AM    Creatinine 1.79 (H) 12/13/2019 10:05 AM    BUN 37 (H) 12/13/2019 10:05 AM    Sodium 140 12/13/2019 10:05 AM    Potassium 5.5 (H) 12/13/2019 10:05 AM    Chloride 105 12/13/2019 10:05 AM    CO2 20 12/13/2019 10:05 AM        Review of Systems   Respiratory: Negative for shortness of breath. Cardiovascular: Negative for chest pain. Physical Exam  Constitutional:       General: He is not in acute distress. Appearance: He is well-developed. He is not diaphoretic. HENT:      Head: Normocephalic and atraumatic. Mouth/Throat:      Mouth: Mucous membranes are moist.      Pharynx: Oropharynx is clear. No oropharyngeal exudate or posterior oropharyngeal erythema. Eyes:      Conjunctiva/sclera: Conjunctivae normal.   Neck:      Musculoskeletal: Normal range of motion and neck supple. Cardiovascular:      Rate and Rhythm: Normal rate and regular rhythm. Heart sounds: Normal heart sounds. No murmur. No friction rub. No gallop. Pulmonary:      Effort: Pulmonary effort is normal. No respiratory distress. Breath sounds: Normal breath sounds. No wheezing or rales. Chest:      Chest wall: No tenderness. Abdominal:      General: There is no distension. Palpations: Abdomen is soft. There is no mass. Tenderness: There is no tenderness. There is no right CVA tenderness, left CVA tenderness, guarding or rebound. Hernia: No hernia is present. Musculoskeletal: Normal range of motion. General: No tenderness or deformity. Lymphadenopathy:      Cervical: No cervical adenopathy. Skin:     General: Skin is warm and dry. Coloration: Skin is not pale. Findings: No erythema or rash. Neurological:      Mental Status: He is alert and oriented to person, place, and time. Coordination: Coordination normal.      Deep Tendon Reflexes: Reflexes are normal and symmetric. Psychiatric:         Behavior: Behavior normal.         ASSESSMENT and PLAN    ICD-10-CM ICD-9-CM    1. Encounter for immunization    Pneumovax given today  Z23 V03.89 SC IMMUNIZ ADMIN,1 SINGLE/COMB VAC/TOXOID      PNEUMOCOCCAL POLYSACCHARIDE VACCINE, 23-VALENT, ADULT OR IMMUNOSUPPRESSED PT DOSE,      ADMIN PNEUMOCOCCAL VACCINE      ADMIN INFLUENZA VIRUS VAC      CANCELED: INFLUENZA VIRUS VAC QUAD,SPLIT,PRESV FREE SYRINGE IM   2. Chronic pain syndrome     reviewed, pt is on tramadol, takes 6 per day, has been doing this stably for many years, no signs of abuse, this controls his sx, pain contract signed, continue. G89.4 338.4 levocetirizine (XYZAL) 5 mg tablet      METABOLIC PANEL, BASIC      TSH 3RD GENERATION      T4, FREE   3. Medicare annual wellness visit, subsequent     A88.12 A13.8 METABOLIC PANEL, BASIC      TSH 3RD GENERATION      T4, FREE   4. Prostate cancer ([facility] [address]    PSAs have been climbing again, I have discussed very visit the need to f/u with  as this indicated biochemical recurrence. He expressed understanding and states he will try to make a urology appointment. S79 [id number] METABOLIC PANEL, BASIC      TSH 3RD GENERATION      T4, FREE   5. Chronic kidney disease, stage III (moderate) (HCC)    Cr runs around 1.5-1.7 baseline, stable on recent labs.  Potassium has been elevated and BP has been running low, so we will stop lisinopril at this time as risk outweighs benefit Z53.5 139.8 METABOLIC PANEL, BASIC      TSH 3RD GENERATION      T4, FREE   6. Primary osteoarthritis of both hands    See above, stable on tramadol A28.642 359.71 METABOLIC PANEL, BASIC    D45.443  TSH 3RD GENERATION      T4, FREE   7. Vitamin D deficiency    Should be taking OTC vit D K80.3 223.8 METABOLIC PANEL, BASIC      TSH 3RD GENERATION      T4, FREE   8. Perennial allergic rhinitis    Controlled on xyzal Y85.63 758.3 METABOLIC PANEL, BASIC      TSH 3RD GENERATION      T4, FREE   9. Gastroesophageal reflux disease without esophagitis    Improved, not needing prilosec, decided to cancel his EGD P37.8 374.08 METABOLIC PANEL, BASIC      TSH 3RD GENERATION      T4, FREE   10. Hyperthyroidism, subclinical    Repeat TFTs, tramadol is likely playing a role in this I38.68 491.62 METABOLIC PANEL, BASIC      TSH 3RD GENERATION      T4, FREE   11. Hyperkalemia    Will stop lisinopril B67.5 276.8 METABOLIC PANEL, BASIC      TSH 3RD GENERATION      T4, FREE   12. Admission for long-term opiate analgesic use     Z79.891 V58.69 TOXASSURE SELECT 13 (MW)               Depression screen reviewed and negative        Written by Sanna Zelaya, as dictated by Dave Ricardo MD.    Current diagnosis and concerns discussed with pt at length. Understands risks and benefits or current treatment plan and medications and accepts the treatment and medication with any possible risks. Pt asks appropriate questions which were answered. Pt instructed to call with any concerns or problems. I have reviewed the note documented by the scribe. The services provided are my own.   The documentation is accurate

## 2019-12-20 NOTE — PATIENT INSTRUCTIONS
STOP LISINOPRIL  
 
SEE DR Pérez [facility] Medicare Wellness Visit, Male The best way to live healthy is to have a lifestyle where you eat a well-balanced diet, exercise regularly, limit alcohol use, and quit all forms of tobacco/nicotine, if applicable. Regular preventive services are another way to keep healthy. Preventive services (vaccines, screening tests, monitoring & exams) can help personalize your care plan, which helps you manage your own care. Screening tests can find health problems at the earliest stages, when they are easiest to treat. Ye follows the current, evidence-based guidelines published by the [facility] States Atilio Victorino ([facility]) when recommending preventive services for our patients. Because we follow these guidelines, sometimes recommendations change over time as research supports it. (For example, a prostate screening blood test is no longer routinely recommended for men with no symptoms). Of course, you and your doctor may decide to screen more often for some diseases, based on your risk and co-morbidities (chronic disease you are already diagnosed with). Preventive services for you include: - Medicare offers their members a free annual wellness visit, which is time for you and your primary care provider to discuss and plan for your preventive service needs. Take advantage of this benefit every year! 
-All adults over age 72 should receive the recommended pneumonia vaccines. Current USPSTF guidelines recommend a series of two vaccines for the best pneumonia protection.  
-All adults should have a flu vaccine yearly and tetanus vaccine every 10 years. 
-All adults age 48 and older should receive the shingles vaccines (series of two vaccines).       
-All adults age 38-68 who are overweight should have a diabetes screening test once every three years.  
-Other screening tests & preventive services for persons with diabetes include: an eye exam to screen for diabetic retinopathy, a kidney function test, a foot exam, and stricter control over your cholesterol.  
-Cardiovascular screening for adults with routine risk involves an electrocardiogram (ECG) at intervals determined by the provider.  
-Colorectal cancer screening should be done for adults age 54-65 with no increased risk factors for colorectal cancer. There are a number of acceptable methods of screening for this type of cancer. Each test has its own benefits and drawbacks. Discuss with your provider what is most appropriate for you during your annual wellness visit. The different tests include: colonoscopy (considered the best screening method), a fecal occult blood test, a fecal DNA test, and sigmoidoscopy. 
-All adults born between [facility] should be screened once for Hepatitis C. 
-An Abdominal Aortic Aneurysm (AAA) Screening is recommended for men age 73-68 who has ever smoked in their lifetime. Here is a list of your current Health Maintenance items (your personalized list of preventive services) with a due date: 
Health Maintenance Due Topic Date Due  
 DTaP/Tdap/Td  (1 - Tdap) 06/17/1946  Shingles Vaccine (1 of 2) 06/17/1985  Glaucoma Screening   12/29/2016  Pneumococcal Vaccine (2 of 2 - PPSV23) 11/06/2019 [facility] Annual Well Visit  12/19/2019 Medicare Part B Preventive Services  Limitations  Recommendation  Scheduled Bone Mass Measurement 
(age 72 & older, biennial)  Requires diagnosis related to osteoporosis or estrogen deficiency. Biennial benefit unless patient has history of long-term glucocorticoid tx or baseline is needed because initial test was by other method  N/A  N/A Cardiovascular Screening Blood Tests (every 5 years) Total cholesterol, HDL, Triglycerides  Order as a panel if possible  Completed 12/2018 
  
Recommended annually  Due now Colorectal Cancer Screening 
-Fecal occult blood test (annual) -Flexible sigmoidoscopy (5y) 
-Screening colonoscopy (10y) -Barium Enema     Completed 10/2013 with Dr. Albert Weber 
  
Recommended in 10 years  Due 10/2023 Counseling to Prevent Tobacco Use (up to 8 sessions per year) - Counseling greater than 3 and up to 10 minutes - Counseling greater than 10 minutes  Patients must be asymptomatic of tobacco-related conditions to receive as preventive service  N/A  N/A Diabetes Screening Tests (at least every 3 years, Medicare covers annually or at 6-month intervals for prediabetic patients) 
   
Fasting blood sugar (FBS) or glucose tolerance test (GTT)  Patient must be diagnosed with one of the following: 
-Hypertension, Dyslipidemia, obesity, previous impaired FBS or GTT 
Or any two of the following: overweight, FH of diabetes, age ? 72, history of gestational diabetes, birth of baby weighing more than 9 pounds  Completed 6/2019 with GLU 93 
  
Recommended every 3 years for non-diabetics, typically checked annually Due 6/2020 
   
Diabetes Self-Management Training (DSMT) (no USPSTF recommendation)  Requires referral by treating physician for patient with diabetes or renal disease. 10 hours of initial DSMT session of no less than 30 minutes each in a continuous 12-month period. 2 hours of follow-up DSMT in subsequent years. N/A  N/A Glaucoma Screening (no USPSTF recommendation)  Diabetes mellitus, family history, , age 48 or over,  American, age 72 or over  Completed 2/2019 
  
Recommended annually  Due 2/2020 Human Immunodeficiency Virus (HIV) Screening (annually for increased risk patients) HIV-1 and HIV-2 by EIA, ALFREDO, rapid antibody test, or oral mucosa transudate  Patient must be at increased risk for HIV infection per USPSTF guidelines or pregnant. Tests covered annually for patients at increased risk. Pregnant patients may receive up to 3 test during pregnancy.   N/A  N/A   
 Medical Nutrition Therapy (MNT) (for diabetes or renal disease not recommended schedule)  Requires referral by treating physician for patient with diabetes or renal disease. Can be provided in same year as diabetes self-management training (DSMT), and CMS recommends medical nutrition therapy take place after DSMT. Up to 3 hours for initial year and 2 hours in subsequent years. N/A  N/A Prostate Cancer Screening (annually up to age 76) - Digital rectal exam (ISAEL) - Prostate specific antigen (PSA)  Annually (age 48 or over), ISAEL not paid separately when covered E/M service is provided on same date  Completed 12/2019 [facility] Seasonal Influenza Vaccination (annually)     Completed Fall 2019 
  
Recommended annually  Due Fall 2020 Pneumococcal Vaccination (once after 65)     Pneumovax - 10/2010 and 12/19 
  
Prevnar 13 - 6/2017 
  
Both recommended once over the age of 72  Completed  
  
  
Completed Hepatitis B Vaccinations (if medium/high risk)  Medium/high risk factors: End-stage renal disease, Hemophiliacs who received Factor VIII or IX concentrates, Clients of institutions for the mentally retarded, Persons who live in the same house as a HepB virus carrier, Homosexual men, Illicit injectable drug abusers. N/A  N/A   
         
         
Ultrasound Screening for Abdominal Aortic Aneurysm (AAA) (once)  Patient must be referred through IPPE and not have had a screening for abdominal aortic aneurysm before under Medicare. Limited to patients who meet one of the following criteria: 
- Men who are 73-68 years old and have smoked more than 100 cigarettes in their lifetime. 
-Anyone with a FH of AAA 
-Anyone recommended for screening by USPSTF  Completed 10/2010 - negative for AAA  Completed  
  
Please bring in a copy of your advanced directive to your next office visit so we can have a copy on file.

## 2019-12-20 NOTE — PROGRESS NOTES
After obtaining consent, and per verbal order from Dr. Sanjuanita Holstein, patient received pneumovax 23 vaccine given by Arina Chen in left Deltoid. Pneumonia Vaccine 0.5 mL IM now. Patient was observed for 10 minutes post injection. Patient tolerated injection well. VIS given.

## 2019-12-20 NOTE — PROGRESS NOTES
This is the Subsequent Medicare Annual Wellness Exam, performed 12 months or more after the Initial AWV or the last Subsequent AWV    I have reviewed the patient's medical history in detail and updated the computerized patient record. History     Patient Active Problem List   Diagnosis Code    DJD (degenerative joint disease), multiple sites M15.9    GERD (gastroesophageal reflux disease) K21.9    History of peptic ulcer disease Z87.11    BPH (benign prostatic hypertrophy) N40.0    Perennial allergic rhinitis J30.89    Vitamin D deficiency E55.9    Chronic kidney disease, stage III (moderate) ([facility] [address] N18.3    Syncope R55    Prostate cancer ([facility] [address] C61    ACP (advance care planning) Z71.89    Primary osteoarthritis of both hands M19.041, [id number]     Past Medical History:   Diagnosis Date    Arthritis     severe diffuse DJD    Calculus of kidney 1988    2 bouts    Cancer ([facility] [address]     prostate    Chronic kidney disease 2010 urinary obstruction diagnosed    due to obstructive uropathy and ? NSAID use long term    GERD (gastroesophageal reflux disease) 6/2009    with mild Chin's    Hematuria, microscopic 1970's ?    negative cysto and imaging    Other ill-defined conditions(799.89) 2006    blood transfusion hx    Perennial allergic rhinitis     mold --per Dr. Miriam Mendez    PUD (peptic ulcer disease) 2006    stomach    Syncope 8/21/2011      Past Surgical History:   Procedure Laterality Date    ENDOSCOPY, COLON, DIAGNOSTIC  2006    by [facility]; normal    HX CATARACT REMOVAL  12/28/2014    both eyes    HX COLONOSCOPY  10/22/2013    HX ENDOSCOPY      Followed by Dr. Reed Soto (multiple)    HX HEENT  1970's    septoplasty    HX ORTHOPAEDIC  2000    right rotator cuff repair    HX ORTHOPAEDIC      right knee total replacement    HX PROSTATECTOMY  2010    green light laser by Dr. Chloé Potts  5/7/13    CYSTOSCOPY WITH OPTICAL INTERNAL URETHROTOMY, AND CRYOABLATION OF PROSTATE Current Outpatient Medications   Medication Sig Dispense Refill    varicella-zoster recombinant, PF, (SHINGRIX, PF,) 50 mcg/0.5 mL susr injection 0.5mL by IntraMUSCular route once now and then repeat in 2-6 months 0.5 mL 1    lisinopril (PRINIVIL, ZESTRIL) 5 mg tablet TAKE ONE TABLET BY MOUTH DAILY 90 Tab 0    traMADol (ULTRAM) 50 mg tablet TAKE TWO TABLETS BY MOUTH THREE TIMES A  Tab 0    levocetirizine (XYZAL) 5 mg tablet TAKE ONE TABLET BY MOUTH EVERY NIGHT AT BEDTIME 90 Tab 1    albuterol sulfate 90 mcg/actuation aepb Take 2 Puffs by inhalation every four (4) hours as needed for Cough (or wheeze). Indications: wheeze or cough; please dispense with chamber/spacer 1 Inhaler 0     No Known Allergies    Family History   Problem Relation Age of Onset    Heart Disease Mother     Stroke Mother     Hypertension Mother     Arthritis-osteo Mother         neck and spine; back surgeries x5    Dementia Father         Alzheimer's type    Pneumonia Father         cause of death   [facility] Arthritis-osteo Sister     Other Son         [employer] (unsure where)    No Known Problems Daughter      Social History     Tobacco Use    Smoking status: Former Smoker     Packs/day: 2.00     Years: 10.00     Pack years: 20.00     Types: Cigarettes     Last attempt to quit: 1961     Years since quittin.9    Smokeless tobacco: Never Used    Tobacco comment: When quit - over 3ppd   Substance Use Topics    Alcohol use: No     Alcohol/week: 0.0 standard drinks     Comment: quit completely in         Depression Risk Factor Screening:     3 most recent PHQ Screens 2019   Little interest or pleasure in doing things Not at all   Feeling down, depressed, irritable, or hopeless Not at all   Total Score PHQ 2 0       Alcohol Risk Factor Screening (MALE > 65):    Do you average more 1 drink per night or more than 7 drinks a week: No    In the past three months have you have had more than 4 drinks containing alcohol on one occasion: No      Functional Ability and Level of Safety:   Hearing: Hearing is good. Activities of Daily Living: The home contains: no safety equipment. Patient does total self care    Ambulation: with no difficulty    Fall Risk:  Fall Risk Assessment, last 12 mths 12/20/2019   Able to walk? Yes   Fall in past 12 months? No       Abuse Screen:  Patient is not abused  Lives alone  Cognitive Screening   Has your family/caregiver stated any concerns about your memory: no  Cognitive Screening: Normal - Mini Cog Test    Patient Care Team   Patient Care Team:  Vern Holter, MD as PCP - General (Internal Medicine)  Vern Holter, MD as PCP - [facility] Provider  Terry Silva (Dental General Practice)  Ghada Umanzor MD (Urology)  Adis Parker MD (Orthopedic Surgery)  Chencho Mora (Dermatology)  Hilario Velasquez MD (Gastroenterology)  Charbel Morejon MD (Optometry)  updated  Assessment/Plan   Education and counseling provided:  Are appropriate based on today's review and evaluation  End-of-Life planning (with patient's consent)  Influenza Vaccine  Screening for glaucoma  Diabetes screening test    Diagnoses and all orders for this visit:    1. Medicare annual wellness visit, subsequent    2. Chronic pain syndrome  -     levocetirizine (XYZAL) 5 mg tablet; TAKE ONE TABLET BY MOUTH EVERY NIGHT AT BEDTIME    Other orders  -     varicella-zoster recombinant, PF, (SHINGRIX, PF,) 50 mcg/0.5 mL susr injection; 0.5mL by IntraMUSCular route once now and then repeat in 2-6 months        Health Maintenance Due   Topic Date Due    DTaP/Tdap/Td series (1 - Tdap) 06/17/1946    Shingrix Vaccine Age 50> (1 of 2) 06/17/1985    GLAUCOMA SCREENING Q2Y  12/29/2016    Pneumococcal 65+ years (2 of 2 - PPSV23) 11/06/2019    MEDICARE YEARLY EXAM  12/19/2019        Discussed with patient about advance medical directive. Provided patient blank AMD and Your Right to Decide Booklet.   Requested that if completed to provide a copy of AMD to office. ACP not on file. SDM is his son Jordyn Lopez).            Colonoscopy: 10/22/13, Dr. Niels King, repeat 10 years    PSA:  12/18 0.2, 12/19 0.3 annual   AAA screen: 10/10, negative    Tdap: declines further   Pneumovax 10/2010, updated 12/20/2019  Prevnar 13: 6/14/2017  Shingrix: ordered 06/13/18 $$ not getting   Flu shot: 09/20/19     Eye exam: Dr. Vilma Eisenberg annual     Lipids: 12/19 LDL 93 annual    12/16 5.7 annual due         Medication reconciliation completed by MA and reviewed by me. Medical/surgical/social/family history reviewed and updated by me. Patient provided AVS and preventative screening table. Patient verbalized understanding of all information discussed.

## 2019-12-21 LAB
BUN SERPL-MCNC: 31 MG/DL (ref 8–27)
BUN/CREAT SERPL: 20 (ref 10–24)
CALCIUM SERPL-MCNC: 9.3 MG/DL (ref 8.6–10.2)
CHLORIDE SERPL-SCNC: 102 MMOL/L (ref 96–106)
CO2 SERPL-SCNC: 21 MMOL/L (ref 20–29)
CREAT SERPL-MCNC: 1.53 MG/DL (ref 0.76–1.27)
GLUCOSE SERPL-MCNC: 86 MG/DL (ref 65–99)
POTASSIUM SERPL-SCNC: 5.2 MMOL/L (ref 3.5–5.2)
SODIUM SERPL-SCNC: 140 MMOL/L (ref 134–144)
T4 FREE SERPL-MCNC: 1.21 NG/DL (ref 0.82–1.77)
TSH SERPL DL<=0.005 MIU/L-ACNC: 0.41 UIU/ML (ref 0.45–4.5)

## 2019-12-27 LAB — DRUGS UR: NORMAL

## 2020-03-11 DIAGNOSIS — M15.9 OSTEOARTHRITIS OF MULTIPLE JOINTS, UNSPECIFIED OSTEOARTHRITIS TYPE: Primary | ICD-10-CM

## 2020-03-12 RX ORDER — TRAMADOL HYDROCHLORIDE 50 MG/1
TABLET ORAL
Qty: 540 TAB | Refills: 0 | Status: SHIPPED | OUTPATIENT
Start: 2020-03-12 | End: 2020-06-12

## 2020-03-13 NOTE — TELEPHONE ENCOUNTER
The following prescription was faxed into pt's [address] [place] [address] w/ confirmation received:     traMADoL (ULTRAM) 50 mg tablet [[id number]]     Order Details   Dose, Route, Frequency: As Directed   Dispense Quantity: 540 Tab Refills: 0 Fills remaining: --           Sig: TAKE TWO TABLETS BY MOUTH THREE TIMES A DAY          Written Date: 03/12/20 Expiration Date: --     Start Date: 03/12/20 End Date: 06/10/20            Ordering Provider: -- CORDELIA #:  -- NPI:  --    Authorizing Provider: Nirali Lyons MD CORDELIA #:  [id number] NPI:  [id number]    Ordering User:  Nirali Lyons MD            Diagnosis Association: Osteoarthritis of multiple joints, unspecified osteoarthritis type (M15.9)      Pharmacy:  [facility] [address], [address] #:  [id number]

## 2020-04-01 ENCOUNTER — TELEPHONE (OUTPATIENT)
Dept: INTERNAL MEDICINE CLINIC | Age: 85
End: 2020-04-01

## 2020-04-01 ENCOUNTER — VIRTUAL VISIT (OUTPATIENT)
Dept: INTERNAL MEDICINE CLINIC | Age: 85
End: 2020-04-01

## 2020-04-01 DIAGNOSIS — C61 PROSTATE CANCER (HCC): ICD-10-CM

## 2020-04-01 DIAGNOSIS — M15.9 OSTEOARTHRITIS OF MULTIPLE JOINTS, UNSPECIFIED OSTEOARTHRITIS TYPE: ICD-10-CM

## 2020-04-01 DIAGNOSIS — E55.9 VITAMIN D DEFICIENCY: ICD-10-CM

## 2020-04-01 DIAGNOSIS — K21.9 GASTROESOPHAGEAL REFLUX DISEASE WITHOUT ESOPHAGITIS: ICD-10-CM

## 2020-04-01 DIAGNOSIS — N18.30 CHRONIC KIDNEY DISEASE, STAGE III (MODERATE) (HCC): Primary | ICD-10-CM

## 2020-04-01 DIAGNOSIS — E05.90 SUBCLINICAL HYPERTHYROIDISM: ICD-10-CM

## 2020-04-01 DIAGNOSIS — J30.89 PERENNIAL ALLERGIC RHINITIS: ICD-10-CM

## 2020-04-01 NOTE — TELEPHONE ENCOUNTER
Called, spoke to pt  Received two pt identifiers  Pt offered and accepted virtual appt for 04/01/2020 @ [phone]  Pt is just a phone call. Pt verbalizes understanding of the instructions and has no further questions at this time.

## 2020-04-01 NOTE — PROGRESS NOTES
HISTORY OF PRESENT ILLNESS  Jorge L Oliva is a 80 y.o. male. HPI       This is an established visit completed with telemedicine was completed  the patient acknowledges and agrees to this method of visitation  15 minutes    Last here 12/19 Pt is here to f/u on chronic conditions.         BP is generally controlled  Tries to check bp at [facility] but the machine was shut down   No longer on  lisinopril 5mg daily to protect his kidneys d/t high k, stopped this lov  Discussed d/t elevated K+ and lower BP we can stop lisinopril      Wt is stable- in normal ranges --stable per his report at 169lb  He goes to the gym every day normally but now it is closed  He is taking some walks in the neighborhood      Reviewed labs   Thyroid function abnormal- discussed may be d/t tramadol  Potassium elevated - we will stop lisinopril       Previously, I referred the pt to Dr. Louisa Luciano (rheum)  However, he did not schedule an appt with this rheum  Not intertested in rheum at this time     Pt follows with Dr. Dharmesh Perez (uro) for prostate cancer  Last PSA 0.3- rising  Discussed his PSA continues to climb- discussed f/u with urology to address this,  He has not gotten an appointment yet b/c clinics are closed d/t corona    Discussed I think this is important to follow up on and though he has had prostate removed the cancer can still recur   Pt is aware and will consider scheduling follow up - we addressed this at length again today 4/20  Recall last note 3/30/16:  PSA undetectable, pt doing well no further UTI.    Recall pt's prostate is removed     Advised him to take vit D OTC 1000U daily   Pt started this but then stopped taking   Discussed he needs to continue taking this       No longer on prilosec prn for heartburn  Not having any sx      Continues on xyzol qhs for allergies - typically works well but sometimes has to take Claritin in the AM --worsening with recent pollens this helps  Recall pt is allergic to fungus and pet new     Pt uses 1 dose of lactulose prn 10ml  which helps with BMs has only had to use this once since being refilled --uses this infrequently         I give him tramadol for his back and hand pain  He gets 540 tablets q3 months- about 6 per day- this works well, controls his pain 4/20  He has been cutting back his tramadol this month  He has not been driving as much so his hand pain is improved   Pt knows not to give this to anyone else  This helps with his pain  Not sedating or overly constipationg   Pt also has percocet to use prn (rarely - took last month, bottle lasted >2 years) for more severe back pain  Pain contract signed on 06/13/19   Urine drug screen complete 12/19     Pt occasionally uses cane when his knee hurts                 ACP not on file. SDM is his son Wu Song).     Recall the patient has OA in his hands which bothers him the most, also some in the neck. He continues to take tramadol for this 6 tabs per day. Gets 540 tabs for 3 months. --i prescribe this now, we addressed this last time and he stated he still gets pain and does not think the tramadol does anything but takes it for maintenance anyway       Recall he has a dx of prostate cancer since January 2013 , sees calin annually and last saw him in January 2015--states he was released from care at that time.    Recall the patient has h/o ckd, last cr 1.5 continues to be stable. , prior was 1.77, uns 1.5-1.77  Recall that he Used to follow with rheumatologist dr Yesi Fan years ago --currently not seeing anyone. Recall the patient has hansens and a h/o stomach ulcer, sees ry for this.  Sx controlled with prilosec bid , he states he saw him last year or the year before, sx well controlled, only one or 2 breakthrough episodes.        PREVENTIVE:    Colonoscopy: 10/22/13, Dr. Chen Rivers, repeat 10 years  EGD: 4/16/16, Dr Chen Rivers, repeat 3 years  PSA: 1/13 dx'd with prostate cancer, 3/16, 12/17 0.1, 6/18 0.1, 12/18 0.2, followed by  [facility], 12/19 0.3  AAA screen: 10/10, negative  Tdap: declines further   Pneumovax 10/2010, updated 12/20/2019  Prevnar 13: 6/14/2017  Shingrix: ordered 06/13/18 $$ not getting   Flu shot: 09/20/19   Eye exam: Dr. Patty Espinal LDL 93  A1c: 6/16 5.9, 12/16 5.7  Pain contract: signed 6/2019  Urine drug screen:  c/w tramadol, 12/19                               Patient Active Problem List   Diagnosis Code    DJD (degenerative joint disease), multiple sites M15.9    GERD (gastroesophageal reflux disease) K21.9    History of peptic ulcer disease Z87.11    BPH (benign prostatic hypertrophy) N40.0    Perennial allergic rhinitis J30.89    Vitamin D deficiency E55.9    Chronic kidney disease, stage III (moderate) (HCC) N18.3    Syncope R55    Prostate cancer ([facility] [address] C61    ACP (advance care planning) Z71.89    Primary osteoarthritis of both hands M19.041, M19.042     Current Outpatient Medications   Medication Sig Dispense Refill    traMADoL (ULTRAM) 50 mg tablet TAKE TWO TABLETS BY MOUTH THREE TIMES A  Tab 0    levocetirizine (XYZAL) 5 mg tablet TAKE ONE TABLET BY MOUTH EVERY NIGHT AT BEDTIME 90 Tab 1    varicella-zoster recombinant, PF, (SHINGRIX, PF,) 50 mcg/0.5 mL susr injection 0.5mL by IntraMUSCular route once now and then repeat in 2-6 months 0.5 mL 1    albuterol sulfate 90 mcg/actuation aepb Take 2 Puffs by inhalation every four (4) hours as needed for Cough (or wheeze).  Indications: wheeze or cough; please dispense with chamber/spacer 1 Inhaler 0     Past Surgical History:   Procedure Laterality Date    ENDOSCOPY, COLON, DIAGNOSTIC  2006    by Lucy [facility]; normal    HX CATARACT REMOVAL  12/28/2014    both eyes    HX COLONOSCOPY  10/22/2013    HX ENDOSCOPY      Followed by Dr. Aakash Long (multiple)    HX HEENT  1970's    septoplasty    HX ORTHOPAEDIC  2000    right rotator cuff repair    HX ORTHOPAEDIC      right knee total replacement    HX PROSTATECTOMY  2010    green light laser by Dr. Chacorta Hastings  5/7/13    CYSTOSCOPY WITH OPTICAL INTERNAL URETHROTOMY, AND CRYOABLATION OF PROSTATE      Lab Results   Component Value Date/Time    Cholesterol, total 160 12/13/2019 10:05 AM    HDL Cholesterol 49 12/13/2019 10:05 AM    LDL, calculated 93 12/13/2019 10:05 AM    Triglyceride 91 12/13/2019 10:05 AM    CHOL/HDL Ratio 3.6 06/25/2010 08:36 AM     Lab Results   Component Value Date/Time    GFR est non-AA 41 (L) 12/20/2019 01:56 PM    GFR est AA 48 (L) 12/20/2019 01:56 PM    Creatinine 1.53 (H) 12/20/2019 01:56 PM    BUN 31 (H) 12/20/2019 01:56 PM    Sodium 140 12/20/2019 01:56 PM    Potassium 5.2 12/20/2019 01:56 PM    Chloride 102 12/20/2019 01:56 PM    CO2 21 12/20/2019 01:56 PM     Lab Results   Component Value Date/Time    TSH 0.413 (L) 12/20/2019 01:56 PM    T4, Free 1.21 12/20/2019 01:56 PM         Review of Systems   Respiratory: Negative for shortness of breath. Cardiovascular: Negative for chest pain. Physical Exam    ASSESSMENT and PLAN    ICD-10-CM ICD-9-CM    1. Chronic kidney disease, stage III (moderate) (HCC)--stable running around 1.5 will repeat labs at f/u     No longer on lisinopril d/t elevated k levels  N18.3 585.3    2. Prostate cancer ([facility] [address]      Biochemical recurrence with rising psa    He states he will set up w/ [facility] after corona C61 185    3. Osteoarthritis of multiple joints, unspecified osteoarthritis type      Stable on tramadol  Gets from me  Controls sx    Takes 6 per day     No signs of abuse. Urine drug screen utd M15.9 715.89    4. Gastroesophageal reflux disease without esophagitis--diet controlled K21.9 530.81    5. Perennial allergic rhinitis--improved with xyzol and claritin J30.89 477.8    6. Vitamin D deficiency--at goal last check on otc vit d E55.9 268.9        Current diagnosis and concerns discussed with pt at length.  Understands risks and benefits or current treatment plan and medications and accepts the treatment and medication with any possible risks.   Pt asks appropriate questions which were answered.   Pt instructed to call with any concerns or problems.

## 2020-07-16 ENCOUNTER — TELEPHONE (OUTPATIENT)
Dept: INTERNAL MEDICINE CLINIC | Age: 85
End: 2020-07-16

## 2020-07-16 DIAGNOSIS — M15.9 OSTEOARTHRITIS OF MULTIPLE JOINTS, UNSPECIFIED OSTEOARTHRITIS TYPE: Primary | ICD-10-CM

## 2020-07-16 RX ORDER — TRAMADOL HYDROCHLORIDE 50 MG/1
100 TABLET ORAL
Qty: 180 TAB | Refills: 0 | Status: SHIPPED | OUTPATIENT
Start: 2020-07-16 | End: 2020-08-15

## 2020-07-16 NOTE — TELEPHONE ENCOUNTER
Patient return call     Caller's first and last name and relationship (if not the patient): n/a       Best contact number(s): [phone]       Whose call is being returned: Returning call from nurse       Details to clarify the request: Returning call to schedule an in-office follow-up appointment.        Kimberly Pineda

## 2020-07-16 NOTE — TELEPHONE ENCOUNTER
Pt called and verified name and date of birth and pt was asked/accpted an in-person f/u visit for 08/25/2020 at 1:30pm.  Pt also states he is running out of his arthritis medication. Pt notified I will check with Dr. Alexander Garcia about that. Pt thanked us and call was ended.

## 2020-08-03 DIAGNOSIS — G89.4 CHRONIC PAIN SYNDROME: ICD-10-CM

## 2020-08-03 RX ORDER — LEVOCETIRIZINE DIHYDROCHLORIDE 5 MG/1
TABLET, FILM COATED ORAL
Qty: 90 TAB | Refills: 0 | Status: SHIPPED | OUTPATIENT
Start: 2020-08-03 | End: 2020-11-05

## 2020-08-21 NOTE — PROGRESS NOTES
HISTORY OF PRESENT ILLNESS  Tonny Menjivar is a 80 y.o. male. HPI     Last here 4/1/20 Pt is here to f/u on chronic conditions.      BP is 137/74  No longer on  lisinopril 5mg daily to protect his kidneys d/t high k, stopped this lov     Wt is 178 lb- up 10 lbs x lov     Reviewed labs   Ordered blood work and urine test      Previously, I referred the pt to Dr. Talia Neves (rheum)  However, he did not schedule an appt with this rheum  Not intertested in rheum at this time     Pt follows with Dr. Shayna Knowles (uro) for prostate cancer  Last PSA 0.3- rising  Discussed his PSA continues to climb- discussed f/u with urology to address this,  He has not gotten an appointment yet      Discussed I think this is important to follow up on and though he has had prostate removed the cancer can still recur   Pt is aware and does not want to follow up at this time 8/20  Recall last note 3/30/16:  PSA undetectable, pt doing well no further UTI.    Recall pt's prostate is removed     Advised him to take vit D OTC 1000U daily   Pt started this but then stopped taking   Discussed he needs to continue taking this       No longer on prilosec prn for heartburn  Not having any sx        Continues on xyzol qhs for allergies - typically works well but sometimes has to take Claritin in the AM --worsening with recent pollens this helps  Recall pt is allergic to fungus and pet dander     Pt uses 1 dose of lactulose prn 10ml  which helps with BMs has only had to use this once since being refilled --uses this infrequently         I give him tramadol for his back and hand pain  In the past he got 540 tablets q3 months- about 6 per day  Pt knows not to give this to anyone else  This helps with his pain  Not sedating or overly constipationg   Pt also has percocet to use prn (rarely - took last month, bottle lasted >2 years) for more severe back pain  Pain contract signed on 06/13/19-updated today8/20  Urine drug screen complete 12/19 - ordered today     He has been out of the tramadol for over a month   He thinks he still needs this but not as much as he was taking in the past   He wants to take it on the days he needs it and not on the days he doesn't   Will give enough to take 3 per day on average, but plans to not take it every day      Pt occasionally uses cane when his knee hurts                  ACP not on file. SDM is his son Claudetta Few).     Recall the patient has OA in his hands which bothers him the most, also some in the neck. He continues to take tramadol for this 6 tabs per day. Gets 540 tabs for 3 months. --i prescribe this now, we addressed this last time and he stated he still gets pain and does not think the tramadol does anything but takes it for maintenance anyway       Recall he has a dx of prostate cancer since January 2013 , sees calin annually and last saw him in January 2015--states he was released from care at that time.    Recall the patient has h/o ckd, last cr 1.5 continues to be stable. , prior was 1.77, uns 1.5-1.77  Recall that he Used to follow with rheumatologist dr Duayne Schlatter years ago --currently not seeing anyone. Recall the patient has hansens and a h/o stomach ulcer, sees ry for this.  Sx controlled with prilosec bid , he states he saw him last year or the year before, sx well controlled, only one or 2 breakthrough episodes.        PREVENTIVE:    Colonoscopy: 10/22/13, Dr. Robert Peter, repeat 10 years  EGD: 4/16/16, Dr Robert Peter, repeat 3 years, cancelled has not had any problems  PSA: 1/13 dx'd with prostate cancer, 3/16, 12/17 0.1, 6/18 0.1, 12/18 0.2, followed by Dr. Arden Teague, 12/19 0.3  AAA screen: 10/10, negative  Tdap: declines further   Pneumovax 10/2010, updated 12/20/2019  Prevnar 13: 6/14/2017  Shingrix: ordered 06/13/18 $$ not getting   Flu shot: 09/20/19   Eye exam: Dr. Karla Cisneros LDL 93  A1c: 6/16 5.9, 12/16 5.7  Pain contract: signed 6/2019 - updated today 8/25/20  Urine drug screen:  c/w tramadol, 12/19 ordered today    Patient Active Problem List    Diagnosis Date Noted    Primary osteoarthritis of both hands 06/13/2018    ACP (advance care planning) 12/11/2015    Prostate cancer ([facility] [address] 02/18/2013    Syncope 08/21/2011    Chronic kidney disease, stage III (moderate) ([facility] [address] 06/28/2011    Vitamin D deficiency 06/27/2011    DJD (degenerative joint disease), multiple sites 03/09/2010    GERD (gastroesophageal reflux disease) 03/09/2010    History of peptic ulcer disease 03/09/2010    BPH (benign prostatic hypertrophy) 03/09/2010    Perennial allergic rhinitis 03/09/2010     Current Outpatient Medications   Medication Sig Dispense Refill    albuterol sulfate (PROAIR RESPICLICK) 90 mcg/actuation breath activated inhaler Take 2 Puffs by inhalation every four (4) hours as needed for Cough (or wheeze). Indications: wheeze or cough; please dispense with chamber/spacer 1 Inhaler 0    traMADoL (ULTRAM) 50 mg tablet Take 1 Tab by mouth every six (6) hours as needed for Pain for up to 30 days.  Max Daily Amount: 150 mg. 90 Tab 2    levocetirizine (XYZAL) 5 mg tablet TAKE ONE TABLET BY MOUTH EVERY NIGHT AT BEDTIME 90 Tab 0     Past Surgical History:   Procedure Laterality Date    ENDOSCOPY, COLON, DIAGNOSTIC  2006    by [facility]; normal    HX CATARACT REMOVAL  12/28/2014    both eyes    HX COLONOSCOPY  10/22/2013    HX ENDOSCOPY      Followed by Dr. Penny Larson (multiple)    HX HEENT  1970's    septoplasty    HX ORTHOPAEDIC  2000    right rotator cuff repair    HX ORTHOPAEDIC      right knee total replacement    HX PROSTATECTOMY  2010    green light laser by Dr. Lance Johnson HX UROLOGICAL  5/7/13    CYSTOSCOPY WITH OPTICAL INTERNAL URETHROTOMY, AND CRYOABLATION OF PROSTATE      Lab Results   Component Value Date/Time    WBC 8.3 12/13/2019 10:05 AM    HGB 12.8 (L) 12/13/2019 10:05 AM    HCT 37.7 12/13/2019 10:05 AM    PLATELET 503 10/22/3522 10:05 AM    MCV 96 12/13/2019 10:05 AM     Lab Results Component Value Date/Time    Cholesterol, total 160 12/13/2019 10:05 AM    HDL Cholesterol 49 12/13/2019 10:05 AM    LDL, calculated 93 12/13/2019 10:05 AM    Triglyceride 91 12/13/2019 10:05 AM    CHOL/HDL Ratio 3.6 06/25/2010 08:36 AM     Lab Results   Component Value Date/Time    GFR est non-AA 41 (L) 12/20/2019 01:56 PM    GFR est AA 48 (L) 12/20/2019 01:56 PM    Creatinine 1.53 (H) 12/20/2019 01:56 PM    BUN 31 (H) 12/20/2019 01:56 PM    Sodium 140 12/20/2019 01:56 PM    Potassium 5.2 12/20/2019 01:56 PM    Chloride 102 12/20/2019 01:56 PM    CO2 21 12/20/2019 01:56 PM        Review of Systems   Respiratory: Negative for shortness of breath. Cardiovascular: Negative for chest pain. Physical Exam  Constitutional:       General: He is not in acute distress. Appearance: Normal appearance. He is not ill-appearing, toxic-appearing or diaphoretic. HENT:      Head: Normocephalic and atraumatic. Right Ear: External ear normal.      Left Ear: External ear normal.   Eyes:      General:         Right eye: No discharge. Left eye: No discharge. Conjunctiva/sclera: Conjunctivae normal.      Pupils: Pupils are equal, round, and reactive to light. Neck:      Musculoskeletal: Normal range of motion and neck supple. Cardiovascular:      Rate and Rhythm: Normal rate and regular rhythm. Pulses: Normal pulses. Heart sounds: Murmur (0-4/7 systolic) present. No friction rub. No gallop. Pulmonary:      Effort: No respiratory distress. Breath sounds: Normal breath sounds. No wheezing or rales. Chest:      Chest wall: No tenderness. Musculoskeletal: Normal range of motion. Right lower leg: No edema. Left lower leg: No edema. Skin:     General: Skin is warm and dry. Neurological:      Mental Status: He is alert and oriented to person, place, and time. Mental status is at baseline.       Coordination: Coordination normal.      Gait: Gait normal.   Psychiatric: Mood and Affect: Mood normal.         Behavior: Behavior normal.         ASSESSMENT and PLAN    ICD-10-CM ICD-9-CM    1. Chronic kidney disease, stage III (moderate) (HCC)         Creatinine runs around 1.4-1.5 baseline repeat labs today no longer on lisinopril due to elevated potassium levels N18.3 585.3 LIPID PANEL      METABOLIC PANEL, COMPREHENSIVE      CBC W/O DIFF      TSH 3RD GENERATION      TOXASSURE SELECT 13 (MW)   2. Prostate cancer ([facility] [address]  C61 185 LIPID PANEL   Has biochemical recurrence however he is not interested in seeing urology anymore we have discussed this at every office visit   METABOLIC PANEL, COMPREHENSIVE      CBC W/O DIFF      TSH 3RD GENERATION      TOXASSURE SELECT 13 (MW)   3. Gastroesophageal reflux disease without esophagitis  K21.9 530.81 LIPID PANEL   Diet controlled for the most part no longer using Prilosec   METABOLIC PANEL, COMPREHENSIVE      CBC W/O DIFF      TSH 3RD GENERATION      TOXASSURE SELECT 13 (MW)   4.  Primary osteoarthritis of both hands  M19.041 715.14 traMADoL (ULTRAM) 50 mg tablet    M19.042  LIPID PANEL   Has been on tramadol for many years he ran out of this 2 months ago previously was taking 6 of these per day which improved his symptoms previously was getting 540 tablets for 3 months    However since he stopped taking it his pain has not been too bad he is only needing to use it sporadically will have him use it as needed should not need more than 3/day and likely does not need it every every day    We will give 90 tablets/month updated urine drug screen and pain contract today   METABOLIC PANEL, COMPREHENSIVE      CBC W/O DIFF      TSH 3RD GENERATION      TOXASSURE SELECT 13 (MW)   5. Perennial allergic rhinitis  J30.89 477.8 LIPID PANEL   Improved with Xyzal uses albuterol as needed   METABOLIC PANEL, COMPREHENSIVE      CBC W/O DIFF      TSH 3RD GENERATION      TOXASSURE SELECT 13 (MW)   6. Drug-induced constipation  K59.03 564.09 LIPID PANEL   MiraLAX as needed has lactulose also  M837.5 METABOLIC PANEL, COMPREHENSIVE      CBC W/O DIFF      TSH 3RD GENERATION      TOXASSURE SELECT 13 (MW)   7. Vitamin D deficiency  E55.9 268.9 LIPID PANEL   OTC vitamin D   METABOLIC PANEL, COMPREHENSIVE      CBC W/O DIFF      TSH 3RD GENERATION      TOXASSURE SELECT 13 (MW)   8. Mixed hyperlipidemia  E78.2 272.2 LIPID PANEL      METABOLIC PANEL, COMPREHENSIVE      CBC W/O DIFF   Diet controlled   TSH 3RD GENERATION      TOXASSURE SELECT 13 (MW)           Scribed by Soren Galaviz of [address] [place] [address] [address], as dictated by Dr. Prudencio Kenny. Current diagnosis and concerns discussed with pt at length. Pt understands risks and benefits or current treatment plan and medications, and accepts the treatment and medication with any possible risks. Pt asks appropriate questions, which were answered. Pt was instructed to call with any concerns or problems. I have reviewed the note documented by the scribe. The services provided are my own.   The documentation is accurate

## 2020-08-25 ENCOUNTER — OFFICE VISIT (OUTPATIENT)
Dept: INTERNAL MEDICINE CLINIC | Age: 85
End: 2020-08-25
Payer: MEDICARE

## 2020-08-25 VITALS
TEMPERATURE: 98.2 F | OXYGEN SATURATION: 97 % | SYSTOLIC BLOOD PRESSURE: 137 MMHG | BODY MASS INDEX: 25.57 KG/M2 | WEIGHT: 178.6 LBS | DIASTOLIC BLOOD PRESSURE: 74 MMHG | RESPIRATION RATE: 16 BRPM | HEIGHT: 70 IN | HEART RATE: 85 BPM

## 2020-08-25 DIAGNOSIS — M19.042 PRIMARY OSTEOARTHRITIS OF BOTH HANDS: ICD-10-CM

## 2020-08-25 DIAGNOSIS — E78.2 MIXED HYPERLIPIDEMIA: ICD-10-CM

## 2020-08-25 DIAGNOSIS — K21.9 GASTROESOPHAGEAL REFLUX DISEASE WITHOUT ESOPHAGITIS: ICD-10-CM

## 2020-08-25 DIAGNOSIS — C61 PROSTATE CANCER (HCC): ICD-10-CM

## 2020-08-25 DIAGNOSIS — N18.30 CHRONIC KIDNEY DISEASE, STAGE III (MODERATE) (HCC): Primary | ICD-10-CM

## 2020-08-25 DIAGNOSIS — J30.89 PERENNIAL ALLERGIC RHINITIS: ICD-10-CM

## 2020-08-25 DIAGNOSIS — M19.041 PRIMARY OSTEOARTHRITIS OF BOTH HANDS: ICD-10-CM

## 2020-08-25 DIAGNOSIS — K59.03 DRUG-INDUCED CONSTIPATION: ICD-10-CM

## 2020-08-25 DIAGNOSIS — E55.9 VITAMIN D DEFICIENCY: ICD-10-CM

## 2020-08-25 PROCEDURE — G8419 CALC BMI OUT NRM PARAM NOF/U: HCPCS | Performed by: INTERNAL MEDICINE

## 2020-08-25 PROCEDURE — G8510 SCR DEP NEG, NO PLAN REQD: HCPCS | Performed by: INTERNAL MEDICINE

## 2020-08-25 PROCEDURE — G0444 DEPRESSION SCREEN ANNUAL: HCPCS | Performed by: INTERNAL MEDICINE

## 2020-08-25 PROCEDURE — 1101F PT FALLS ASSESS-DOCD LE1/YR: CPT | Performed by: INTERNAL MEDICINE

## 2020-08-25 PROCEDURE — 99214 OFFICE O/P EST MOD 30 MIN: CPT | Performed by: INTERNAL MEDICINE

## 2020-08-25 PROCEDURE — G8427 DOCREV CUR MEDS BY ELIG CLIN: HCPCS | Performed by: INTERNAL MEDICINE

## 2020-08-25 PROCEDURE — G8536 NO DOC ELDER MAL SCRN: HCPCS | Performed by: INTERNAL MEDICINE

## 2020-08-25 RX ORDER — TRAMADOL HYDROCHLORIDE 50 MG/1
50 TABLET ORAL
Qty: 90 TAB | Refills: 2 | Status: SHIPPED | OUTPATIENT
Start: 2020-08-25 | End: 2020-09-24

## 2020-08-25 NOTE — LETTER
Name:Anup Cintron [facility]: MR #:[id number] 2 Priya Carlisle Page 1 of 5 CONTROLLED SUBSTANCE AGREEMENT I may be prescribed medications that are controlled substances as part  of my treatment plan for management of my medical condition(s). The goal of my treatment plan is to maintain and/or improve my health and wellbeing. Because controlled substances have an increased risk of abuse or harm, continual re-evaluation is needed determine if the goals of my treatment plan are being met for my safety and the safety of others. Zoe Rajani  am entering into this Controlled Substance Agreement with my provider, __________________________________ at [facility] . I understand that successful treatment requires mutual trust and honesty between me and my provider. I understand that there are state and federal laws and regulations which apply to the medications that my provider may prescribe that must be followed. I understand there are risks and benefits ts of taking the medicines that my provider may prescribe. I understand and agree that following this Agreement is necessary in continuing my provider-patient relationship and success of my treatment plan. As a part of my treatment plan, I agree to the following: COMMUNICATION: 
 
1. I will communicate fully with my provider about my medical condition(s), including the effect on my daily life and how well my medications are helping. I will tell my provider all of the medications that I take for any reason, including medications I receive from another health care provider, and will notify my provider about all issues, problems or concerns, including any side effects, which may be related to my medications. I understand that this information allows my provider to adjust my treatment plan to help manage my medical condition.  I understand that this information will become part of my permanent medical record. 2. I will notify my provider if I have a history of alcohol/drug misuse/addiction or if I have had treatment for alcohol/drug addiction in the past, or if I have a new problem with or concern about alcohol/drug use/addiction, because this increases the likelihood of high risk behaviors and may lead to serious medical conditions. 3. Females Only: I will notify my provider if I am or become pregnant, or if I intend to become pregnant, or if I intend to breastfeed. I understand that communication of these issues with my provider is important, due to possible effects my medication could have on an unborn fetus or breastfeeding child. Initials_____ Name:.Regan Majano LPW:[id number] MR #:[id number] 2 Priya Carlisle Page 2 of 5 MISUSE OF MEDICATIONS / DRUGS: 
 
1. I agree to take all controlled substances as prescribed, and will not misuse or abuse any controlled substances prescribed by my provider. For my safety, I will not increase the amount of medicine I take without first talking with and getting permission from my provider. 2. If I have a medical emergency, another health care provider may prescribe me medication. If I seek emergency treatment, I will notify my provider within seventy-two (72) hours. 3. I understand that my provider may discuss my use and/or possible misuse/abuse of controlled substances and alcohol, as appropriate, with any health care provider involved in my care, pharmacist or legal authority. ILLEGAL DRUGS: 
 
1. I will not use illegal drugs of any kind, including but not limited to marijuana, heroin, cocaine, or any prescription drug which is not prescribed to me. DRUG DIVERSION / PRESCRIPTION FRAUD: 
 
1. I will not share, sell, trade, give away, or otherwise misuse my prescriptions or medications. 2. I will not alter any prescriptions provided to me by my provider. SINGLE PROVIDER: 
 
1. I agree that all controlled substances that I take will be prescribed only by my provider (or his/her covering provider) under this Agreement. This agreement does not prevent me from seeking emergency medical treatment or receiving pain management related to a surgery. PROTECTING MEDICATIONS: 
 
1. I am responsible for keeping my prescriptions and medications in a safe and secure place including safeguarding them from loss or theft. I understand that lost, stolen or damaged/destroyed prescriptions or medications will not be replaced. Initials____ Name:.Regan Church WKP:[id number]75 MR #:[id number] 2 Priya Carlisle Page 3 of 5 PRESCRIPTION RENEWALS/REFILLS: 
 
1. I will follow my controlled substance medication schedule as prescribed by my provider. 2. I understand and agree that I will make any requests for renewals or refills of my prescriptions only at the time of an office visit or during my providers regular office hours subject to the prescription refill requirements of the individual practice. 3. I understand that my provider may not call in prescriptions for controlled substances to my pharmacy. 4. I understand that my provider may adjust or discontinue these medications as deemed appropriate for my medical treatment plan. This Agreement does not guarantee the prescription of controlled medications. 5. I agree that if my medications are adjusted or discontinued, I will properly dispose of any remaining medications. I understand that I will be required to dispose of any remaining controlled medications prior to being provided with any prescriptions for other controlled medications. 6. I understand that the renewal of my prescription depends on my medical condition, my consistent participation, and my adherence with my treatment plan and this Agreement.  
 
7. I understand that if I do not keep an appointment with my provider, I may not receive a renewal or refill for my controlled substance medication. PRESCRIPTION MONITORING / DRUG TESTIN. I understand that my provider may require me to provide urine, saliva or blood for testing at any time. I understand that this testing will be used to monitor for safety and adherence with my treatment plan and this Agreement. 2. I understand that my provider may ask me to provide an observed urine specimen, which means that a nurse or other health care provider may watch me provide urine, and I agree to cooperate if I am asked to provide an observed specimen. 3. I understand that if I do not provide urine, saliva or blood samples within two (2) hours of my providers request, or other timeframe decided by my provider, my treatment plan could be changed, or my prescriptions and medications may be changed or ended. 4. I understand that urine, saliva and blood test results will be a part of my permanent medical record. Initials_____ Name:Anup Gagnon QQ: MR #:[id number] 2 Priya Carlisle Page 4 of 5 
 
5. I understand that my provider is required to obtain a copy of my State Prescription Monitoring Program () Report at any time in order to safely prescribe medications. 6. I will bring all of my prescribed controlled substance medications in their original bottles to all of my scheduled appointments. 7. I understand that my provider may ask me to come to the practice with all of my prescribed medications for a random pill count at any time. I agree to cooperate if I am asked to come in for a random pill count. I understand that if I do not arrive in the timeframe decided by my provider, my treatment plan could be changed, or my prescriptions and medications may be changed or ended.  
 
COOPERATION WITH INVESTIGATIONS: 
 
1. I authorize my provider and my pharmacy to cooperate fully with any local, state, or federal law enforcement agency in the investigation of any possible misuse, sale, or other diversion of my controlled substance prescriptions or medications. RISKS: 
 
1. I understand that my level of consciousness may be affected from the use of controlled substances, and I understand that there are risks, benefits, effects and potential alternatives (including no treatment) to the medications that my provider has prescribed. 2. I understand that I may become drowsy, tired, dizzy, constipated, and sick to my stomach, or have changes in my mood or in my sleep while taking my medications. I have talked with my provider about these possible side effects, risks, benefits, and alternative treatments, and my provider has answered all of my questions. 3. I understand that I should not suddenly stop taking my medications without first speaking with my provider. I understand that if I suddenly stop taking my medications, I may experience nausea, vomiting, sweating,anxiety, sleeplessness, itching or other uncomfortable feelings. 4. I will not take my medications with alcohol of any kind, including beer, wine or liquor. I understand that drinking alcohol with my medications increases the chances of side effects, including breathing problems or even death. 5. I understand that if I have a history of alcoholism or other drug addiction I may be at increased risk of addiction to my medications. Signs of addiction might include craving, compulsive use, and continued use despite harm. Since addiction is a disease, I understand my provider may decide to change my medications and refer me to appropriate treatment services. I understand that this information would become part of my permanent medical record. Initials_____ Name:Anup Loyd DBO:[birth year] MR #:[id number] 2 Priya Carlisle Page 5 of 5  
 
 
 
1. I understand that if I do not adhere to this Agreement in any way, my provider may change my prescriptions, stop prescribing controlled substances or end our provider-patient relationship. 2. If my provider decides to stop prescribing medication, or decides to end our provider-patient relationship,my provider may require that I taper my medications slowly. If necessary, my provider may also provide a prescription for other medications to treat my withdrawal symptoms. UNDERSTANDING THIS AGREEMENT: 
 
I understand that my provider may adjust or stop my prescriptions for controlled substances based on my medical condition and my treatment plan. I understand that this Agreement does not guarantee that I will be prescribed medications or controlled substances. I understand that controlled substances may be just one part 
of my treatment plan. My initial on each page and my signature below shows that I have read each page of this Agreement, I have had an opportunity to ask questions, and all of my questions have been answered to my satisfaction by my provider. By signing below, I agree to comply with this Agreement, and I understand that if I do not follow the Agreements listed above, my provider may stop 
 
_________________________________________  Date/Time 8/25/2020 1:33 PM   
             (Patient Signature) 
 
________________________________________    Date/Time 8/25/2020 1:33 PM 
 (Parent or Guardian Signature if <18 yrs) 
 
_________________________________________ Date/Time 8/25/2020 1:33 PM 
 (Provider Signature)

## 2020-08-30 LAB
ALBUMIN SERPL-MCNC: 4.3 G/DL (ref 3.6–4.6)
ALBUMIN/GLOB SERPL: 1.7 {RATIO} (ref 1.2–2.2)
ALP SERPL-CCNC: 80 IU/L (ref 39–117)
ALT SERPL-CCNC: 16 IU/L (ref 0–44)
AST SERPL-CCNC: 23 IU/L (ref 0–40)
BILIRUB SERPL-MCNC: 0.8 MG/DL (ref 0–1.2)
BUN SERPL-MCNC: 38 MG/DL (ref 8–27)
BUN/CREAT SERPL: 20 (ref 10–24)
CALCIUM SERPL-MCNC: 9.3 MG/DL (ref 8.6–10.2)
CHLORIDE SERPL-SCNC: 102 MMOL/L (ref 96–106)
CHOLEST SERPL-MCNC: 199 MG/DL (ref 100–199)
CO2 SERPL-SCNC: 21 MMOL/L (ref 20–29)
CREAT SERPL-MCNC: 1.92 MG/DL (ref 0.76–1.27)
DRUGS UR: NORMAL
ERYTHROCYTE [DISTWIDTH] IN BLOOD BY AUTOMATED COUNT: 12.3 % (ref 11.6–15.4)
GLOBULIN SER CALC-MCNC: 2.5 G/DL (ref 1.5–4.5)
GLUCOSE SERPL-MCNC: 100 MG/DL (ref 65–99)
HCT VFR BLD AUTO: 44.7 % (ref 37.5–51)
HDLC SERPL-MCNC: 46 MG/DL
HGB BLD-MCNC: 14.7 G/DL (ref 13–17.7)
LDLC SERPL CALC-MCNC: 131 MG/DL (ref 0–99)
MCH RBC QN AUTO: 33.4 PG (ref 26.6–33)
MCHC RBC AUTO-ENTMCNC: 32.9 G/DL (ref 31.5–35.7)
MCV RBC AUTO: 102 FL (ref 79–97)
PLATELET # BLD AUTO: 213 X10E3/UL (ref 150–450)
POTASSIUM SERPL-SCNC: 5 MMOL/L (ref 3.5–5.2)
PROT SERPL-MCNC: 6.8 G/DL (ref 6–8.5)
RBC # BLD AUTO: 4.4 X10E6/UL (ref 4.14–5.8)
SODIUM SERPL-SCNC: 142 MMOL/L (ref 134–144)
TRIGL SERPL-MCNC: 112 MG/DL (ref 0–149)
TSH SERPL DL<=0.005 MIU/L-ACNC: 0.56 UIU/ML (ref 0.45–4.5)
VLDLC SERPL CALC-MCNC: 22 MG/DL (ref 5–40)
WBC # BLD AUTO: 7.7 X10E3/UL (ref 3.4–10.8)

## 2020-08-31 NOTE — PROGRESS NOTES
Please call patient back about results  Pt needs 3 month f/u--can be phone call vv--d/t tramadol, 2/20 is 6 mo too far d/t controlled substance    Cr off from baseline--make sure he is not taking any motrin/aleve  Repeat bmp in 2-3 weeks to ensure stable

## 2020-09-02 DIAGNOSIS — R79.89 ELEVATED SERUM CREATININE: Primary | ICD-10-CM

## 2020-09-02 NOTE — PROGRESS NOTES
Called, spoke to pt. Two pt identifiers confirmed. Pt informed per Dr. Ara Baig that Cr was off from baseline; Repeat bmp in 2-3 weeks to ensure stable--Labs ordered and mailed to pt. Pt states not taking aleve/motrin. Informed pt 3mo f/u needed for tramadol being controlled. Pt offered/accepted Virtual appt for 11/17/20 at 1145. Informed pt must have access to a smartphone and/or a computer w/ a camera and a microphone. Pt verbalized understanding of information discussed w/ no further questions at this time.

## 2020-09-09 LAB
BUN SERPL-MCNC: 38 MG/DL (ref 8–27)
BUN/CREAT SERPL: 21 (ref 10–24)
CALCIUM SERPL-MCNC: 9.2 MG/DL (ref 8.6–10.2)
CHLORIDE SERPL-SCNC: 107 MMOL/L (ref 96–106)
CO2 SERPL-SCNC: 22 MMOL/L (ref 20–29)
CREAT SERPL-MCNC: 1.82 MG/DL (ref 0.76–1.27)
GLUCOSE SERPL-MCNC: 93 MG/DL (ref 65–99)
POTASSIUM SERPL-SCNC: 5.1 MMOL/L (ref 3.5–5.2)
SODIUM SERPL-SCNC: 145 MMOL/L (ref 134–144)

## 2020-09-16 DIAGNOSIS — R79.89 ELEVATED SERUM CREATININE: ICD-10-CM

## 2020-11-05 DIAGNOSIS — G89.4 CHRONIC PAIN SYNDROME: ICD-10-CM

## 2020-11-05 RX ORDER — LEVOCETIRIZINE DIHYDROCHLORIDE 5 MG/1
TABLET, FILM COATED ORAL
Qty: 90 TAB | Refills: 0 | Status: SHIPPED | OUTPATIENT
Start: 2020-11-05 | End: 2021-02-05

## 2020-11-16 NOTE — PROGRESS NOTES
HISTORY OF PRESENT ILLNESS  Tushar Garcia is a 80 y.o. male. HPI     Last here 8/25/20 Pt is here to f/u on chronic conditions.   This is an established visit completed with telemedicine was completed   the patient acknowledges and agrees to this method of visitation     17min    No home BP readings   No longer on  lisinopril 5mg daily to protect his kidneys d/t high k      Wt is 171 lbs per pt - down 7 lbs x lov   He has been eating less     Reviewed labs      Previously, I referred the pt to Dr. Jazzy Chen (rheum)  However, he did not schedule an appt with this rheum  Not intertested in rheum at this time     Pt follows with Dr. Dinorah Sanchez (uro) for prostate cancer  Last PSA 0.3- rising  Discussed I think this is important to follow up on and though he has had prostate removed the cancer can still recur - DOES NOT WANT TO TALK ABOUT THIS ANYMORE  Pt is aware and does not want to follow up at this time 11/20  Recall last note 3/30/16:  PSA undetectable, pt doing well no further UTI.    Recall pt's prostate is removed     Advised him to take vit D OTC 1000U daily   Pt started this but then stopped taking        No longer on prilosec prn for heartburn  Not having any sx      Continues on xyzol qhs for allergies - typically works well but sometimes has to take Claritin in the AM --worsening with recent pollens this helps  Recall pt is allergic to fungus and pet dander     Pt uses 1 dose of lactulose prn 10ml  which helps with BMs  --uses this infrequently -- no issues recently      I give him tramadol for his back and hand pain  In the past he got 540 tablets q3 months- about 6 per day  He takes around 2 per day, occasionally 3 per day  Pt knows not to give this to anyone else  This helps with his pain  Not sedating or overly constipationg   Pt also has percocet to use prn (rarely - takes, bottle lasted >1 years) for more severe back pain - needs refill   Pain contract signed on 8/20  Urine drug screen complete 8/27/20     Pt occasionally uses cane when his knee hurts     He wonders about vaccine  Discussed once it's available, will let him know     He wonders if he can drink coffee in the morning - discussed this is okay      ACP not on file. SDM is his son Kay Carmona).     Recall the patient has OA in his hands which bothers him the most, also some in the neck. He continues to take tramadol for this 6 tabs per day. Gets 540 tabs for 3 months. --i prescribe this now, we addressed this last time and he stated he still gets pain and does not think the tramadol does anything but takes it for maintenance anyway       Recall he has a dx of prostate cancer since January 2013 , sees calin annually and last saw him in January 2015--states he was released from care at that time.    Recall the patient has h/o ckd, last cr 1.5 continues to be stable. , prior was 1.77, uns 1.5-1.77  Recall that he Used to follow with rheumatologist dr Jax Izquierdo years ago --currently not seeing anyone. Recall the patient has hansens and a h/o stomach ulcer, sees ry for this.  Sx controlled with prilosec bid , he states he saw him last year or the year before, sx well controlled, only one or 2 breakthrough episodes.        PREVENTIVE:    Colonoscopy: 10/22/13, Dr. Aquiles Nunn, repeat 10 years  EGD: 4/16/16, Dr Aquiles Nunn, repeat 3 years, cancelled has not had any problems  PSA: 1/13 dx'd with prostate cancer, 3/16, 12/17 0.1, 6/18 0.1, 12/18 0.2, followed by Dr. Ana Maria Bolton, 12/19 0.3  AAA screen: 10/10, negative  Tdap: declines further   Pneumovax: 12/20/2019  Prevnar 13: 6/14/2017  Shingrix: ordered 06/13/18,due will get at pharm    Flu shot: 11/20  Eye exam: Dr. Rivera Deysi   A1c: 6/16 5.9, 12/16 5.7  Pain contract: signed 8/25/20  Urine drug screen:  c/w tramadol, 8/27/20    Patient Active Problem List    Diagnosis Date Noted    Primary osteoarthritis of both hands 06/13/2018    Prostate cancer ([facility] [address] 02/18/2013    Chronic kidney disease, stage III (moderate) 06/28/2011    Vitamin D deficiency 06/27/2011    DJD (degenerative joint disease), multiple sites 03/09/2010    GERD (gastroesophageal reflux disease) 03/09/2010    History of peptic ulcer disease 03/09/2010    BPH (benign prostatic hypertrophy) 03/09/2010    Perennial allergic rhinitis 03/09/2010     Current Outpatient Medications   Medication Sig Dispense Refill    oxyCODONE-acetaminophen (PERCOCET 7.5) 7.5-325 mg per tablet Take 1 Tab by mouth every eight (8) hours as needed for Pain for up to 7 days. Max Daily Amount: 3 Tabs. 20 Tab 0    levocetirizine (XYZAL) 5 mg tablet TAKE ONE TABLET BY MOUTH EVERY NIGHT AT BEDTIME 90 Tab 0    albuterol sulfate (PROAIR RESPICLICK) 90 mcg/actuation breath activated inhaler Take 2 Puffs by inhalation every four (4) hours as needed for Cough (or wheeze).  Indications: wheeze or cough; please dispense with chamber/spacer 1 Inhaler 0     Past Surgical History:   Procedure Laterality Date    ENDOSCOPY, COLON, DIAGNOSTIC  2006    [facility]; normal    HX CATARACT REMOVAL  12/28/2014    both eyes    HX COLONOSCOPY  10/22/2013    HX ENDOSCOPY      Followed by Dr. Aj Jaquez (multiple)    HX HEENT  1970's    septoplasty    HX ORTHOPAEDIC  2000    right rotator cuff repair    HX ORTHOPAEDIC      right knee total replacement    HX PROSTATECTOMY  2010    green light laser by Dr. Naomi Licona HX UROLOGICAL  5/7/13    CYSTOSCOPY WITH OPTICAL INTERNAL URETHROTOMY, AND CRYOABLATION OF PROSTATE      Lab Results   Component Value Date/Time    WBC 7.7 08/27/2020 10:36 AM    HGB 14.7 08/27/2020 10:36 AM    HCT 44.7 08/27/2020 10:36 AM    PLATELET 954 39/83/3517 10:36 AM     (H) 08/27/2020 10:36 AM     Lab Results   Component Value Date/Time    Cholesterol, total 199 08/27/2020 10:36 AM    HDL Cholesterol 46 08/27/2020 10:36 AM    LDL, calculated 131 (H) 08/27/2020 10:36 AM    Triglyceride 112 08/27/2020 10:36 AM    CHOL/HDL Ratio 3.6 06/25/2010 08:36 AM     Lab Results   Component Value Date/Time    GFR est non-AA 33 (L) 09/08/2020 10:22 AM    GFR est AA 38 (L) 09/08/2020 10:22 AM    Creatinine 1.82 (H) 09/08/2020 10:22 AM    BUN 38 (H) 09/08/2020 10:22 AM    Sodium 145 (H) 09/08/2020 10:22 AM    Potassium 5.1 09/08/2020 10:22 AM    Chloride 107 (H) 09/08/2020 10:22 AM    CO2 22 09/08/2020 10:22 AM        Review of Systems   Respiratory: Negative for shortness of breath. Cardiovascular: Negative for chest pain. Physical Exam    ASSESSMENT and PLAN    ICD-10-CM ICD-9-CM    1. Stage 3a chronic kidney disease             Cr 1.6-1.8 baseline stable last check continue to monitor N18.31 585.3    2. Prostate cancer [facility])           Not interested in further eval depsite biochemical occurrence  C61 185    3. Gastroesophageal reflux disease without esophagitis         Diet controlled continue K21.9 530.81    4. History of peptic ulcer disease         Diet controlled Z87.11 V12.71    5. Osteoarthritis of multiple joints, unspecified osteoarthritis type           ets tramadol from me now decreased amount 2-3 per day      Give percocet for breakthrough very rare use    Will refill    Check     Urine drug screen utd    No sign of abuse M15.9 715.89 oxyCODONE-acetaminophen (PERCOCET 7.5) 7.5-325 mg per tablet   6. Seasonal allergic rhinitis due to pollen         Improve with xyzol J30.1 477.0            Scribed by Isha Mitchell, as dictated by Dr. Anjelica Oropeza. Current diagnosis and concerns discussed with pt at length. Pt understands risks and benefits or current treatment plan and medications, and accepts the treatment and medication with any possible risks. Pt asks appropriate questions, which were answered. Pt was instructed to call with any concerns or problems. I have reviewed the note documented by the scribe. The services provided are my own.   The documentation is accurate     Annamarie Dubose, who was evaluated through a synchronous (real-time) audio only encounter, and/or his healthcare decision maker, is aware that it is a billable service, with coverage as determined by his insurance carrier. He provided verbal consent to proceed: Yes, and patient identification was verified. It was conducted pursuant to the emergency declaration under the [address] [place] [address] and the [employer] and [employer] General Act. A caregiver was present when appropriate. Ability to conduct physical exam was limited. I was in the office. The patient was at home.

## 2020-11-17 ENCOUNTER — VIRTUAL VISIT (OUTPATIENT)
Dept: INTERNAL MEDICINE CLINIC | Age: 85
End: 2020-11-17
Payer: MEDICARE

## 2020-11-17 DIAGNOSIS — K21.9 GASTROESOPHAGEAL REFLUX DISEASE WITHOUT ESOPHAGITIS: ICD-10-CM

## 2020-11-17 DIAGNOSIS — M15.9 OSTEOARTHRITIS OF MULTIPLE JOINTS, UNSPECIFIED OSTEOARTHRITIS TYPE: ICD-10-CM

## 2020-11-17 DIAGNOSIS — N18.31 STAGE 3A CHRONIC KIDNEY DISEASE (HCC): Primary | ICD-10-CM

## 2020-11-17 DIAGNOSIS — Z87.11 HISTORY OF PEPTIC ULCER DISEASE: ICD-10-CM

## 2020-11-17 DIAGNOSIS — C61 PROSTATE CANCER (HCC): ICD-10-CM

## 2020-11-17 DIAGNOSIS — J30.1 SEASONAL ALLERGIC RHINITIS DUE TO POLLEN: ICD-10-CM

## 2020-11-17 PROCEDURE — 99442 PR PHYS/QHP TELEPHONE EVALUATION 11-20 MIN: CPT | Performed by: INTERNAL MEDICINE

## 2020-11-17 RX ORDER — OXYCODONE AND ACETAMINOPHEN 7.5; 325 MG/1; MG/1
1 TABLET ORAL
Qty: 20 TAB | Refills: 0 | Status: SHIPPED | OUTPATIENT
Start: 2020-11-17 | End: 2020-11-24

## 2021-01-21 DIAGNOSIS — M15.9 OSTEOARTHRITIS OF MULTIPLE JOINTS, UNSPECIFIED OSTEOARTHRITIS TYPE: Primary | ICD-10-CM

## 2021-01-23 RX ORDER — TRAMADOL HYDROCHLORIDE 50 MG/1
TABLET ORAL
Qty: 90 TAB | Refills: 1 | Status: SHIPPED | OUTPATIENT
Start: 2021-01-23 | End: 2021-04-22

## 2021-01-23 RX ORDER — ALBUTEROL SULFATE 90 UG/1
POWDER, METERED RESPIRATORY (INHALATION)
Qty: 1 INHALER | Refills: 0 | Status: SHIPPED | OUTPATIENT
Start: 2021-01-23 | End: 2021-12-01

## 2021-02-04 DIAGNOSIS — G89.4 CHRONIC PAIN SYNDROME: ICD-10-CM

## 2021-02-05 RX ORDER — LEVOCETIRIZINE DIHYDROCHLORIDE 5 MG/1
TABLET, FILM COATED ORAL
Qty: 90 TAB | Refills: 0 | Status: SHIPPED | OUTPATIENT
Start: 2021-02-05 | End: 2021-05-14

## 2021-02-22 NOTE — PROGRESS NOTES
HISTORY OF PRESENT ILLNESS  Merary Wang is a 80 y.o. male. HPI     Last here 20 Pt is here to f/u on chronic conditions.      BP today is 139/74  No longer on  lisinopril 5mg daily to protect his kidneys d/t high k      Wt is 182 lbs - up 11 lbs x lov    He has been eating less     Reviewed labs   Ordered labs      Previously, I referred the pt to Dr. Nora Sequeira (rheum)  However, he did not schedule an appt with this rheum  Not intertested in rheum at this time     Pt follows with Dr. Ingrid Hester (uro) for prostate cancer  Last PSA 0.3- rising  Discussed I think this is important to follow up on and though he has had prostate removed the cancer can still recur - DOES NOT WANT TO TALK ABOUT THIS ANYMORE  Pt is aware and does not want to follow up at this time   Recall last note 3/30/16:  PSA undetectable, pt doing well no further UTI.    Recall pt's prostate is removed     Advised him to take vit D OTC 1000U daily   Pt started this but then stopped taking        No longer on prilosec prn for heartburn  Takes pepcid prn      Continues on xyzol qhs for allergies - typically works well but sometimes has to take Claritin in the AM --worsening with recent pollens this helps  Recall pt is allergic to fungus and pet dander     Pt uses 1 dose of lactulose prn 10ml  which helps with BMs  --uses this infrequently -- no issues recently      I give him tramadol for his back and hand pain  Now he is getting 90 tablets q3 months- about 2-3 per day, this was decreased from previous 6 per day  Pt knows not to give this to anyone else  This helps with his pain  Not sedating or overly constipationg   Pt also has percocet to use prn (rarely - takes, bottle lasted >1 years) for more severe back pain - needs refill   Pain contract signed on   Urine drug screen complete 20, will order to get done with bloodwork     Pt occasionally uses cane when his knee hurts     Discussed covid vaccine      Has safety equip, doesn't want hearing checked  Pt lives by himself     Pt is functionally independent       No memory concerns   Knows the month, date, year, location   Can recall 2/3 objects      ACP not on file. SDM is his son Ortega José).     Recall the patient has OA in his hands which bothers him the most, also some in the neck. He continues to take tramadol for this 6 tabs per day. Gets 540 tabs for 3 months. --i prescribe this now, we addressed this last time and he stated he still gets pain and does not think the tramadol does anything but takes it for maintenance anyway       Recall he has a dx of prostate cancer since January 2013 , sees calin annually and last saw him in January 2015--states he was released from care at that time.    Recall the patient has h/o ckd, last cr 1.5 continues to be stable. , prior was 1.77, uns 1.5-1.77  Recall that he Used to follow with rheumatologist dr Nitin Littlejohn years ago --currently not seeing anyone. Recall the patient has hansens and a h/o stomach ulcer, sees ry for this.  Sx controlled with prilosec bid , he states he saw him last year or the year before, sx well controlled, only one or 2 breakthrough episodes.        PREVENTIVE:    Colonoscopy: 10/22/13, Dr. Tony Walker, repeat 10 years  EGD: 4/16/16, Dr Tony Walker, repeat 3 years, cancelled has not had any problems  PSA: 1/13 dx'd with prostate cancer, 3/16, 12/17 0.1, 6/18 0.1, 12/18 0.2, followed by Dr. Charles Rai, 12/19 0.3  AAA screen: 10/10, negative  Tdap: declines further   Pneumovax: 12/20/2019  Prevnar 13: 6/14/2017  Shingrix: ordered 06/13/18,due will get at pharm    Flu shot: 11/20  Eye exam: Dr. Prosper Landin, scheduled 3/21  Lipids: 8/20   A1c: 6/16 5.9, 12/16 5.7  Pain contract: signed 8/25/20  Urine drug screen:  c/w tramadol, 8/27/20, ordered    Patient Active Problem List    Diagnosis Date Noted    Primary osteoarthritis of both hands 06/13/2018    Prostate cancer ([facility] [address] 02/18/2013    Chronic kidney disease, stage III (moderate) 06/28/2011    Vitamin D deficiency 06/27/2011    DJD (degenerative joint disease), multiple sites 03/09/2010    GERD (gastroesophageal reflux disease) 03/09/2010    History of peptic ulcer disease 03/09/2010    Benign prostatic hyperplasia 03/09/2010    Perennial allergic rhinitis 03/09/2010     Current Outpatient Medications   Medication Sig Dispense Refill    levocetirizine (XYZAL) 5 mg tablet TAKE ONE TABLET BY MOUTH EVERY NIGHT AT BEDTIME 90 Tab 0    ProAir RespiClick 90 mcg/actuation breath activated inhaler INHALE TWO PUFFS BY MOUTH EVERY 4 HOURS AS NEEDED FOR WHEEZING OR COUGH 1 Inhaler 0     Past Surgical History:   Procedure Laterality Date    ENDOSCOPY, COLON, DIAGNOSTIC  2006    by Lucy IRELAND; normal    HX CATARACT REMOVAL  12/28/2014    both eyes    HX COLONOSCOPY  10/22/2013    HX ENDOSCOPY      Followed by Dr. Tony Walker (multiple)    HX HEENT  1970's    septoplasty    HX ORTHOPAEDIC  2000    right rotator cuff repair    HX ORTHOPAEDIC      right knee total replacement    HX PROSTATECTOMY  2010    green light laser by Dr. Demi Lin HX UROLOGICAL  5/7/13    CYSTOSCOPY WITH OPTICAL INTERNAL URETHROTOMY, AND CRYOABLATION OF PROSTATE      Lab Results   Component Value Date/Time    WBC 7.7 08/27/2020 10:36 AM    HGB 14.7 08/27/2020 10:36 AM    HCT 44.7 08/27/2020 10:36 AM    PLATELET 364 32/38/9181 10:36 AM     (H) 08/27/2020 10:36 AM     Lab Results   Component Value Date/Time    Cholesterol, total 199 08/27/2020 10:36 AM    HDL Cholesterol 46 08/27/2020 10:36 AM    LDL, calculated 131 (H) 08/27/2020 10:36 AM    Triglyceride 112 08/27/2020 10:36 AM    CHOL/HDL Ratio 3.6 06/25/2010 08:36 AM     Lab Results   Component Value Date/Time    GFR est non-AA 33 (L) 09/08/2020 10:22 AM    GFR est AA 38 (L) 09/08/2020 10:22 AM    Creatinine 1.82 (H) 09/08/2020 10:22 AM    BUN 38 (H) 09/08/2020 10:22 AM    Sodium 145 (H) 09/08/2020 10:22 AM    Potassium 5.1 09/08/2020 10:22 AM Chloride 107 (H) 09/08/2020 10:22 AM    CO2 22 09/08/2020 10:22 AM        Review of Systems   Respiratory: Negative for shortness of breath. Cardiovascular: Negative for chest pain. Physical Exam  Constitutional:       General: He is not in acute distress. Appearance: Normal appearance. He is not ill-appearing, toxic-appearing or diaphoretic. HENT:      Head: Normocephalic and atraumatic. Right Ear: External ear normal.      Left Ear: External ear normal.   Eyes:      General:         Right eye: No discharge. Left eye: No discharge. Conjunctiva/sclera: Conjunctivae normal.      Pupils: Pupils are equal, round, and reactive to light. Neck:      Musculoskeletal: Normal range of motion and neck supple. Vascular: No carotid bruit. Cardiovascular:      Rate and Rhythm: Normal rate and regular rhythm. Pulses: Normal pulses. Heart sounds: Normal heart sounds. No murmur. No friction rub. No gallop. Pulmonary:      Effort: No respiratory distress. Breath sounds: Normal breath sounds. No wheezing or rales. Chest:      Chest wall: No tenderness. Musculoskeletal: Normal range of motion. Right lower leg: No edema. Left lower leg: No edema. Skin:     General: Skin is warm and dry. Neurological:      Mental Status: He is alert and oriented to person, place, and time. Mental status is at baseline. Coordination: Coordination normal.      Gait: Gait normal.   Psychiatric:         Mood and Affect: Mood normal.         Behavior: Behavior normal.         ASSESSMENT and PLAN    ICD-10-CM ICD-9-CM    1. Medicare annual wellness visit, subsequent  Z17.91 Z79.9 METABOLIC PANEL, COMPREHENSIVE      CBC W/O DIFF      TSH 3RD GENERATION   2.  Prostate cancer [facility])         Discussed biochemical recurrence patient is not interested in any further evaluation discussed this on multiple occasions     P72 416 METABOLIC PANEL, COMPREHENSIVE      CBC W/O DIFF      TSH 3RD GENERATION   3. Osteoarthritis of multiple joints, unspecified osteoarthritis type  B94.5 197.44 METABOLIC PANEL, COMPREHENSIVE      CBC W/O DIFF   Takes tramadol now just generally 2/day up to 3/day 90 tablets/month continue works well    Ordered urine drug screen     will be reviewed we will review pain contract in August       [facility] 3RD GENERATION   4. Gastroesophageal reflux disease without esophagitis  O71.1 950.95 METABOLIC PANEL, COMPREHENSIVE      CBC W/O DIFF   For the most part diet control uses over-the-counter Pepcid as needed   [facility] 3RD GENERATION   5. Perennial allergic rhinitis  Z79.36 660.2 METABOLIC PANEL, COMPREHENSIVE   Controlled on Xyzal   CBC W/O DIFF      [facility] 3RD GENERATION   6. Benign prostatic hyperplasia without lower urinary tract symptoms  R52.6 802.74 METABOLIC PANEL, COMPREHENSIVE      CBC W/O DIFF   Not requiring any medication for this   [facility] 3RD GENERATION   7. Stage 3a chronic kidney disease  W93.40 462.3 METABOLIC PANEL, COMPREHENSIVE   Creatinine runs around 1.5-1.7 baseline repeat BMP continue to monitor has been stable   CBC W/O DIFF      [facility] 3RD GENERATION   8. Vitamin D deficiency  K30.9 039.5 METABOLIC PANEL, COMPREHENSIVE      CBC W/O DIFF   Continue OTC vitamin D   TSH 3RD GENERATION      Depression screen reviewed and negative      Scribed by Devyn Dailey, as dictated by Dr. Mellisa Chong. Current diagnosis and concerns discussed with pt at length. Pt understands risks and benefits or current treatment plan and medications, and accepts the treatment and medication with any possible risks. Pt asks appropriate questions, which were answered. Pt was instructed to call with any concerns or problems. I have reviewed the note documented by the scribe. The services provided are my own.   The documentation is accurate

## 2021-02-23 ENCOUNTER — OFFICE VISIT (OUTPATIENT)
Dept: INTERNAL MEDICINE CLINIC | Age: 86
End: 2021-02-23
Payer: MEDICARE

## 2021-02-23 VITALS
TEMPERATURE: 97.9 F | HEART RATE: 88 BPM | HEIGHT: 70 IN | RESPIRATION RATE: 16 BRPM | WEIGHT: 182.8 LBS | BODY MASS INDEX: 26.17 KG/M2 | OXYGEN SATURATION: 97 % | DIASTOLIC BLOOD PRESSURE: 74 MMHG | SYSTOLIC BLOOD PRESSURE: 139 MMHG

## 2021-02-23 DIAGNOSIS — K21.9 GASTROESOPHAGEAL REFLUX DISEASE WITHOUT ESOPHAGITIS: ICD-10-CM

## 2021-02-23 DIAGNOSIS — C61 PROSTATE CANCER (HCC): ICD-10-CM

## 2021-02-23 DIAGNOSIS — M15.9 OSTEOARTHRITIS OF MULTIPLE JOINTS, UNSPECIFIED OSTEOARTHRITIS TYPE: ICD-10-CM

## 2021-02-23 DIAGNOSIS — J30.89 PERENNIAL ALLERGIC RHINITIS: ICD-10-CM

## 2021-02-23 DIAGNOSIS — E55.9 VITAMIN D DEFICIENCY: ICD-10-CM

## 2021-02-23 DIAGNOSIS — Z79.891 ADMISSION FOR LONG-TERM OPIATE ANALGESIC USE: ICD-10-CM

## 2021-02-23 DIAGNOSIS — N18.31 STAGE 3A CHRONIC KIDNEY DISEASE (HCC): ICD-10-CM

## 2021-02-23 DIAGNOSIS — Z00.00 MEDICARE ANNUAL WELLNESS VISIT, SUBSEQUENT: Primary | ICD-10-CM

## 2021-02-23 DIAGNOSIS — N40.0 BENIGN PROSTATIC HYPERPLASIA WITHOUT LOWER URINARY TRACT SYMPTOMS: ICD-10-CM

## 2021-02-23 PROCEDURE — G8427 DOCREV CUR MEDS BY ELIG CLIN: HCPCS | Performed by: INTERNAL MEDICINE

## 2021-02-23 PROCEDURE — G8536 NO DOC ELDER MAL SCRN: HCPCS | Performed by: INTERNAL MEDICINE

## 2021-02-23 PROCEDURE — G8419 CALC BMI OUT NRM PARAM NOF/U: HCPCS | Performed by: INTERNAL MEDICINE

## 2021-02-23 PROCEDURE — 1101F PT FALLS ASSESS-DOCD LE1/YR: CPT | Performed by: INTERNAL MEDICINE

## 2021-02-23 PROCEDURE — G0439 PPPS, SUBSEQ VISIT: HCPCS | Performed by: INTERNAL MEDICINE

## 2021-02-23 PROCEDURE — G8510 SCR DEP NEG, NO PLAN REQD: HCPCS | Performed by: INTERNAL MEDICINE

## 2021-02-23 PROCEDURE — 99213 OFFICE O/P EST LOW 20 MIN: CPT | Performed by: INTERNAL MEDICINE

## 2021-02-23 NOTE — PROGRESS NOTES
This is the Subsequent Medicare Annual Wellness Exam, performed 12 months or more after the Initial AWV or the last Subsequent AWV    I have reviewed the patient's medical history in detail and updated the computerized patient record. Depression Risk Factor Screening:     3 most recent PHQ Screens 2/23/2021   Little interest or pleasure in doing things Not at all   Feeling down, depressed, irritable, or hopeless Not at all   Total Score PHQ 2 0       Alcohol Risk Screen    Do you average more than 1 drink per night or more than 7 drinks a week: No    In the past three months have you have had more than 4 drinks containing alcohol on one occasion: No        Functional Ability and Level of Safety:    Hearing: The patient needs further evaluation. declines      Activities of Daily Living: The home contains: handrails and grab bars  Patient does total self care      Ambulation: with difficulty, uses a cane     Fall Risk:  Fall Risk Assessment, last 12 mths 2/23/2021   Able to walk? Yes   Fall in past 12 months? 0      Abuse Screen:  Patient is not abused     Lives alone  Cognitive Screening    Has your family/caregiver stated any concerns about your memory: no     Cognitive Screening: Normal - Mini Cog Test      No memory concerns   Pt knows the month, day and year   Can recall 3/3 objects   Assessment/Plan   Education and counseling provided:  Are appropriate based on today's review and evaluation  End-of-Life planning (with patient's consent)  Influenza Vaccine  Cardiovascular screening blood test  Screening for glaucoma  Diabetes screening test    Diagnoses and all orders for this visit:    1.  Medicare annual wellness visit, subsequent        Health Maintenance Due     Health Maintenance Due   Topic Date Due    COVID-19 Vaccine (1 of 2) 06/17/1951    DTaP/Tdap/Td series (1 - Tdap) 06/17/1956    Shingrix Vaccine Age 50> (1 of 2) 06/17/1985    GLAUCOMA SCREENING Q2Y  02/18/2021       Patient Care Team Patient Care Team:  Jaja Almanza MD as PCP - General (Internal Medicine)  Jaja Almanza MD as PCP - [facility]  José Miguel Medley (Dental General Practice)  Chantelle Rock MD (Urology)  Octavio Bowden MD (Orthopedic Surgery)  Bib Batres (Dermatology)  Sherlyn Bolton MD (Gastroenterology)  Gabrielle Perez MD (Optometry)    History     Patient Active Problem List   Diagnosis Code    DJD (degenerative joint disease), multiple sites M15.9    GERD (gastroesophageal reflux disease) K21.9    History of peptic ulcer disease Z87.11    BPH (benign prostatic hypertrophy) N40.0    Perennial allergic rhinitis J30.89    Vitamin D deficiency E55.9    Chronic kidney disease, stage III (moderate) N18.30    Prostate cancer ([facility] [address] C61    Primary osteoarthritis of both hands M19.041, [id number]     Past Medical History:   Diagnosis Date    Arthritis     severe diffuse DJD    Calculus of kidney 1988    2 bouts    Cancer [facility])     prostate    Chronic kidney disease 2010 urinary obstruction diagnosed    due to obstructive uropathy and ? NSAID use long term    GERD (gastroesophageal reflux disease) 6/2009    with mild Chin's    Hematuria, microscopic 1970's ?    negative cysto and imaging    Other ill-defined conditions(799.89) 2006    blood transfusion hx    Perennial allergic rhinitis     mold --per Dr. Obed Cabral    PUD (peptic ulcer disease) 2006    stomach    Syncope 8/21/2011      Past Surgical History:   Procedure Laterality Date    ENDOSCOPY, COLON, DIAGNOSTIC  2006    by [facility] MD; normal    HX CATARACT REMOVAL  12/28/2014    both eyes    HX COLONOSCOPY  10/22/2013    HX ENDOSCOPY      Followed by Dr. Rand Spine (multiple)    HX HEENT  1970's    septoplasty    HX ORTHOPAEDIC  2000    right rotator cuff repair    HX ORTHOPAEDIC      right knee total replacement    HX PROSTATECTOMY  2010    green light laser by Dr. Yoshi Woods  5/7/13    CYSTOSCOPY WITH OPTICAL INTERNAL URETHROTOMY, AND CRYOABLATION OF PROSTATE     Current Outpatient Medications   Medication Sig Dispense Refill    levocetirizine (XYZAL) 5 mg tablet TAKE ONE TABLET BY MOUTH EVERY NIGHT AT BEDTIME 90 Tab 0    ProAir RespiClick 90 mcg/actuation breath activated inhaler INHALE TWO PUFFS BY MOUTH EVERY 4 HOURS AS NEEDED FOR WHEEZING OR COUGH 1 Inhaler 0     No Known Allergies    Family History   Problem Relation Age of Onset    Heart Disease Mother     Stroke Mother     Hypertension Mother     Arthritis-osteo Mother         neck and spine; back surgeries x5    Dementia Father         Alzheimer's type    Pneumonia Father         cause of death   Meli Curl Arthritis-osteo Sister     Other Son         [employer]osp[employer] (unsure where)    No Known Problems Daughter      Social History     Tobacco Use    Smoking status: Former Smoker     Packs/day: 2.00     Years: 10.00     Pack years: 20.00     Types: Cigarettes     Quit date: 1961     Years since quittin.1    Smokeless tobacco: Never Used    Tobacco comment: When quit - over 3ppd   Substance Use Topics    Alcohol use: No     Alcohol/week: 0.0 standard drinks     Comment: quit completely in     ACP not on file. SDM is his son Unknown Francisco).       Colonoscopy: 10/22/13, Dr. Tristan Mccord, repeat 10 years    PSA:  0.3 no longer checking patient declines further evaluation  AAA screen: 10/10, negative    Tdap: declines further   Pneumovax: 2019  Prevnar 13: 2017  Shingrix: ordereddue will get at pharm    Flu shot:     Eye exam: Dr. Maris Fowler, scheduled 3/21  Lipids:   annual  A1c:  5.7, fasting glucose 2020 annual    Medication reconciliation completed by MA and reviewed by me. Medical/surgical/social/family history reviewed and updated by me. Patient provided AVS and preventative screening table. Patient verbalized understanding of all information discussed.

## 2021-02-23 NOTE — PATIENT INSTRUCTIONS
Medicare Wellness Visit, Male The best way to live healthy is to have a lifestyle where you eat a well-balanced diet, exercise regularly, limit alcohol use, and quit all forms of tobacco/nicotine, if applicable. Regular preventive services are another way to keep healthy. Preventive services (vaccines, screening tests, monitoring & exams) can help personalize your care plan, which helps you manage your own care. Screening tests can find health problems at the earliest stages, when they are easiest to treat. [facility] follows the current, evidence-based guidelines published by the [facility] ([facility]) when recommending preventive services for our patients. Because we follow these guidelines, sometimes recommendations change over time as research supports it. (For example, a prostate screening blood test is no longer routinely recommended for men with no symptoms). Of course, you and your doctor may decide to screen more often for some diseases, based on your risk and co-morbidities (chronic disease you are already diagnosed with). Preventive services for you include: - Medicare offers their members a free annual wellness visit, which is time for you and your primary care provider to discuss and plan for your preventive service needs. Take advantage of this benefit every year! 
-All adults over age 72 should receive the recommended pneumonia vaccines. Current USPSTF guidelines recommend a series of two vaccines for the best pneumonia protection.  
-All adults should have a flu vaccine yearly and tetanus vaccine every 10 years. 
-All adults age 48 and older should receive the shingles vaccines (series of two vaccines). -All adults age 38-68 who are overweight should have a diabetes screening test once every three years. -Other screening tests & preventive services for persons with diabetes include: an eye exam to screen for diabetic retinopathy, a kidney function test, a foot exam, and stricter control over your cholesterol.  
-Cardiovascular screening for adults with routine risk involves an electrocardiogram (ECG) at intervals determined by the provider.  
-Colorectal cancer screening should be done for adults age 54-65 with no increased risk factors for colorectal cancer. There are a number of acceptable methods of screening for this type of cancer. Each test has its own benefits and drawbacks. Discuss with your provider what is most appropriate for you during your annual wellness visit. The different tests include: colonoscopy (considered the best screening method), a fecal occult blood test, a fecal DNA test, and sigmoidoscopy. 
-All adults born between [facility] should be screened once for Hepatitis C. 
-An Abdominal Aortic Aneurysm (AAA) Screening is recommended for men age 73-68 who has ever smoked in their lifetime. Here is a list of your current Health Maintenance items (your personalized list of preventive services) with a due date: 
Health Maintenance Due Topic Date Due  
 COVID-19 Vaccine (1 of 2) 06/17/1951  
 DTaP/Tdap/Td  (1 - Tdap) 06/17/1956  Shingles Vaccine (1 of 2) 06/17/1985  Glaucoma Screening   02/18/2021

## 2021-03-01 LAB
ALBUMIN SERPL-MCNC: 4.6 G/DL (ref 3.6–4.6)
ALBUMIN/GLOB SERPL: 2 {RATIO} (ref 1.2–2.2)
ALP SERPL-CCNC: 91 IU/L (ref 39–117)
ALT SERPL-CCNC: 13 IU/L (ref 0–44)
AST SERPL-CCNC: 20 IU/L (ref 0–40)
BILIRUB SERPL-MCNC: 0.9 MG/DL (ref 0–1.2)
BUN SERPL-MCNC: 22 MG/DL (ref 8–27)
BUN/CREAT SERPL: 14 (ref 10–24)
CALCIUM SERPL-MCNC: 9.6 MG/DL (ref 8.6–10.2)
CHLORIDE SERPL-SCNC: 102 MMOL/L (ref 96–106)
CO2 SERPL-SCNC: 22 MMOL/L (ref 20–29)
CREAT SERPL-MCNC: 1.53 MG/DL (ref 0.76–1.27)
DRUGS UR: NORMAL
ERYTHROCYTE [DISTWIDTH] IN BLOOD BY AUTOMATED COUNT: 12.3 % (ref 11.6–15.4)
GLOBULIN SER CALC-MCNC: 2.3 G/DL (ref 1.5–4.5)
GLUCOSE SERPL-MCNC: 93 MG/DL (ref 65–99)
HCT VFR BLD AUTO: 45.2 % (ref 37.5–51)
HGB BLD-MCNC: 15.3 G/DL (ref 13–17.7)
MCH RBC QN AUTO: 32.7 PG (ref 26.6–33)
MCHC RBC AUTO-ENTMCNC: 33.8 G/DL (ref 31.5–35.7)
MCV RBC AUTO: 97 FL (ref 79–97)
PLATELET # BLD AUTO: 202 X10E3/UL (ref 150–450)
POTASSIUM SERPL-SCNC: 4.6 MMOL/L (ref 3.5–5.2)
PROT SERPL-MCNC: 6.9 G/DL (ref 6–8.5)
RBC # BLD AUTO: 4.68 X10E6/UL (ref 4.14–5.8)
SODIUM SERPL-SCNC: 139 MMOL/L (ref 134–144)
TSH SERPL DL<=0.005 MIU/L-ACNC: 0.9 UIU/ML (ref 0.45–4.5)
WBC # BLD AUTO: 7.1 X10E3/UL (ref 3.4–10.8)

## 2021-04-21 DIAGNOSIS — M15.9 OSTEOARTHRITIS OF MULTIPLE JOINTS, UNSPECIFIED OSTEOARTHRITIS TYPE: ICD-10-CM

## 2021-04-22 RX ORDER — TRAMADOL HYDROCHLORIDE 50 MG/1
TABLET ORAL
Qty: 90 TAB | Refills: 1 | Status: SHIPPED | OUTPATIENT
Start: 2021-04-22 | End: 2021-05-24 | Stop reason: SDUPTHER

## 2021-05-14 DIAGNOSIS — G89.4 CHRONIC PAIN SYNDROME: ICD-10-CM

## 2021-05-14 RX ORDER — LEVOCETIRIZINE DIHYDROCHLORIDE 5 MG/1
TABLET, FILM COATED ORAL
Qty: 90 TAB | Refills: 0 | Status: SHIPPED | OUTPATIENT
Start: 2021-05-14 | End: 2021-08-18

## 2021-05-19 NOTE — PROGRESS NOTES
HISTORY OF PRESENT ILLNESS  Adriana Nassar is a 80 y.o. male. HPI     Last here 2/23/21 Pt is here to f/u on chronic conditions.      BP today is controlled  No longer on  lisinopril 5mg daily to protect his kidneys d/t high k      Wt is 179 lbs - down 3 lbs x lov    He has been eating less     Reviewed labs   Will get labs today      Previously, I referred the pt to Dr. Delfino Genao (rheum)  However, he did not schedule an appt with this rheum  Not intertested in rheum at this time     Pt follows with Dr. Samantha Vee (uro) for prostate cancer  Last PSA 0.3- rising  Discussed I think this is important to follow up on and though he has had prostate removed the cancer can still recur - he DOES NOT WANT TO TALK ABOUT THIS ANYMORE  Pt is aware and does not want to follow up at this time   Recall last note 3/30/16:  PSA undetectable, pt doing well no further UTI. Recall pt's prostate is removed     Advised him to take vit D OTC 1000U daily   Pt started this but then stopped taking        Takes pepcid prn, uses rarely       Continues on xyzol qhs for allergies - typically works well but sometimes has to take Claritin in the AM --worsening with recent pollens this helps  Recall pt is allergic to fungus and pet dander     No longer uses lactulose prn - he has been eating collards which has helped sxs     I give him tramadol for his back and hand pain  Now he is getting 90 tablets q3 months- about 2 per day  Will take an extra tab when his hands are bothering him a lot  Pt knows not to give this to anyone else  This helps with his pain  Not sedating or overly constipationg   Pt also has percocet to use prn (rarely - takes, bottle lasted >1 years) for more severe back pain - needs refill   Pain contract signed on 8/20  Urine drug screen complete 2/25/21    Murmur heard on exam, ordered echo      Has CKD I monitor his renal function creatinine runs around 1.5-1.7 baseline      ACP not on file.  SDM is his son Mandy Wallace Ellen Davis).     Recall the patient has OA in his hands which bothers him the most, also some in the neck. He continues to take tramadol for this 6 tabs per day. Gets 540 tabs for 3 months. --i prescribe this now, we addressed this last time and he stated he still gets pain and does not think the tramadol does anything but takes it for maintenance anyway       Recall he has a dx of prostate cancer since January 2013 , sees calin annually and last saw him in January 2015--states he was released from care at that time.    Recall the patient has h/o ckd, last cr 1.5 continues to be stable. , prior was 1.77, uns 1.5-1.77  Recall that he Used to follow with rheumatologist dr Yas Avalos years ago --currently not seeing anyone. Recall the patient has hansens and a h/o stomach ulcer, sees ry for this.  Sx controlled with prilosec bid , he states he saw him last year or the year before, sx well controlled, only one or 2 breakthrough episodes.        PREVENTIVE:    Colonoscopy: 10/22/13, Dr. Larry Retana, repeat 10 years  EGD: 4/16/16, Dr Larry Retana, repeat 3 years, cancelled has not had any problems  PSA: 1/13 dx'd with prostate cancer, 3/16, 12/17 0.1, 6/18 0.1, 12/18 0.2, followed by Dr. Mendy Ledesma, 12/19 0.3  AAA screen: 10/10, negative  Tdap: declines further   Pneumovax: 12/20/2019  Prevnar 13: 6/14/2017  Shingrix: ordered 06/13/18,due will get at pharm    Flu shot: 11/20  Eye exam: Dr. Gaurav Olsen, 3/21  Lipids: 8/20   A1c: 6/16 5.9, 12/16 5.7  Pain contract: signed 8/25/20  Urine drug screen:  c/w tramadol, 2/25/21  Covid: both ([facility] Kiss)    Patient Active Problem List    Diagnosis Date Noted    Primary osteoarthritis of both hands 06/13/2018    Prostate cancer ([facility] [address] 02/18/2013    Chronic kidney disease, stage III (moderate) ([facility] [address] 06/28/2011    Vitamin D deficiency 06/27/2011    DJD (degenerative joint disease), multiple sites 03/09/2010    GERD (gastroesophageal reflux disease) 03/09/2010    History of peptic ulcer disease 03/09/2010    Benign prostatic hyperplasia 03/09/2010    Perennial allergic rhinitis 03/09/2010     Current Outpatient Medications   Medication Sig Dispense Refill    traMADoL (ULTRAM) 50 mg tablet Take 1 Tablet by mouth every eight (8) hours as needed for Pain for up to 90 days.  Max Daily Amount: 150 mg. 90 Tablet 2    levocetirizine (XYZAL) 5 mg tablet TAKE ONE TABLET BY MOUTH EVERY NIGHT AT BEDTIME 90 Tab 0    ProAir RespiClick 90 mcg/actuation breath activated inhaler INHALE TWO PUFFS BY MOUTH EVERY 4 HOURS AS NEEDED FOR WHEEZING OR COUGH 1 Inhaler 0     Past Surgical History:   Procedure Laterality Date    ENDOSCOPY, COLON, DIAGNOSTIC  2006    by Lucy IRELAND; normal    HX CATARACT REMOVAL  12/28/2014    both eyes    HX COLONOSCOPY  10/22/2013    HX ENDOSCOPY      Followed by Dr. [facility] (multiple)    HX HEENT  1970's    septoplasty    HX ORTHOPAEDIC  2000    right rotator cuff repair    HX ORTHOPAEDIC      right knee total replacement    HX PROSTATECTOMY  2010    green light laser by Dr. Mari Blackburn HX UROLOGICAL  5/7/13    CYSTOSCOPY WITH OPTICAL INTERNAL URETHROTOMY, AND CRYOABLATION OF PROSTATE      Lab Results   Component Value Date/Time    WBC 7.1 02/25/2021 10:27 AM    HGB 15.3 02/25/2021 10:27 AM    HCT 45.2 02/25/2021 10:27 AM    PLATELET 746 85/11/7525 10:27 AM    MCV 97 02/25/2021 10:27 AM     Lab Results   Component Value Date/Time    Cholesterol, total 199 08/27/2020 10:36 AM    HDL Cholesterol 46 08/27/2020 10:36 AM    LDL, calculated 131 (H) 08/27/2020 10:36 AM    Triglyceride 112 08/27/2020 10:36 AM    CHOL/HDL Ratio 3.6 06/25/2010 08:36 AM     Lab Results   Component Value Date/Time    GFR est non-AA 41 (L) 02/25/2021 10:27 AM    GFR est AA 47 (L) 02/25/2021 10:27 AM    Creatinine 1.53 (H) 02/25/2021 10:27 AM    BUN 22 02/25/2021 10:27 AM    Sodium 139 02/25/2021 10:27 AM    Potassium 4.6 02/25/2021 10:27 AM    Chloride 102 02/25/2021 10:27 AM    CO2 22 02/25/2021 10:27 AM Review of Systems   Respiratory: Negative for shortness of breath. Cardiovascular: Negative for chest pain. Physical Exam  Constitutional:       General: He is not in acute distress. Appearance: Normal appearance. He is not ill-appearing, toxic-appearing or diaphoretic. HENT:      Head: Normocephalic and atraumatic. Right Ear: External ear normal.      Left Ear: External ear normal.   Eyes:      General:         Right eye: No discharge. Left eye: No discharge. Conjunctiva/sclera: Conjunctivae normal.      Pupils: Pupils are equal, round, and reactive to light. Cardiovascular:      Rate and Rhythm: Normal rate and regular rhythm. Pulses: Normal pulses. Heart sounds: Murmur heard. No friction rub. No gallop. Pulmonary:      Effort: No respiratory distress. Breath sounds: Normal breath sounds. No wheezing or rales. Chest:      Chest wall: No tenderness. Musculoskeletal:         General: Normal range of motion. Cervical back: Normal range of motion and neck supple. Skin:     General: Skin is warm and dry. Neurological:      Mental Status: He is alert and oriented to person, place, and time. Mental status is at baseline. Coordination: Coordination normal.      Gait: Gait normal.   Psychiatric:         Mood and Affect: Mood normal.         Behavior: Behavior normal.         ASSESSMENT and PLAN    ICD-10-CM ICD-9-CM    1.  Benign prostatic hyperplasia without lower urinary tract symptoms         Controlled without meds     N40.0 600.00 LIPID PANEL      METABOLIC PANEL, COMPREHENSIVE      CBC W/O DIFF      TSH 3RD GENERATION      TOXASSURE SELECT 13 (MW)      LIPID PANEL      METABOLIC PANEL, COMPREHENSIVE      CBC W/O DIFF      TSH 3RD GENERATION      TOXASSURE SELECT 13 (MW)   2. Prostate cancer ([facility] [address]       Declines further evaluation C61 185 LIPID PANEL      METABOLIC PANEL, COMPREHENSIVE      CBC W/O DIFF      TSH 3RD GENERATION      Bony Batter SELECT 13 (MW)      LIPID PANEL      METABOLIC PANEL, COMPREHENSIVE      CBC W/O DIFF      TSH 3RD GENERATION      TOXASSURE SELECT 13 (MW)   3. Stage 3a chronic kidney disease (HCC)  N18.31 585.3 LIPID PANEL      METABOLIC PANEL, COMPREHENSIVE   Creatinine runs around 1.5-1.7 baseline continue to closely monitor her creatinine   CBC W/O DIFF      TSH 3RD GENERATION      TOXASSURE SELECT 13 (MW)      LIPID PANEL      METABOLIC PANEL, COMPREHENSIVE      CBC W/O DIFF      TSH 3RD GENERATION      TOXASSURE SELECT 13 (MW)   4. Gastroesophageal reflux disease without esophagitis  K21.9 530.81 LIPID PANEL      METABOLIC PANEL, COMPREHENSIVE      CBC W/O DIFF      TSH 3RD GENERATION      TOXASSURE SELECT 13 (MW)      LIPID PANEL      METABOLIC PANEL, COMPREHENSIVE      CBC W/O DIFF      TSH 3RD GENERATION      TOXASSURE SELECT 13 (MW)   5. Perennial allergic rhinitis  J30.89 477.8 LIPID PANEL      METABOLIC PANEL, COMPREHENSIVE      CBC W/O DIFF      TSH 3RD GENERATION   Improved on Xyzal continue   TOXASSURE SELECT 13 (MW)      LIPID PANEL      METABOLIC PANEL, COMPREHENSIVE      CBC W/O DIFF      TSH 3RD GENERATION      TOXASSURE SELECT 13 (MW)   6. Drug-induced constipation  K59.03 564.09 LIPID PANEL     R146.1 METABOLIC PANEL, COMPREHENSIVE   Has lactulose doing better with more greens in his diet   CBC W/O DIFF      TSH 3RD GENERATION      TOXASSURE SELECT 13 (MW)      LIPID PANEL      METABOLIC PANEL, COMPREHENSIVE      CBC W/O DIFF      TSH 3RD GENERATION      TOXASSURE SELECT 13 (MW)   7.  Osteoarthritis of multiple joints, unspecified osteoarthritis type       On tramadol that he gets from me has decreased this from his past use    Generally takes 2/day up to 3/day gets 90 tablets/month this works well    Urine drug screen is been ordered    We will redo pain contract in August for annual update    We will get  for review    His constipation is controlled he is not overly sedated he does not give this medication away doing well no signs of abuse M15.9 715.89 traMADoL (ULTRAM) 50 mg tablet   8. Cardiac murmur     Check echocardiogram R01.1 785. 2       Depression screen reviewed and negative      Scribed by Silvia Yuen of [address] [place] [address] [address], as dictated by Dr. Albert River. Current diagnosis and concerns discussed with pt at length. Pt understands risks and benefits or current treatment plan and medications, and accepts the treatment and medication with any possible risks. Pt asks appropriate questions, which were answered. Pt was instructed to call with any concerns or problems. I have reviewed the note documented by the scribe. The services provided are my own.   The documentation is accurate

## 2021-05-24 ENCOUNTER — OFFICE VISIT (OUTPATIENT)
Dept: INTERNAL MEDICINE CLINIC | Age: 86
End: 2021-05-24
Payer: MEDICARE

## 2021-05-24 VITALS
HEART RATE: 96 BPM | OXYGEN SATURATION: 96 % | WEIGHT: 179 LBS | RESPIRATION RATE: 16 BRPM | SYSTOLIC BLOOD PRESSURE: 139 MMHG | DIASTOLIC BLOOD PRESSURE: 89 MMHG | HEIGHT: 70 IN | TEMPERATURE: 97.8 F | BODY MASS INDEX: 25.62 KG/M2

## 2021-05-24 DIAGNOSIS — N18.31 STAGE 3A CHRONIC KIDNEY DISEASE (HCC): ICD-10-CM

## 2021-05-24 DIAGNOSIS — M15.9 OSTEOARTHRITIS OF MULTIPLE JOINTS, UNSPECIFIED OSTEOARTHRITIS TYPE: ICD-10-CM

## 2021-05-24 DIAGNOSIS — K21.9 GASTROESOPHAGEAL REFLUX DISEASE WITHOUT ESOPHAGITIS: ICD-10-CM

## 2021-05-24 DIAGNOSIS — K59.03 DRUG-INDUCED CONSTIPATION: ICD-10-CM

## 2021-05-24 DIAGNOSIS — N40.0 BENIGN PROSTATIC HYPERPLASIA WITHOUT LOWER URINARY TRACT SYMPTOMS: Primary | ICD-10-CM

## 2021-05-24 DIAGNOSIS — R01.1 CARDIAC MURMUR: ICD-10-CM

## 2021-05-24 DIAGNOSIS — J30.89 PERENNIAL ALLERGIC RHINITIS: ICD-10-CM

## 2021-05-24 DIAGNOSIS — C61 PROSTATE CANCER (HCC): ICD-10-CM

## 2021-05-24 PROCEDURE — 1101F PT FALLS ASSESS-DOCD LE1/YR: CPT | Performed by: INTERNAL MEDICINE

## 2021-05-24 PROCEDURE — G8536 NO DOC ELDER MAL SCRN: HCPCS | Performed by: INTERNAL MEDICINE

## 2021-05-24 PROCEDURE — 99214 OFFICE O/P EST MOD 30 MIN: CPT | Performed by: INTERNAL MEDICINE

## 2021-05-24 PROCEDURE — G8510 SCR DEP NEG, NO PLAN REQD: HCPCS | Performed by: INTERNAL MEDICINE

## 2021-05-24 PROCEDURE — G8419 CALC BMI OUT NRM PARAM NOF/U: HCPCS | Performed by: INTERNAL MEDICINE

## 2021-05-24 PROCEDURE — G8427 DOCREV CUR MEDS BY ELIG CLIN: HCPCS | Performed by: INTERNAL MEDICINE

## 2021-05-24 RX ORDER — TRAMADOL HYDROCHLORIDE 50 MG/1
50 TABLET ORAL
Qty: 90 TABLET | Refills: 2 | Status: SHIPPED | OUTPATIENT
Start: 2021-05-24 | End: 2021-08-22

## 2021-06-21 ENCOUNTER — HOSPITAL ENCOUNTER (OUTPATIENT)
Dept: NON INVASIVE DIAGNOSTICS | Age: 86
Discharge: HOME | End: 2021-06-21
Attending: INTERNAL MEDICINE
Payer: MEDICARE

## 2021-06-21 VITALS
SYSTOLIC BLOOD PRESSURE: 139 MMHG | DIASTOLIC BLOOD PRESSURE: 89 MMHG | HEIGHT: 70 IN | WEIGHT: 179 LBS | BODY MASS INDEX: 25.62 KG/M2

## 2021-06-21 DIAGNOSIS — R01.1 CARDIAC MURMUR: ICD-10-CM

## 2021-06-21 LAB
ECHO AO ROOT DIAM: 4.25 CM
ECHO AV AREA PEAK VELOCITY: 2.11 CM2
ECHO AV AREA VTI: 1.95 CM2
ECHO AV AREA/BSA PEAK VELOCITY: 1.1 CM2/M2
ECHO AV AREA/BSA VTI: 1 CM2/M2
ECHO AV MEAN GRADIENT: 1.91 MMHG
ECHO AV PEAK GRADIENT: 4.35 MMHG
ECHO AV PEAK VELOCITY: 104.34 CM/S
ECHO AV VTI: 19.41 CM
ECHO LA MAJOR AXIS: 3.01 CM
ECHO LA MINOR AXIS: 1.51 CM
ECHO LV E' LATERAL VELOCITY: 6.96 CM/S
ECHO LV INTERNAL DIMENSION DIASTOLIC: 3.14 CM (ref 4.2–5.9)
ECHO LV INTERNAL DIMENSION SYSTOLIC: 2.58 CM
ECHO LV IVSD: 1.33 CM (ref 0.6–1)
ECHO LV MASS 2D: 137.2 G (ref 88–224)
ECHO LV MASS INDEX 2D: 68.9 G/M2 (ref 49–115)
ECHO LV POSTERIOR WALL DIASTOLIC: 1.33 CM (ref 0.6–1)
ECHO LVOT CARDIAC OUTPUT: 2.86 LITER/MINUTE
ECHO LVOT DIAM: 1.88 CM
ECHO LVOT PEAK GRADIENT: 2.49 MMHG
ECHO LVOT PEAK VELOCITY: 78.96 CM/S
ECHO LVOT SV: 37.9 ML
ECHO LVOT VTI: 13.62 CM
ECHO MV A VELOCITY: 68.25 CM/S
ECHO MV AREA PHT: 4.73 CM2
ECHO MV AREA VTI: 2.53 CM2
ECHO MV E DECELERATION TIME (DT): 239.28 MS
ECHO MV E VELOCITY: 39.82 CM/S
ECHO MV E/A RATIO: 0.58
ECHO MV E/E' LATERAL: 5.72
ECHO MV MAX VELOCITY: 89.06 CM/S
ECHO MV MEAN GRADIENT: 1.24 MMHG
ECHO MV PEAK GRADIENT: 3.17 MMHG
ECHO MV PRESSURE HALF TIME (PHT): 46.46 MS
ECHO MV VTI: 15.01 CM
ECHO PV MAX VELOCITY: 88.12 CM/S
ECHO PV PEAK INSTANTANEOUS GRADIENT SYSTOLIC: 3.11 MMHG
LVOT MG: 1.16 MMHG

## 2021-06-21 PROCEDURE — 93306 TTE W/DOPPLER COMPLETE: CPT | Performed by: INTERNAL MEDICINE

## 2021-06-21 PROCEDURE — 93306 TTE W/DOPPLER COMPLETE: CPT

## 2021-06-21 NOTE — PROGRESS NOTES
Please call patient back about results  Pt needs to see cardiology for eval  Echo shows new reduced ef--heart failure   Needs cards eval/tx

## 2021-06-24 ENCOUNTER — TELEPHONE (OUTPATIENT)
Dept: INTERNAL MEDICINE CLINIC | Age: 86
End: 2021-06-24

## 2021-06-24 DIAGNOSIS — R93.1 ABNORMAL ECHOCARDIOGRAM: Primary | ICD-10-CM

## 2021-06-24 NOTE — TELEPHONE ENCOUNTER
----- Message from Jose Luis Peters MD sent at 6/24/2021  8:20 AM EDT -----  See below pt was never called

## 2021-06-24 NOTE — TELEPHONE ENCOUNTER
Patient has an appointment on 7/21/21 with Ailyn Birmingham. Identified patient 2 identifiers verified. Spoke with patient earlier today and was given contact number to [facility].

## 2021-06-24 NOTE — TELEPHONE ENCOUNTER
----- Message from Eliezer Ram MD sent at 6/24/2021  8:20 AM EDT -----  See below pt was never called

## 2021-06-24 NOTE — TELEPHONE ENCOUNTER
----- Message from Kamari Chambers MD sent at 6/24/2021  8:20 AM EDT -----  See below pt was never called

## 2021-06-24 NOTE — TELEPHONE ENCOUNTER
Detailed message was left for patient about Echo results and plan of treatment to see Cardiology for evaluation and treatment.

## 2021-06-24 NOTE — PROGRESS NOTES
Spoke with patient. Identified patient 2 identifiers verified. Patient aware of echo results and was given contact number to [facility].

## 2021-07-21 ENCOUNTER — OFFICE VISIT (OUTPATIENT)
Dept: CARDIOLOGY CLINIC | Age: 86
End: 2021-07-21
Payer: MEDICARE

## 2021-07-21 VITALS
HEIGHT: 70 IN | WEIGHT: 178.1 LBS | OXYGEN SATURATION: 97 % | RESPIRATION RATE: 18 BRPM | DIASTOLIC BLOOD PRESSURE: 82 MMHG | BODY MASS INDEX: 25.5 KG/M2 | HEART RATE: 105 BPM | SYSTOLIC BLOOD PRESSURE: 122 MMHG

## 2021-07-21 DIAGNOSIS — I77.810 DILATED AORTIC ROOT (HCC): ICD-10-CM

## 2021-07-21 DIAGNOSIS — I50.21 ACUTE SYSTOLIC CONGESTIVE HEART FAILURE (HCC): Primary | ICD-10-CM

## 2021-07-21 DIAGNOSIS — R06.09 DOE (DYSPNEA ON EXERTION): ICD-10-CM

## 2021-07-21 DIAGNOSIS — R01.1 CARDIAC MURMUR: ICD-10-CM

## 2021-07-21 DIAGNOSIS — N18.30 STAGE 3 CHRONIC KIDNEY DISEASE, UNSPECIFIED WHETHER STAGE 3A OR 3B CKD (HCC): ICD-10-CM

## 2021-07-21 DIAGNOSIS — E78.2 MIXED HYPERLIPIDEMIA: ICD-10-CM

## 2021-07-21 PROCEDURE — G8419 CALC BMI OUT NRM PARAM NOF/U: HCPCS | Performed by: INTERNAL MEDICINE

## 2021-07-21 PROCEDURE — G8432 DEP SCR NOT DOC, RNG: HCPCS | Performed by: INTERNAL MEDICINE

## 2021-07-21 PROCEDURE — G8536 NO DOC ELDER MAL SCRN: HCPCS | Performed by: INTERNAL MEDICINE

## 2021-07-21 PROCEDURE — 93000 ELECTROCARDIOGRAM COMPLETE: CPT | Performed by: INTERNAL MEDICINE

## 2021-07-21 PROCEDURE — 1101F PT FALLS ASSESS-DOCD LE1/YR: CPT | Performed by: INTERNAL MEDICINE

## 2021-07-21 PROCEDURE — 99203 OFFICE O/P NEW LOW 30 MIN: CPT | Performed by: INTERNAL MEDICINE

## 2021-07-21 PROCEDURE — G8427 DOCREV CUR MEDS BY ELIG CLIN: HCPCS | Performed by: INTERNAL MEDICINE

## 2021-07-21 RX ORDER — CARVEDILOL 3.12 MG/1
3.12 TABLET ORAL 2 TIMES DAILY WITH MEALS
Qty: 60 TABLET | Refills: 2 | Status: SHIPPED | OUTPATIENT
Start: 2021-07-21 | End: 2021-08-06

## 2021-07-21 NOTE — PROGRESS NOTES
[address], [address] [facility]  [phone]     Subjective:      Merita Sicard is a 80 y.o. male is here for new pt consultation. Pmhx HLD, Prostate ca, ckd III and GERD. Remote hx smoking. Family hx CAD (mother). Seen by pcp, initially referred to us re cardiac murmur. Echo was done which showed new onset CM, echo posted below. Today, reports occasional brooks. The patient denies chest pain, orthopnea, PND, LE edema, palpitations, syncope, or presyncope. ECHO 6/2021  · LV: Estimated LVEF is 35 - 40%. Normal cavity size and wall thickness. · Dilated aortic root; diameter is 4.3 cm. Patient Active Problem List    Diagnosis Date Noted    Primary osteoarthritis of both hands 06/13/2018    Prostate cancer ([facility] 02/18/2013    Chronic kidney disease, stage III (moderate) ([facility] 06/28/2011    Vitamin D deficiency 06/27/2011    DJD (degenerative joint disease), multiple sites 03/09/2010    GERD (gastroesophageal reflux disease) 03/09/2010    History of peptic ulcer disease 03/09/2010    Benign prostatic hyperplasia 03/09/2010    Perennial allergic rhinitis 03/09/2010      Jena Pennington MD  Past Medical History:   Diagnosis Date    Arthritis     severe diffuse DJD    Calculus of kidney 1988    2 bouts    Cancer [facility])     prostate    Chronic kidney disease 2010 urinary obstruction diagnosed    due to obstructive uropathy and ? NSAID use long term    GERD (gastroesophageal reflux disease) 6/2009    with mild Chin's    Hematuria, microscopic 1970's ?    negative cysto and imaging    Murmur     Other ill-defined conditions(799.89) 2006    blood transfusion hx    Perennial allergic rhinitis     mold --per Dr. Jackelyn Will    PUD (peptic ulcer disease) 2006    stomach    Syncope 8/21/2011      Past Surgical History:   Procedure Laterality Date    ENDOSCOPY, COLON, DIAGNOSTIC  2006    by [facility] MD; normal    HX CATARACT REMOVAL  12/28/2014 both eyes    HX COLONOSCOPY  10/22/2013    HX ENDOSCOPY      Followed by Dr. Dominic Oneill (multiple)    HX HEENT  7752'T    septoplasty    HX ORTHOPAEDIC  2000    right rotator cuff repair    HX ORTHOPAEDIC      right knee total replacement    HX PROSTATECTOMY      green light laser by Dr. Paul San  13    CYSTOSCOPY WITH OPTICAL INTERNAL URETHROTOMY, AND CRYOABLATION OF PROSTATE     No Known Allergies   Family History   Problem Relation Age of Onset    Heart Disease Mother     Stroke Mother     Hypertension Mother     Arthritis-osteo Mother         neck and spine; back surgeries x5    Dementia Father         Alzheimer's type    Pneumonia Father         cause of death   [facility] Arthritis-osteo Sister     Other Son         [employer] (unsure where)    No Known Problems Daughter       Social History     Socioeconomic History    Marital status:      Spouse name: Not on file    Number of children: Not on file    Years of education: Not on file    Highest education level: Not on file   Occupational History    Not on file   Tobacco Use    Smoking status: Former Smoker     Packs/day: 2.00     Years: 10.00     Pack years: 20.00     Types: Cigarettes     Quit date: 1961     Years since quittin.5    Smokeless tobacco: Never Used    Tobacco comment: When quit - over 3ppd   Vaping Use    Vaping Use: Never used   Substance and Sexual Activity    Alcohol use: No     Alcohol/week: 0.0 standard drinks     Comment: quit completely in      Drug use: No     Types: OTC, Prescription    Sexual activity: Never   Other Topics Concern    Not on file   Social History Narrative    Not on file     Social Determinants of Health     Financial Resource Strain:     Difficulty of Paying Living Expenses:    Food Insecurity:     Worried About Running Out of Food in the Last Year:     [address] in the Last Year:    Transportation Needs:     Lack of Transportation (Medical):      Lack of Transportation (Non-Medical):    Physical Activity:     Days of Exercise per Week:     Minutes of Exercise per Session:    Stress:     Feeling of Stress :    Social Connections:     Frequency of Communication with Friends and Family:     Frequency of Social Gatherings with Friends and Family:     Attends [employer] Services:     Active Member of Clubs or Organizations:     Attends Club or Organization Meetings:     Marital Status:    Intimate Partner Violence:     Fear of Current or Ex-Partner:     Emotionally Abused:     Physically Abused:     Sexually Abused:       Current Outpatient Medications   Medication Sig    carvediloL (COREG) 3.125 mg tablet Take 1 Tablet by mouth two (2) times daily (with meals).  traMADoL (ULTRAM) 50 mg tablet Take 1 Tablet by mouth every eight (8) hours as needed for Pain for up to 90 days. Max Daily Amount: 150 mg.    levocetirizine (XYZAL) 5 mg tablet TAKE ONE TABLET BY MOUTH EVERY NIGHT AT BEDTIME    ProAir RespiClick 90 mcg/actuation breath activated inhaler INHALE TWO PUFFS BY MOUTH EVERY 4 HOURS AS NEEDED FOR WHEEZING OR COUGH     No current facility-administered medications for this visit. Review of Symptoms:  11 systems reviewed, negative other than as stated in the HPI    Physical ExamPhysical Exam:    Vitals:    07/21/21 0922   BP: 122/82   Pulse: (!) 105   Resp: 18   SpO2: 97%   Weight: 178 lb 1.6 oz (80.8 kg)   Height: 5' 10\" (1.778 m)     Body mass index is 25.55 kg/m². General PE  Gen:  NAD  Mental Status - Alert. General Appearance - Not in acute distress. HEENT:  PERRL, no carotid bruits or JVD  Chest and Lung Exam   Inspection: Accessory muscles - No use of accessory muscles in breathing. Auscultation:   Breath sounds: - Normal.   Cardiovascular   Inspection: Jugular vein - Bilateral - Inspection Normal.   Palpation/Percussion:   Apical Impulse: - Normal.   Auscultation: Rhythm - Regular. Heart Sounds - S1 WNL and S2 WNL. No S3 or S4. Murmurs & Other Heart Sounds: Auscultation of the heart reveals - No Murmurs. Peripheral Vascular   Upper Extremity: Inspection - Bilateral - No Cyanotic nailbeds or Digital clubbing. Lower Extremity:   Palpation: Edema - Bilateral - No edema. Abdomen:   Soft, non-tender, bowel sounds are active. Neuro: A&O times 3, CN and motor grossly WNL    Labs:   Lab Results   Component Value Date/Time    Cholesterol, total 199 08/27/2020 10:36 AM    Cholesterol, total 160 12/13/2019 10:05 AM    Cholesterol, total 164 12/21/2018 10:43 AM    Cholesterol, total 176 12/14/2017 09:58 AM    Cholesterol, total 168 06/15/2017 10:06 AM    HDL Cholesterol 46 08/27/2020 10:36 AM    HDL Cholesterol 49 12/13/2019 10:05 AM    HDL Cholesterol 44 12/21/2018 10:43 AM    HDL Cholesterol 44 12/14/2017 09:58 AM    HDL Cholesterol 43 06/15/2017 10:06 AM    LDL, calculated 131 (H) 08/27/2020 10:36 AM    LDL, calculated 93 12/13/2019 10:05 AM    LDL, calculated 99 12/21/2018 10:43 AM    LDL, calculated 103 (H) 12/14/2017 09:58 AM    LDL, calculated 103 (H) 06/15/2017 10:06 AM    Triglyceride 112 08/27/2020 10:36 AM    Triglyceride 91 12/13/2019 10:05 AM    Triglyceride 105 12/21/2018 10:43 AM    Triglyceride 145 12/14/2017 09:58 AM    Triglyceride 111 06/15/2017 10:06 AM    CHOL/HDL Ratio 3.6 06/25/2010 08:36 AM     Lab Results   Component Value Date/Time     04/08/2019 08:44 AM     Lab Results   Component Value Date/Time    Sodium 139 02/25/2021 10:27 AM    Potassium 4.6 02/25/2021 10:27 AM    Chloride 102 02/25/2021 10:27 AM    CO2 22 02/25/2021 10:27 AM    Anion gap 8 04/08/2019 08:44 AM    Glucose 93 02/25/2021 10:27 AM    BUN 22 02/25/2021 10:27 AM    Creatinine 1.53 (H) 02/25/2021 10:27 AM    BUN/Creatinine ratio 14 02/25/2021 10:27 AM    GFR est AA 47 (L) 02/25/2021 10:27 AM    GFR est non-AA 41 (L) 02/25/2021 10:27 AM    Calcium 9.6 02/25/2021 10:27 AM    Bilirubin, total 0.9 02/25/2021 10:27 AM    Alk.  phosphatase 91 02/25/2021 10:27 AM    Protein, total 6.9 02/25/2021 10:27 AM    Albumin 4.6 02/25/2021 10:27 AM    Globulin 3.8 04/08/2019 08:44 AM    A-G Ratio 2.0 02/25/2021 10:27 AM    ALT (SGPT) 13 02/25/2021 10:27 AM       EKG:  NSR          Assessment:     Assessment:        ICD-10-CM ICD-9-CM    1. Acute systolic congestive heart failure (HCC)  I50.21 428.21 NUCLEAR CARDIAC STRESS TEST     428.0    2. Cardiac murmur  R01.1 785.2 AMB POC EKG ROUTINE W/ 12 LEADS, INTER & REP      NUCLEAR CARDIAC STRESS TEST   3. Stage 3 chronic kidney disease, unspecified whether stage 3a or 3b CKD (HCC)  N18.30 585.3 NUCLEAR CARDIAC STRESS TEST   4. ABBOTT (dyspnea on exertion)  R06.00 786.09 NUCLEAR CARDIAC STRESS TEST   5. Mixed hyperlipidemia  E78.2 272.2 NUCLEAR CARDIAC STRESS TEST   6. Dilated aortic root (HCC)  I77.810 447.71        Orders Placed This Encounter    AMB POC EKG ROUTINE W/ 12 LEADS, INTER & REP     Order Specific Question:   Reason for Exam:     Answer:   routine    carvediloL (COREG) 3.125 mg tablet     Sig: Take 1 Tablet by mouth two (2) times daily (with meals). Dispense:  60 Tablet     Refill:  2        Plan:     HFrEF  EF 35-40% no significant valvular pathology ; Dilated aortic root; diameter is 4.3 cm per echo 6/21/2021  Add carvedilol 3.125 mg BID  Obtain stress test cardiolyte   Recall: hyperkalemia with ace-I    Bp controlled    CKD III  Cr 1.53 in 2/2021    HLD  8/2020  Not on statin Labs and lipids per PCP      Hx prostate ca  Followed by Dr Jerrye Ahumada current care and f/u in     Jose Lacy NP       [facility]             Patient seen, examined by me personally. Plan discussed as detailed. Agree with note as outlined by  NP with modifications as noted. My independent physical exam reveals : Physical Exam  Vitals and nursing note reviewed. Cardiovascular:      Rate and Rhythm: Normal rate and regular rhythm. Pulses: Normal pulses. Heart sounds: Normal heart sounds. Pulmonary:      Effort: Pulmonary effort is normal.      Breath sounds: Normal breath sounds. Musculoskeletal:      Right lower leg: No edema. Left lower leg: No edema. Skin:     General: Skin is warm and dry. Neurological:      Mental Status: He is oriented to person, place, and time. Psychiatric:         Mood and Affect: Mood normal.         Behavior: Behavior normal.          No additional findings noted. Agree with plan as outlined above with modifications as noted. New onset cardiomyopathy. Will evaluate for ischemia. No ACEI due to h/o hyperkalemia. Will start low dose coreg.      Viji Gerard MD

## 2021-07-21 NOTE — LETTER
7/22/2021    Patient: Colby Lisa   YOB: 1935   Date of Visit: 7/21/2021     Keyonna Drake MD  [address]  [address] [facility]  [address]  [facility] [address]  [address] [facility] [address]    Dear Keyonna Drake MD,      Thank you for referring Mr. Sweta Whitley to [address] [place] [address] for evaluation. My notes for this consultation are attached. If you have questions, please do not hesitate to call me. I look forward to following your patient along with you.       Sincerely,    Kyle Alcala MD

## 2021-07-21 NOTE — PROGRESS NOTES
1. Have you been to the ER, urgent care clinic since your last visit? Hospitalized since your last visit? No.    2. Have you seen or consulted any other health care providers outside of the [address] [place] [address] since your last visit? Include any pap smears or colon screening.    No.      Chief Complaint   Patient presents with    New Patient     referred by PCP- soboe    Results     discuss echo in cc OPERATIVE REPORT  PATIENT NAME: Jo Ann Mccray    :  1952  MRN: [id number]  Pt Location: [facility] OR ROOM 03    SURGERY DATE: 2018    Surgeon(s) and Role:     Anna Lui DPM - Primary    Preop Diagnosis:  Cellulitis, toe [M34 444]    Post-Op Diagnosis Codes:     * Cellulitis, toe [V50 318]    Procedure(s) (LRB):  AMPUTATION GREAT TOE (Left)    Specimen(s):  ID Type Source Tests Collected by Time Destination   1 : Left Great Toe Tissue Toe, Left TISSUE EXAM Scottie Herman DPM 2018 1256        Estimated Blood Loss:   Minimal    Drains:       Anesthesia Type:   IV Sedation with Anesthesia    Operative Indications:  Cellulitis, toe [L03 039]  Osteomyelitis left great toe     Operative Findings:  Purulence, soft grey bone     Complications:   None    Procedure and Technique:  Under mild sedation the patient was brought back to the OR and laid supine on the table  Once MAC sedation was achieved, a local injection of a 1:1 mixture of 1% lidocaine plain and 0 5% marcaine plain was injected into the left foot in a local KELLEY block  An 18 inch ankle tourniquet was placed about the left ankle  The foot was then scrubbed, prepped, and draped in the usual manner  Attention was directed to the left foot, the great toe had a nonhealing wound with active draining purulence  The skin is dry and mottled, the toenail had been previously removed  A skin marker was used to delineate an incision line around the great toe  Using an esmarch bandage, the foot was exsanguinated and the tourniquet was inflated to 250 mmHg  A 15 blade was used to cut down to bone  The toe was debrided away at the MPJ  The toe was debrided away en toto  It was passed off and sent for pathology  The area was then flushed with copious amounts of normal sterile saline  The head of the metatarsal was bright white and hard      The deep tissues were then coapted using 3 0 vicryl simple interrupted sutures, and then the skin was coapted using 3 0 nylon simple interrupted sutures  The tourniquet was then deflated and a hyperemic response was noted to the left foot and remaining digits  He is dressed with xeroform and DSD with and ACE wrap  He is transferred to PACU with all VS stable and NVS intact to pre-exam status  He will recover there a short time and then return to his room         Patient Disposition:  PACU     SIGNATURE: Hector Barrios DPM  DATE: November 20, 2018  TIME: 1:14 PM

## 2021-07-23 ENCOUNTER — ANCILLARY PROCEDURE (OUTPATIENT)
Dept: CARDIOLOGY CLINIC | Age: 86
End: 2021-07-23
Payer: MEDICARE

## 2021-07-23 VITALS
DIASTOLIC BLOOD PRESSURE: 78 MMHG | HEIGHT: 70 IN | BODY MASS INDEX: 25.48 KG/M2 | SYSTOLIC BLOOD PRESSURE: 138 MMHG | WEIGHT: 178 LBS

## 2021-07-23 DIAGNOSIS — I50.21 ACUTE SYSTOLIC CONGESTIVE HEART FAILURE (HCC): ICD-10-CM

## 2021-07-23 DIAGNOSIS — R06.09 DOE (DYSPNEA ON EXERTION): ICD-10-CM

## 2021-07-23 LAB
STRESS BASELINE DIAS BP: 78 MMHG
STRESS BASELINE HR: 55 BPM
STRESS BASELINE SYS BP: 138 MMHG
STRESS PEAK DIAS BP: 78 MMHG
STRESS PEAK SYS BP: 138 MMHG
STRESS PERCENT HR ACHIEVED: 57 %
STRESS POST PEAK HR: 77 BPM
STRESS RATE PRESSURE PRODUCT: NORMAL BPM*MMHG
STRESS ST DEPRESSION: 0 MM
STRESS ST ELEVATION: 0 MM
STRESS TARGET HR: 134 BPM

## 2021-07-23 PROCEDURE — 93015 CV STRESS TEST SUPVJ I&R: CPT | Performed by: INTERNAL MEDICINE

## 2021-07-23 PROCEDURE — 78452 HT MUSCLE IMAGE SPECT MULT: CPT | Performed by: INTERNAL MEDICINE

## 2021-07-23 PROCEDURE — A9502 TC99M TETROFOSMIN: HCPCS | Performed by: INTERNAL MEDICINE

## 2021-07-27 ENCOUNTER — TELEPHONE (OUTPATIENT)
Dept: CARDIOLOGY CLINIC | Age: 86
End: 2021-07-27

## 2021-07-27 NOTE — TELEPHONE ENCOUNTER
Spoke with patient. Verified with two patient identifiers. Advised pt per [employer] NP, stress test is normal.  Low concern for significant blockages in the heart arteries at this time. Patient verbalized understanding.

## 2021-07-27 NOTE — PROGRESS NOTES
Please call the patient and inform that stress test is normal.  Low concern for significant blockages in the heart arteries at this time.     Thanks,  [employer]

## 2021-07-27 NOTE — TELEPHONE ENCOUNTER
----- Message from Alondra Abreu NP sent at 7/27/2021  3:10 PM EDT -----  Please call the patient and inform that stress test is normal.  Low concern for significant blockages in the heart arteries at this time.     ThanksDaniel

## 2021-08-05 PROBLEM — I77.810 DILATED AORTIC ROOT (HCC): Status: ACTIVE | Noted: 2021-08-05

## 2021-08-05 PROBLEM — I50.21 ACUTE SYSTOLIC CONGESTIVE HEART FAILURE (HCC): Status: ACTIVE | Noted: 2021-08-05

## 2021-08-05 PROBLEM — E78.2 MIXED HYPERLIPIDEMIA: Status: ACTIVE | Noted: 2021-08-05

## 2021-08-05 NOTE — PROGRESS NOTES
Subjective/HPI:     Roldan Ambrocio is a 80 y.o. male is here for routine f/u. He has a PMHx of non-ischemic cardiomyopathy, HLD, aortic root dilation, hx of prostate cancer. At last visit, started on carvedilol and obtained lexiscan stress test due to new onset cardiomyopathy. Tolerating low dose coreg. Denies any chest pain, SOB, palpitations. Current Outpatient Medications on File Prior to Visit   Medication Sig Dispense Refill    carvediloL (COREG) 3.125 mg tablet Take 1 Tablet by mouth two (2) times daily (with meals). 60 Tablet 2    traMADoL (ULTRAM) 50 mg tablet Take 1 Tablet by mouth every eight (8) hours as needed for Pain for up to 90 days. Max Daily Amount: 150 mg. 90 Tablet 2    levocetirizine (XYZAL) 5 mg tablet TAKE ONE TABLET BY MOUTH EVERY NIGHT AT BEDTIME 90 Tab 0    ProAir RespiClick 90 mcg/actuation breath activated inhaler INHALE TWO PUFFS BY MOUTH EVERY 4 HOURS AS NEEDED FOR WHEEZING OR COUGH 1 Inhaler 0     No current facility-administered medications on file prior to visit. Review of Symptoms:    Review of Systems   Constitutional: Negative. Negative for chills, fever and weight loss. HENT: Negative. Negative for hearing loss and nosebleeds. Eyes: Negative for blurred vision and double vision. Respiratory: Negative. Negative for cough, hemoptysis, shortness of breath and wheezing. Cardiovascular: Negative. Negative for chest pain, palpitations, orthopnea, claudication, leg swelling and PND. Gastrointestinal: Negative. Negative for nausea and vomiting. Musculoskeletal: Negative for myalgias. Skin: Negative. Negative for rash. Neurological: Negative. Negative for dizziness, loss of consciousness and headaches. Physical Exam:      General: Well developed, in no acute distress, cooperative and alert  Heart:  reg rate and rhythm; normal S1/S2; no murmurs, no gallops or rubs.    Respiratory: Clear bilaterally x 4, no wheezing or rales  Extremities:  Normal cap refill, no cyanosis, atraumatic. No edema. Vascular: 2+ pulses symmetric in all extremities    Vitals:    08/06/21 0957   BP: 132/70   BP 1 Location: Left arm   BP Patient Position: Sitting   BP Cuff Size: Adult   Pulse: 78   Resp: 18   Height: 5' 10\" (1.778 m)   Weight: 180 lb 1.6 oz (81.7 kg)   SpO2: 97%       ECG done today shows sinus rythm     Assessment:       ICD-10-CM ICD-9-CM    1. Nonischemic cardiomyopathy (HCC)  I42.8 425.4    2. Dilated aortic root (HCC)  I77.810 447.71    3. Mixed hyperlipidemia  E78.2 272.2 AMB POC EKG ROUTINE W/ 12 LEADS, INTER & REP        Plan:     1. Nonischemic cardiomyopathy ([address]  New onset cardiomyopathy, EF 35-40% on echo done 6/2021  Lexiscan stress test done 7/2021 without evidence of ischemia  On carvedilol 3.125 mg BID; will increase brooks to 6.25 mg BID. avoid ACEI/ARB/ARNI due to hyperK+ with ACE-I  Repeat echo in 3 months    2. Dilated aortic root (HCC)  Dilated aortic root, 4.3 cm on echo  Obtain CAP CTA    3.  Mixed hyperlipidemia   in 8/2020  Continue low fat, low cholesterol diet    F/u with me in 3 months with echo    Amy Goldstein MD

## 2021-08-06 ENCOUNTER — OFFICE VISIT (OUTPATIENT)
Dept: CARDIOLOGY CLINIC | Age: 86
End: 2021-08-06
Payer: MEDICARE

## 2021-08-06 VITALS
OXYGEN SATURATION: 97 % | BODY MASS INDEX: 25.78 KG/M2 | HEIGHT: 70 IN | SYSTOLIC BLOOD PRESSURE: 132 MMHG | RESPIRATION RATE: 18 BRPM | DIASTOLIC BLOOD PRESSURE: 70 MMHG | HEART RATE: 78 BPM | WEIGHT: 180.1 LBS

## 2021-08-06 DIAGNOSIS — E78.2 MIXED HYPERLIPIDEMIA: ICD-10-CM

## 2021-08-06 DIAGNOSIS — I42.8 NONISCHEMIC CARDIOMYOPATHY (HCC): Primary | ICD-10-CM

## 2021-08-06 DIAGNOSIS — I77.810 DILATED AORTIC ROOT (HCC): ICD-10-CM

## 2021-08-06 PROCEDURE — G8510 SCR DEP NEG, NO PLAN REQD: HCPCS | Performed by: INTERNAL MEDICINE

## 2021-08-06 PROCEDURE — G8419 CALC BMI OUT NRM PARAM NOF/U: HCPCS | Performed by: INTERNAL MEDICINE

## 2021-08-06 PROCEDURE — 93000 ELECTROCARDIOGRAM COMPLETE: CPT | Performed by: INTERNAL MEDICINE

## 2021-08-06 PROCEDURE — G8536 NO DOC ELDER MAL SCRN: HCPCS | Performed by: INTERNAL MEDICINE

## 2021-08-06 PROCEDURE — 1101F PT FALLS ASSESS-DOCD LE1/YR: CPT | Performed by: INTERNAL MEDICINE

## 2021-08-06 PROCEDURE — G8427 DOCREV CUR MEDS BY ELIG CLIN: HCPCS | Performed by: INTERNAL MEDICINE

## 2021-08-06 PROCEDURE — 99213 OFFICE O/P EST LOW 20 MIN: CPT | Performed by: INTERNAL MEDICINE

## 2021-08-06 RX ORDER — CARVEDILOL 6.25 MG/1
6.25 TABLET ORAL 2 TIMES DAILY
Qty: 60 TABLET | Refills: 5 | Status: SHIPPED | OUTPATIENT
Start: 2021-08-06 | End: 2022-02-09

## 2021-08-06 NOTE — PROGRESS NOTES
1. Have you been to the ER, urgent care clinic since your last visit? Hospitalized since your last visit? No    2. Have you seen or consulted any other health care providers outside of the [address] [place] [address] since your last visit? Include any pap smears or colon screening.  No     Chief Complaint   Patient presents with    Follow-up     2 wk f/up

## 2021-08-06 NOTE — LETTER
8/6/2021    Patient: Radha Luna   YOB: 1935   Date of Visit: 8/6/2021     Opal Escobar MD  [address] [facility]  [address] [facility]  [address]  [address]  [address] [facility] [address]    Dear Opal Escobar MD,      Thank you for referring Mr. Flor Bassett to [address] [place] [address] for evaluation. My notes for this consultation are attached. If you have questions, please do not hesitate to call me. I look forward to following your patient along with you.       Sincerely,    Edmundo Goff MD

## 2021-08-18 DIAGNOSIS — G89.4 CHRONIC PAIN SYNDROME: ICD-10-CM

## 2021-08-18 RX ORDER — LEVOCETIRIZINE DIHYDROCHLORIDE 5 MG/1
TABLET, FILM COATED ORAL
Qty: 90 TABLET | Refills: 0 | Status: SHIPPED | OUTPATIENT
Start: 2021-08-18 | End: 2021-11-19

## 2021-08-19 LAB
ALBUMIN SERPL-MCNC: 4.6 G/DL (ref 3.6–4.6)
ALBUMIN/GLOB SERPL: 2 {RATIO} (ref 1.2–2.2)
ALP SERPL-CCNC: 81 IU/L (ref 48–121)
ALT SERPL-CCNC: 12 IU/L (ref 0–44)
AST SERPL-CCNC: 19 IU/L (ref 0–40)
BILIRUB SERPL-MCNC: 0.5 MG/DL (ref 0–1.2)
BUN SERPL-MCNC: 29 MG/DL (ref 8–27)
BUN/CREAT SERPL: 17 (ref 10–24)
CALCIUM SERPL-MCNC: 9.2 MG/DL (ref 8.6–10.2)
CHLORIDE SERPL-SCNC: 106 MMOL/L (ref 96–106)
CHOLEST SERPL-MCNC: 194 MG/DL (ref 100–199)
CO2 SERPL-SCNC: 22 MMOL/L (ref 20–29)
CREAT SERPL-MCNC: 1.66 MG/DL (ref 0.76–1.27)
DRUGS UR: NORMAL
ERYTHROCYTE [DISTWIDTH] IN BLOOD BY AUTOMATED COUNT: 12.4 % (ref 11.6–15.4)
GLOBULIN SER CALC-MCNC: 2.3 G/DL (ref 1.5–4.5)
GLUCOSE SERPL-MCNC: 100 MG/DL (ref 65–99)
HCT VFR BLD AUTO: 43.7 % (ref 37.5–51)
HDLC SERPL-MCNC: 38 MG/DL
HGB BLD-MCNC: 14.7 G/DL (ref 13–17.7)
LDLC SERPL CALC-MCNC: 131 MG/DL (ref 0–99)
MCH RBC QN AUTO: 32.9 PG (ref 26.6–33)
MCHC RBC AUTO-ENTMCNC: 33.6 G/DL (ref 31.5–35.7)
MCV RBC AUTO: 98 FL (ref 79–97)
PLATELET # BLD AUTO: 176 X10E3/UL (ref 150–450)
POTASSIUM SERPL-SCNC: 4.9 MMOL/L (ref 3.5–5.2)
PROT SERPL-MCNC: 6.9 G/DL (ref 6–8.5)
RBC # BLD AUTO: 4.47 X10E6/UL (ref 4.14–5.8)
SODIUM SERPL-SCNC: 141 MMOL/L (ref 134–144)
TRIGL SERPL-MCNC: 138 MG/DL (ref 0–149)
TSH SERPL DL<=0.005 MIU/L-ACNC: 1.06 UIU/ML (ref 0.45–4.5)
VLDLC SERPL CALC-MCNC: 25 MG/DL (ref 5–40)
WBC # BLD AUTO: 5.3 X10E3/UL (ref 3.4–10.8)

## 2021-08-31 NOTE — PROGRESS NOTES
HISTORY OF PRESENT ILLNESS  Cristofer Anderson is a 80 y.o. male. HPI     Last here 5/24/21. Pt is here to f/u on chronic conditions.   For cardiomyopathy  BP today is 125/75  No longer on lisinopril 5mg daily to protect his kidneys d/t high k   He is now taking coreg 6.25 mg BID for cardiomyopathy     Wt is 179 lbs-- stable x lov     Reviewed labs      Previously, I referred the pt to Dr. Justo Dewey (rheum)  However, he did not schedule an appt with this rheum not interested in following up now    Pt follows with Dr. Maggy Hoff (cardio) for decreased EF cardiomyopathy  Reviewed note 8/06/21:  1. Nonischemic cardiomyopathy ([address]  New onset cardiomyopathy, EF 35-40% on echo done 6/2021  Lexiscan stress test done 7/2021 without evidence of ischemia  On carvedilol 3.125 mg BID; will increase brooks to 6.25 mg BID. avoid ACEI/ARB/ARNI due to hyperK+ with ACE-I  Repeat echo in 3 months  2. Dilated aortic root (HCC)  Dilated aortic root, 4.3 cm on echo  Obtain CAP CTA  3. Mixed hyperlipidemia   in 8/2020  Continue low fat, low cholesterol diet  F/u with me in 3 months with echo   Reviewed echo 6/21/21:  · LV: Estimated LVEF is 35 - 40%. Normal cavity size and wall thickness. Reviewed stress test 7/23/21:  · Baseline ECG: Sinus bradycardia. · There was no ST segment deviation noted during stress. · SPECT: Left ventricular function post-stress was normal. Calculated ejection fraction is 83%. There is no evidence of transient ischemic dilation (TID). · SPECT: Left ventricular perfusion is normal. Myocardial perfusion imaging supports a low risk stress test.    Pt follows with Dr. Leonardo Medina (uro) for prostate cancer  Last PSA 0.3- rising  Discussed I think this is important to follow up on and though he has had prostate removed the cancer can still recur   Pt is aware and does not want to follow up at this time   Recall last note 3/30/16:  PSA undetectable, pt doing well no further UTI.    Recall pt's prostate is removed     Should take over-the-counter vitamin D      Takes pepcid prn works well      Continues on xyzol qhs for allergies - t currently well controlled  Recall pt is allergic to fungus and pet dander     No longer uses lactulose prn - he has been eating collards which has helped sxs     I give him tramadol for his back and hand pain  Now he is getting 90 tablets q3 months- about 2 per day  Will take an extra tab when his hands are bothering him a lot  Pt knows not to give this to anyone else  This helps with his pain  Not sedating or overly constipationg   Pt also has percocet to use prn (rarely - takes, bottle lasted >1 years) for more severe back pain - needs refill   Pain contract signed on 8/20, will update today 09/01/21  Urine drug screen complete 2/25/21      echo  showed decrease EF of 35 to 40%     Has CKD I monitor his renal function creatinine runs around 1.5-1.7 baseline     ACP not on file. SDM is his son Hetal Glaser).     Recall the patient has OA in his hands which bothers him the most, also some in the neck. He continues to take tramadol for this 6 tabs per day. Gets 540 tabs for 3 months. --i prescribe this now, we addressed this last time and he stated he still gets pain and does not think the tramadol does anything but takes it for maintenance anyway       Recall he has a dx of prostate cancer since January 2013 , sees calin annually and last saw him in January 2015--states he was released from care at that time.    Recall the patient has h/o ckd, last cr 1.5 continues to be stable. prior was 1.77, uns 1.5-1.77  Recall that he Used to follow with rheumatologist dr Patsy Carl years ago --currently not seeing anyone. Recall the patient has hansens and a h/o stomach ulcer, sees ry for this.  Sx controlled with prilosec bid , he states he saw him last year or the year before, sx well controlled, only one or 2 breakthrough episodes.        PREVENTIVE:    Colonoscopy: 10/22/13, Dr. Leonor Jurado, repeat 10 years  EGD: 4/16/16, Dr Martin Edwards, repeat 3 years, cancelled has not had any problems  PSA: 1/13 dx'd with prostate cancer, 3/16, 12/17 0.1, 6/18 0.1, 12/18 0.2, followed by Dr. Mookie Casey, 12/19 0.3  AAA screen: 10/10, negative  Tdap: declines further   Pneumovax: 12/20/2019  Prevnar 13: 6/14/2017  Shingrix: got first dose  Flu shot: 11/20, will get today 09/01/21  Eye exam: Dr. Ranjit Baptiste, 3/21  Lipids: 8/21   A1c: 6/16 5.9, 12/16 5.7  Pain contract: signed 8/25/20, will update today 09/01/21  Urine drug screen:  c/w tramadol, 2/25/21  Covid: both (Natividad Letty)    Patient Active Problem List    Diagnosis Date Noted    Acute systolic congestive heart failure ([address] 08/05/2021    Dilated aortic root ([address] 08/05/2021    Mixed hyperlipidemia 08/05/2021    Primary osteoarthritis of both hands 06/13/2018    Prostate cancer ([address] 02/18/2013    Chronic kidney disease, stage III (moderate) ([address] 06/28/2011    Vitamin D deficiency 06/27/2011    DJD (degenerative joint disease), multiple sites 03/09/2010    GERD (gastroesophageal reflux disease) 03/09/2010    History of peptic ulcer disease 03/09/2010    Benign prostatic hyperplasia 03/09/2010    Perennial allergic rhinitis 03/09/2010     Current Outpatient Medications   Medication Sig Dispense Refill    levocetirizine (XYZAL) 5 mg tablet TAKE ONE TABLET BY MOUTH EVERY NIGHT AT BEDTIME 90 Tablet 0    carvediloL (COREG) 6.25 mg tablet Take 1 Tablet by mouth two (2) times a day.  60 Tablet 5    ProAir RespiClick 90 mcg/actuation breath activated inhaler INHALE TWO PUFFS BY MOUTH EVERY 4 HOURS AS NEEDED FOR WHEEZING OR COUGH (Patient not taking: Reported on 9/1/2021) 1 Inhaler 0     Past Surgical History:   Procedure Laterality Date    ENDOSCOPY, COLON, DIAGNOSTIC  2006    by [facility]; normal    HX CATARACT REMOVAL  12/28/2014    both eyes    HX COLONOSCOPY  10/22/2013    HX ENDOSCOPY      Followed by Dr. Martin Edwards (multiple)    HX HEENT  0201'N septoplasty    HX ORTHOPAEDIC  2000    right rotator cuff repair    HX ORTHOPAEDIC      right knee total replacement    HX PROSTATECTOMY  2010    green light laser by Dr. Ruiz Blunt HX UROLOGICAL  5/7/13    CYSTOSCOPY WITH OPTICAL INTERNAL URETHROTOMY, AND CRYOABLATION OF PROSTATE      Lab Results   Component Value Date/Time    WBC 5.3 08/13/2021 09:03 AM    HGB 14.7 08/13/2021 09:03 AM    HCT 43.7 08/13/2021 09:03 AM    PLATELET 238 28/03/2026 09:03 AM    MCV 98 (H) 08/13/2021 09:03 AM     Lab Results   Component Value Date/Time    Cholesterol, total 194 08/13/2021 09:03 AM    HDL Cholesterol 38 (L) 08/13/2021 09:03 AM    LDL, calculated 131 (H) 08/13/2021 09:03 AM    LDL, calculated 131 (H) 08/27/2020 10:36 AM    Triglyceride 138 08/13/2021 09:03 AM    CHOL/HDL Ratio 3.6 06/25/2010 08:36 AM     Lab Results   Component Value Date/Time    GFR est non-AA 37 (L) 08/13/2021 09:03 AM    GFR est AA 43 (L) 08/13/2021 09:03 AM    Creatinine 1.66 (H) 08/13/2021 09:03 AM    BUN 29 (H) 08/13/2021 09:03 AM    Sodium 141 08/13/2021 09:03 AM    Potassium 4.9 08/13/2021 09:03 AM    Chloride 106 08/13/2021 09:03 AM    CO2 22 08/13/2021 09:03 AM        Review of Systems   Respiratory: Negative for shortness of breath. Cardiovascular: Negative for chest pain. Physical Exam  Constitutional:       General: He is not in acute distress. Appearance: Normal appearance. He is not ill-appearing, toxic-appearing or diaphoretic. HENT:      Head: Normocephalic and atraumatic. Right Ear: External ear normal.      Left Ear: External ear normal.   Eyes:      General:         Right eye: No discharge. Left eye: No discharge. Conjunctiva/sclera: Conjunctivae normal.      Pupils: Pupils are equal, round, and reactive to light. Cardiovascular:      Rate and Rhythm: Normal rate and regular rhythm. Heart sounds: Murmur heard. No friction rub. No gallop. Pulmonary:      Effort: No respiratory distress. Breath sounds: Normal breath sounds. No wheezing or rales. Chest:      Chest wall: No tenderness. Musculoskeletal:         General: Normal range of motion. Cervical back: Normal range of motion and neck supple. Right lower leg: No edema. Left lower leg: No edema. Skin:     General: Skin is warm and dry. Neurological:      Mental Status: He is alert and oriented to person, place, and time. Mental status is at baseline. Coordination: Coordination normal.      Gait: Gait normal.   Psychiatric:         Mood and Affect: Mood normal.         Behavior: Behavior normal.         ASSESSMENT and PLAN    ICD-10-CM ICD-9-CM    1. Acute systolic congestive heart failure (HCC)     Decreased EF on recent echo no symptoms    Now on Coreg    Repeat echo scheduled for November to look for improvement     I50.21 428.21      428.0    2. Stage 3a chronic kidney disease (HCC)     Creatinine runs around 1.4-1.6 baseline stable on recent labs continue to monitor     N18.31 585.3    3. Dilated aortic root ([address]       Monitoring on echo seeing cardiology on Coreg now I77.810 447.71    4. Prostate cancer [facility])       Had history of prostatectomy previously saw urology has had mild biochemical recurrence patient declines any further evaluation or treatment C61 [id number]    5. Mixed hyperlipidemia       Mild LDL elevation stress test was negative continue with diet no statin for now     E78.2 272.2    6. Gastroesophageal reflux disease without esophagitis       Pepcid as needed K21.9 530.81    7. Perennial allergic rhinitis       Controlled with Xyzal J30.89 477.8         Depression screen reviewed and negative     Scribed by Jerrell Dickinson, as dictated by Dr. Keri Sampson. Current diagnosis and concerns discussed with pt at length. Pt understands risks and benefits or current treatment plan and medications, and accepts the treatment and medication with any possible risks.  Pt asks appropriate questions, which were answered. Pt was instructed to call with any concerns or problems. I have reviewed the note documented by the scribe. The services provided are my own.   The documentation is accurate

## 2021-09-01 ENCOUNTER — OFFICE VISIT (OUTPATIENT)
Dept: INTERNAL MEDICINE CLINIC | Age: 86
End: 2021-09-01
Payer: MEDICARE

## 2021-09-01 VITALS
OXYGEN SATURATION: 96 % | SYSTOLIC BLOOD PRESSURE: 125 MMHG | BODY MASS INDEX: 25.62 KG/M2 | RESPIRATION RATE: 16 BRPM | HEIGHT: 70 IN | HEART RATE: 73 BPM | TEMPERATURE: 97.8 F | DIASTOLIC BLOOD PRESSURE: 75 MMHG | WEIGHT: 179 LBS

## 2021-09-01 DIAGNOSIS — E78.2 MIXED HYPERLIPIDEMIA: ICD-10-CM

## 2021-09-01 DIAGNOSIS — J30.89 PERENNIAL ALLERGIC RHINITIS: ICD-10-CM

## 2021-09-01 DIAGNOSIS — I50.21 ACUTE SYSTOLIC CONGESTIVE HEART FAILURE (HCC): Primary | ICD-10-CM

## 2021-09-01 DIAGNOSIS — K21.9 GASTROESOPHAGEAL REFLUX DISEASE WITHOUT ESOPHAGITIS: ICD-10-CM

## 2021-09-01 DIAGNOSIS — I77.810 DILATED AORTIC ROOT (HCC): ICD-10-CM

## 2021-09-01 DIAGNOSIS — C61 PROSTATE CANCER (HCC): ICD-10-CM

## 2021-09-01 DIAGNOSIS — N18.31 STAGE 3A CHRONIC KIDNEY DISEASE (HCC): ICD-10-CM

## 2021-09-01 DIAGNOSIS — Z23 NEEDS FLU SHOT: ICD-10-CM

## 2021-09-01 PROCEDURE — 1101F PT FALLS ASSESS-DOCD LE1/YR: CPT | Performed by: INTERNAL MEDICINE

## 2021-09-01 PROCEDURE — 90694 VACC AIIV4 NO PRSRV 0.5ML IM: CPT | Performed by: INTERNAL MEDICINE

## 2021-09-01 PROCEDURE — G0008 ADMIN INFLUENZA VIRUS VAC: HCPCS | Performed by: INTERNAL MEDICINE

## 2021-09-01 PROCEDURE — G8419 CALC BMI OUT NRM PARAM NOF/U: HCPCS | Performed by: INTERNAL MEDICINE

## 2021-09-01 PROCEDURE — G8510 SCR DEP NEG, NO PLAN REQD: HCPCS | Performed by: INTERNAL MEDICINE

## 2021-09-01 PROCEDURE — 99214 OFFICE O/P EST MOD 30 MIN: CPT | Performed by: INTERNAL MEDICINE

## 2021-09-01 PROCEDURE — G8427 DOCREV CUR MEDS BY ELIG CLIN: HCPCS | Performed by: INTERNAL MEDICINE

## 2021-09-01 PROCEDURE — G8536 NO DOC ELDER MAL SCRN: HCPCS | Performed by: INTERNAL MEDICINE

## 2021-09-24 RX ORDER — LACTULOSE 10 G/15ML
SOLUTION ORAL; RECTAL
Qty: 473 ML | Refills: 1 | Status: SHIPPED | OUTPATIENT
Start: 2021-09-24

## 2021-11-08 ENCOUNTER — ANCILLARY PROCEDURE (OUTPATIENT)
Dept: CARDIOLOGY CLINIC | Age: 86
End: 2021-11-08
Payer: MEDICARE

## 2021-11-08 VITALS
WEIGHT: 179 LBS | DIASTOLIC BLOOD PRESSURE: 79 MMHG | HEIGHT: 70 IN | BODY MASS INDEX: 25.62 KG/M2 | SYSTOLIC BLOOD PRESSURE: 125 MMHG

## 2021-11-08 DIAGNOSIS — I42.8 NONISCHEMIC CARDIOMYOPATHY (HCC): ICD-10-CM

## 2021-11-08 PROCEDURE — 93306 TTE W/DOPPLER COMPLETE: CPT | Performed by: INTERNAL MEDICINE

## 2021-11-09 ENCOUNTER — OFFICE VISIT (OUTPATIENT)
Dept: CARDIOLOGY CLINIC | Age: 86
End: 2021-11-09
Payer: MEDICARE

## 2021-11-09 VITALS
HEIGHT: 70 IN | SYSTOLIC BLOOD PRESSURE: 122 MMHG | DIASTOLIC BLOOD PRESSURE: 70 MMHG | RESPIRATION RATE: 18 BRPM | WEIGHT: 180.7 LBS | HEART RATE: 59 BPM | BODY MASS INDEX: 25.87 KG/M2 | OXYGEN SATURATION: 97 %

## 2021-11-09 DIAGNOSIS — I42.8 NONISCHEMIC CARDIOMYOPATHY (HCC): Primary | ICD-10-CM

## 2021-11-09 DIAGNOSIS — E78.2 MIXED HYPERLIPIDEMIA: ICD-10-CM

## 2021-11-09 DIAGNOSIS — I77.810 DILATED AORTIC ROOT (HCC): ICD-10-CM

## 2021-11-09 LAB
ECHO AO ASC DIAM: 3.65 CM
ECHO AO ROOT DIAM: 4.26 CM
ECHO AV AREA PEAK VELOCITY: 2.78 CM2
ECHO AV AREA/BSA PEAK VELOCITY: 1.4 CM2/M2
ECHO AV PEAK GRADIENT: 9.19 MMHG
ECHO AV PEAK VELOCITY: 151.57 CM/S
ECHO LA AREA 4C: 15.54 CM2
ECHO LA MAJOR AXIS: 3.47 CM
ECHO LA MINOR AXIS: 1.74 CM
ECHO LA VOL 2C: 46.69 ML (ref 18–58)
ECHO LA VOL 4C: 43.81 ML (ref 18–58)
ECHO LA VOL BP: 55.31 ML (ref 18–58)
ECHO LA VOL/BSA BIPLANE: 27.79 ML/M2 (ref 16–28)
ECHO LA VOLUME INDEX A2C: 23.46 ML/M2 (ref 16–28)
ECHO LA VOLUME INDEX A4C: 22.02 ML/M2 (ref 16–28)
ECHO LV E' LATERAL VELOCITY: 10.33 CM/S
ECHO LV E' SEPTAL VELOCITY: 7.49 CM/S
ECHO LV INTERNAL DIMENSION DIASTOLIC: 4.27 CM (ref 4.2–5.9)
ECHO LV INTERNAL DIMENSION SYSTOLIC: 2.38 CM
ECHO LV IVSD: 0.93 CM (ref 0.6–1)
ECHO LV MASS 2D: 125.6 G (ref 88–224)
ECHO LV MASS INDEX 2D: 63.1 G/M2 (ref 49–115)
ECHO LV POSTERIOR WALL DIASTOLIC: 0.91 CM (ref 0.6–1)
ECHO LVOT DIAM: 2.05 CM
ECHO LVOT PEAK GRADIENT: 6.57 MMHG
ECHO LVOT PEAK VELOCITY: 128.12 CM/S
ECHO LVOT SV: 104.7 ML
ECHO LVOT VTI: 31.87 CM
ECHO MV A VELOCITY: 89.33 CM/S
ECHO MV E DECELERATION TIME (DT): 313.91 MS
ECHO MV E VELOCITY: 65.41 CM/S
ECHO MV E/A RATIO: 0.73
ECHO MV E/E' LATERAL: 6.33
ECHO MV E/E' RATIO (AVERAGED): 7.53
ECHO MV E/E' SEPTAL: 8.73
ECHO RV TAPSE: 2.12 CM (ref 1.5–2)
LA VOL DISK BP: 52.44 ML (ref 18–58)
LVOT MG: 4.26 MMHG

## 2021-11-09 PROCEDURE — 99213 OFFICE O/P EST LOW 20 MIN: CPT | Performed by: INTERNAL MEDICINE

## 2021-11-09 PROCEDURE — G8510 SCR DEP NEG, NO PLAN REQD: HCPCS | Performed by: INTERNAL MEDICINE

## 2021-11-09 PROCEDURE — G8536 NO DOC ELDER MAL SCRN: HCPCS | Performed by: INTERNAL MEDICINE

## 2021-11-09 PROCEDURE — G8419 CALC BMI OUT NRM PARAM NOF/U: HCPCS | Performed by: INTERNAL MEDICINE

## 2021-11-09 PROCEDURE — 1101F PT FALLS ASSESS-DOCD LE1/YR: CPT | Performed by: INTERNAL MEDICINE

## 2021-11-09 PROCEDURE — G8427 DOCREV CUR MEDS BY ELIG CLIN: HCPCS | Performed by: INTERNAL MEDICINE

## 2021-11-09 PROCEDURE — 93000 ELECTROCARDIOGRAM COMPLETE: CPT | Performed by: INTERNAL MEDICINE

## 2021-11-09 RX ORDER — TRAMADOL HYDROCHLORIDE 50 MG/1
50 TABLET ORAL 2 TIMES DAILY
COMMUNITY
Start: 2021-10-19 | End: 2021-12-01 | Stop reason: SDUPTHER

## 2021-11-09 NOTE — PROGRESS NOTES
Chief Complaint   Patient presents with    Cardiomyopathy     3 Month F/U- Discuss Echocardiogram Results    Hyperlipidemia     Visit Vitals  /70 (BP 1 Location: Left arm, BP Patient Position: Sitting, BP Cuff Size: Adult)   Pulse (!) 59   Resp 18   Ht 5' 10\" (1.778 m)   Wt 180 lb 11.2 oz (82 kg)   SpO2 97%   BMI 25.93 kg/m²       1. Have you been to the ER, urgent care clinic since your last visit? Hospitalized since your last visit? No    2. Have you seen or consulted any other health care providers outside of the [address] [place] [address] since your last visit? Include any pap smears or colon screening.  No

## 2021-11-09 NOTE — LETTER
11/9/2021    Patient: Funmi Buckley   YOB: 1935   Date of Visit: 11/9/2021     Tree De Santiago MD  [address]  [facility]  [address] [facility] [address]    Dear Tree De Santiago MD,      Thank you for referring Mr. Ulyses Galeazzi to [address] [place] [address] for evaluation. My notes for this consultation are attached. If you have questions, please do not hesitate to call me. I look forward to following your patient along with you.       Sincerely,    Savita Nj MD

## 2021-11-09 NOTE — PROGRESS NOTES
Enrique Benavides, [facility]    Subjective/HPI:     Rexford Bernheim is a 80 y.o. male is here for routine f/u. He has a PMHx of non-ischemic cardiomyopathy, HLD, aortic root dilation, hx of prostate cancer. Had echocardiogram done yesterday for reassessment of NICM. Feels well. Denies new complaints of chest pains, dizziness, orthopnea, PND or edema. Current Outpatient Medications on File Prior to Visit   Medication Sig Dispense Refill    lactulose (CHRONULAC) 10 gram/15 mL solution TAKE 5ML BY MOUTH THREE TIMES A  mL 1    levocetirizine (XYZAL) 5 mg tablet TAKE ONE TABLET BY MOUTH EVERY NIGHT AT BEDTIME 90 Tablet 0    carvediloL (COREG) 6.25 mg tablet Take 1 Tablet by mouth two (2) times a day. 60 Tablet 5    ProAir RespiClick 90 mcg/actuation breath activated inhaler INHALE TWO PUFFS BY MOUTH EVERY 4 HOURS AS NEEDED FOR WHEEZING OR COUGH 1 Inhaler 0    traMADoL (ULTRAM) 50 mg tablet Take 50 mg by mouth two (2) times a day. No current facility-administered medications on file prior to visit. Review of Symptoms:    Review of Systems   Constitutional: Negative for chills, fever and weight loss. HENT: Negative for nosebleeds. Eyes: Negative for blurred vision and double vision. Respiratory: Negative for cough, shortness of breath and wheezing. Cardiovascular: Negative for chest pain, palpitations, orthopnea, leg swelling and PND. Skin: Negative for rash. Neurological: Negative for dizziness and loss of consciousness. Physical Exam:      General: Well developed, in no acute distress, cooperative and alert  Heart:  reg rate and rhythm; normal S1/S2; no murmurs, no gallops or rubs. Respiratory: Clear bilaterally x 4, no wheezing or rales  Extremities:  Normal cap refill, no cyanosis, atraumatic. No edema.   Vascular: 2+ pulses symmetric in all extremities    Vitals:    11/09/21 1107   BP: 122/70   BP 1 Location: Left arm   BP Patient Position: Sitting   BP Cuff Size: Adult   Pulse: (!) 59   Resp: 18   Height: 5' 10\" (1.778 m)   Weight: 180 lb 11.2 oz (82 kg)   SpO2: 97%       ECG done today shows sinus rhythm     Assessment:       ICD-10-CM ICD-9-CM    1. Nonischemic cardiomyopathy (HCC)  I42.8 425.4 AMB POC EKG ROUTINE W/ 12 LEADS, INTER & REP   2. Dilated aortic root (HCC)  I77.810 447.71 AMB POC EKG ROUTINE W/ 12 LEADS, INTER & REP   3. Mixed hyperlipidemia  E78.2 272.2 AMB POC EKG ROUTINE W/ 12 LEADS, INTER & REP        Plan:     1. Nonischemic cardiomyopathy ([address]  New onset cardiomyopathy, now resolved  Echo done 11/2021 with improved LVEF 55-60%, mod aortic root dilatation, 4.4 cm  Lexiscan stress test done 7/2021 without evidence of ischemia  On carvedilol; avoid ACEi/ARB due to hyperkalemia     2. Dilated aortic root (HCC)  Dilated aortic root, 4.0 cm on echo done 11/2021  Continue anti-hypertensive therapy     3. Mixed hyperlipidemia   in 8/2020  Continue low fat, low cholesterol diet    F/u with Dr. Madison Schneider in 6 months    David Wilson NP       [facility]             Patient seen, examined by me personally. Plan discussed as detailed. Agree with note as outlined by  NP with modifications as noted. My independent physical exam reveals : Physical Exam  Vitals and nursing note reviewed. Constitutional:       Appearance: Normal appearance. Cardiovascular:      Rate and Rhythm: Normal rate and regular rhythm. Heart sounds: Normal heart sounds. Pulmonary:      Breath sounds: Normal breath sounds. Musculoskeletal:      Right lower leg: No edema. Left lower leg: No edema. Skin:     General: Skin is warm. Neurological:      Mental Status: He is alert and oriented to person, place, and time. Psychiatric:         Mood and Affect: Mood normal.          No additional findings noted. Agree with plan as outlined above with modifications as noted.      Yesi Sauceda MD

## 2021-11-10 ENCOUNTER — TELEPHONE (OUTPATIENT)
Dept: CARDIOLOGY CLINIC | Age: 86
End: 2021-11-10

## 2021-11-10 NOTE — TELEPHONE ENCOUNTER
Verified Patient with two identifiers  Spoke with patient regarding results. Patient voiced understanding.

## 2021-11-10 NOTE — TELEPHONE ENCOUNTER
----- Message from Caridad Beck NP sent at 11/10/2021  4:19 PM EST -----  Please call the patient and inform that echocardiogram is normal, ejection fraction is 55-60%. No concern for heart failure at this time. Valves working appropriately.     Thanks,  [employer]

## 2021-11-19 DIAGNOSIS — G89.4 CHRONIC PAIN SYNDROME: ICD-10-CM

## 2021-11-19 RX ORDER — LEVOCETIRIZINE DIHYDROCHLORIDE 5 MG/1
TABLET, FILM COATED ORAL
Qty: 90 TABLET | Refills: 0 | Status: SHIPPED | OUTPATIENT
Start: 2021-11-19 | End: 2022-02-23

## 2021-11-30 NOTE — PROGRESS NOTES
HISTORY OF PRESENT ILLNESS  Carrie Bowens is a 80 y.o. male. HPI     Last here 9/01/21. Pt is here to f/u on chronic conditions.     BP today is 128/67  BP at home 119/65  No longer on lisinopril 5mg daily to protect his kidneys d/t high k   He is now taking coreg 6.25 mg BID for cardiomyopathy     Wt today is 188 lbs, up 9 lbs x lov     Reviewed labs      Previously, I referred the pt to Dr. Nohelia Roman (rheum) for history of osteoarthritis he has not decided to go     Pt follows with Dr. Liya Bowden (cardio) for decreased EF cardiomyopathy  Reviewed note 11/09/21:  1. Nonischemic cardiomyopathy ([address]  New onset cardiomyopathy, now resolved  Echo done 11/2021 with improved LVEF 55-60%, mod aortic root dilatation, 4.4 cm  Lexiscan stress test done 7/2021 without evidence of ischemia  On carvedilol; avoid ACEi/ARB due to hyperkalemia  2. Dilated aortic root (HCC)  Dilated aortic root, 4.0 cm on echo done 11/2021  Continue anti-hypertensive therapy  3. Mixed hyperlipidemia   in 8/2020  Continue low fat, low cholesterol diet  F/u with Dr. Liya Bowden in 6 months  Archie Doss NP   Reviewed echo 11/08/21:  · LV: Estimated LVEF is 55 - 60%. Visually measured ejection fraction. Normal cavity size, wall thickness, systolic function (ejection fraction normal) and diastolic function. Wall motion: normal.  · LA: Left Atrium volume index is 27.79 mL/m2. · AO: Mild ascending aorta dilatation. Recall echo 6/21/21:  · LV: Estimated LVEF is 35 - 40%. Normal cavity size and wall thickness.   Recall stress test 7/23/21:  ·   · SPECT: Left ventricular perfusion is normal. Myocardial perfusion imaging supports a low risk stress test.         Pt follows with Dr. Farzaneh Leblanc ([facility]) for prostate cancer  Last PSA 0.3- rising I have discussed this multiple times with patient he is not interested in following up with urology no longer checking his PSAs I have discussed that this means he likely has biochemical recurrence of his prostate cancer he is aware not interested in follow-up  Recall last note 3/30/16:  PSA undetectable, pt doing well no further UTI. Recall pt's prostate is removed     Should take over-the-counter vitamin D      No longer taking pepcid-- no current issues with heartburn      Continues on xyzol qhs for allergies - currently well controlled  Recall pt is allergic to fungus and pet dander     No longer uses lactulose prn - he has been eating collards which has helped sxs     I give him tramadol for his back and hand pain  Now he is getting 90 tablets q3 months- about 2 per day  Will take an extra tab when his hands are bothering him a lot primarily when it is cold out  Pt knows not to give this to anyone else  This helps with his pain  Not sedating or overly constipating  Pt also has percocet to use prn in the past he no longer has been using this   Pain contract signed on 09/01/21  Urine drug screen complete 8/13/21       Has mild CKD  Has CKD I monitor his renal function creatinine runs around 1.5-1.7 baseline     ACP not on file. SDM is his son Roscoe Chapman.     Recall the patient has OA in his hands which bothers him the most, also some in the neck. He continues to take tramadol for this 6 tabs per day. Gets 540 tabs for 3 months. --i prescribe this now, we addressed this last time and he stated he still gets pain and does not think the tramadol does anything but takes it for maintenance anyway       Recall he has a dx of prostate cancer since January 2013 , sees calin annually and last saw him in January 2015--states he was released from care at that time.    Recall the patient has h/o ckd, last cr 1.5 continues to be stable. prior was 1.77, uns 1.5-1.77  Recall that he Used to follow with rheumatologist dr Isabela Cordon years ago --currently not seeing anyone. Recall the patient has hansens and a h/o stomach ulcer, sees ry for this.  Sx controlled with prilosec bid , he states he saw him last year or the year before, sx well controlled, only one or 2 breakthrough episodes.        PREVENTIVE:    Colonoscopy: 10/22/13, Dr. Kristin Tatum, repeat 10 years  EGD: 4/16/16, Dr Kristin Tatum, repeat 3 years, cancelled has not had any problems  PSA: 1/13 dx'd with prostate cancer, 3/16, 12/17 0.1, 6/18 0.1, 12/18 0.2, followed by Dr. Farzaneh Leblanc, 12/19 0.3  AAA screen: 10/10, negative  Tdap: declines further   Pneumovax: 12/20/2019  Prevnar 13: 6/14/2017  Shingrix: got first dose  Flu shot: 09/01/21  Eye exam: Dr. Jose Bowman   A1c: 6/16 5.9, 12/16 5.7  Pain contract: 09/01/21  Urine drug screen: c/w tramadol, 8/13/21  Covid: both (moderna), discussed due for booster    Patient Active Problem List    Diagnosis Date Noted    Opioid use, unspecified with unspecified opioid-induced disorder 87/18/9710    Acute systolic congestive heart failure ([facility] 08/05/2021    Dilated aortic root ([facility] 08/05/2021    Mixed hyperlipidemia 08/05/2021    Primary osteoarthritis of both hands 06/13/2018    Prostate cancer ([facility] 02/18/2013    Chronic kidney disease, stage III (moderate) ([facility] 06/28/2011    Vitamin D deficiency 06/27/2011    DJD (degenerative joint disease), multiple sites 03/09/2010    GERD (gastroesophageal reflux disease) 03/09/2010    History of peptic ulcer disease 03/09/2010    Benign prostatic hyperplasia 03/09/2010    Perennial allergic rhinitis 03/09/2010     Current Outpatient Medications   Medication Sig Dispense Refill    traMADoL (ULTRAM) 50 mg tablet Take 1 Tablet by mouth two (2) times a day for 30 days. Max Daily Amount: 100 mg. 90 Tablet 2    levocetirizine (XYZAL) 5 mg tablet TAKE ONE TABLET BY MOUTH EVERY NIGHT AT BEDTIME 90 Tablet 0    lactulose (CHRONULAC) 10 gram/15 mL solution TAKE 5ML BY MOUTH THREE TIMES A DAY (Patient taking differently: daily as needed.) 473 mL 1    carvediloL (COREG) 6.25 mg tablet Take 1 Tablet by mouth two (2) times a day.  60 Tablet 5     Past Surgical History: Procedure Laterality Date    ENDOSCOPY, COLON, DIAGNOSTIC  2006    by [facility]; normal    HX CATARACT REMOVAL  12/28/2014    both eyes    HX COLONOSCOPY  10/22/2013    HX ENDOSCOPY      Followed by Dr. Tony Walker (multiple)    HX HEENT  1970's    septoplasty    HX ORTHOPAEDIC  2000    right rotator cuff repair    HX ORTHOPAEDIC      right knee total replacement    HX PROSTATECTOMY  2010    green light laser by Dr. Demi Lin HX UROLOGICAL  5/7/13    CYSTOSCOPY WITH OPTICAL INTERNAL URETHROTOMY, AND CRYOABLATION OF PROSTATE      Lab Results   Component Value Date/Time    WBC 5.3 08/13/2021 09:03 AM    HGB 14.7 08/13/2021 09:03 AM    HCT 43.7 08/13/2021 09:03 AM    PLATELET 491 06/37/4265 09:03 AM    MCV 98 (H) 08/13/2021 09:03 AM     Lab Results   Component Value Date/Time    Cholesterol, total 194 08/13/2021 09:03 AM    HDL Cholesterol 38 (L) 08/13/2021 09:03 AM    LDL, calculated 131 (H) 08/13/2021 09:03 AM    LDL, calculated 131 (H) 08/27/2020 10:36 AM    Triglyceride 138 08/13/2021 09:03 AM    CHOL/HDL Ratio 3.6 06/25/2010 08:36 AM     Lab Results   Component Value Date/Time    GFR est non-AA 37 (L) 08/13/2021 09:03 AM    GFR est AA 43 (L) 08/13/2021 09:03 AM    Creatinine 1.66 (H) 08/13/2021 09:03 AM    BUN 29 (H) 08/13/2021 09:03 AM    Sodium 141 08/13/2021 09:03 AM    Potassium 4.9 08/13/2021 09:03 AM    Chloride 106 08/13/2021 09:03 AM    CO2 22 08/13/2021 09:03 AM        Review of Systems   Respiratory: Negative for shortness of breath. Cardiovascular: Negative for chest pain. Skin: Positive for rash. Rash hips and butt       Physical Exam  Constitutional:       General: He is not in acute distress. Appearance: Normal appearance. He is not ill-appearing, toxic-appearing or diaphoretic. HENT:      Head: Normocephalic and atraumatic. Right Ear: External ear normal.      Left Ear: External ear normal.   Eyes:      General:         Right eye: No discharge.          Left eye: No discharge. Conjunctiva/sclera: Conjunctivae normal.      Pupils: Pupils are equal, round, and reactive to light. Cardiovascular:      Rate and Rhythm: Normal rate and regular rhythm. Heart sounds: No murmur heard. No friction rub. No gallop. Pulmonary:      Effort: No respiratory distress. Breath sounds: Normal breath sounds. No wheezing or rales. Chest:      Chest wall: No tenderness. Musculoskeletal:         General: Normal range of motion. Cervical back: Normal range of motion and neck supple. Right lower leg: No edema. Left lower leg: No edema. Skin:     General: Skin is warm and dry. Findings: Rash present. Comments: Rash on butt, hips, abd, back   Neurological:      Mental Status: He is alert and oriented to person, place, and time. Mental status is at baseline. Coordination: Coordination normal.      Gait: Gait normal.   Psychiatric:         Mood and Affect: Mood normal.         Behavior: Behavior normal.         ASSESSMENT and PLAN    ICD-10-CM ICD-9-CM    1. IFG (impaired fasting glucose)      R73.01 790.21 traMADoL (ULTRAM) 50 mg tablet      METABOLIC PANEL, COMPREHENSIVE   Check A1c prior to follow-up   TOXASSURE SELECT 13 (MW)      HEMOGLOBIN A1C WITH EAG   2. Dilated aortic root (HCC)  I77.810 447.71 traMADoL (ULTRAM) 50 mg tablet      METABOLIC PANEL, COMPREHENSIVE   Currently following with cardiology up-to-date just had echocardiogram this last month   TOXASSURE SELECT 13 (MW)      HEMOGLOBIN A1C WITH EAG   3. Prostate cancer (HCC)  C61 185 traMADoL (ULTRAM) 50 mg tablet      METABOLIC PANEL, COMPREHENSIVE   Has mild biochemical recurrence not interested in further evaluation or treatment   TOXASSURE SELECT 13 (MW)      HEMOGLOBIN A1C WITH EAG   4.  Acute systolic congestive heart failure (HCC)  I50.21 428.21 traMADoL (ULTRAM) 50 mg tablet     238.0 METABOLIC PANEL, COMPREHENSIVE   Now on Coreg EF is improved to 55 to 60% on recent echo doing well   TOXASSURE SELECT 13 (MW)      HEMOGLOBIN A1C WITH EAG   5. Stage 3a chronic kidney disease (HCC)  N18.31 585.3 traMADoL (ULTRAM) 50 mg tablet      METABOLIC PANEL, COMPREHENSIVE   No longer on lisinopril monitoring renal function creatinine rise around 1.5-1.7 baseline   TOXASSURE SELECT 13 (MW)      HEMOGLOBIN A1C WITH EAG   6. Gastroesophageal reflux disease without esophagitis  K21.9 530.81 traMADoL (ULTRAM) 50 mg tablet      METABOLIC PANEL, COMPREHENSIVE   Continues on Pepcid sporadically   TOXASSURE SELECT 13 (MW)      HEMOGLOBIN A1C WITH EAG   7. Osteoarthritis of multiple joints, unspecified osteoarthritis type  M15.9 715.89 traMADoL (ULTRAM) 50 mg tablet      METABOLIC PANEL, COMPREHENSIVE   Gets tramadol from me takes 2/day this is actually improved from several years ago when he was taking more occasionally will take a third on a bad day    We will get  for review    Urine drug screen will be repeated prior to follow-up    Pain contract has been signed    No signs of abuse    We will continue to prescribe       TOXASSURE SELECT 13 (MW)      HEMOGLOBIN A1C WITH EAG   8. Mixed hyperlipidemia  E78.2 272.2 traMADoL (ULTRAM) 50 mg tablet      METABOLIC PANEL, COMPREHENSIVE   Diet controlled monitor lipids   TOXASSURE SELECT 13 (MW)      HEMOGLOBIN A1C WITH EAG   9. Opioid use, unspecified with unspecified opioid-induced disorder  F11.99 292.9    See above  305.50    10. Perennial allergic rhinitis     Improved on Xyzal J30.89 477.8         Depression screen reviewed and negative     Scribed by Constance Johnson [facility], as dictated by Dr. Enedelia Turner. Current diagnosis and concerns discussed with pt at length. Pt understands risks and benefits or current treatment plan and medications, and accepts the treatment and medication with any possible risks. Pt asks appropriate questions, which were answered. Pt was instructed to call with any concerns or problems.       I have reviewed the note documented by the scribe. The services provided are my own.   The documentation is accurate

## 2021-12-01 ENCOUNTER — OFFICE VISIT (OUTPATIENT)
Dept: INTERNAL MEDICINE CLINIC | Age: 86
End: 2021-12-01
Payer: MEDICARE

## 2021-12-01 VITALS
RESPIRATION RATE: 16 BRPM | HEART RATE: 60 BPM | TEMPERATURE: 97.5 F | OXYGEN SATURATION: 95 % | WEIGHT: 188 LBS | SYSTOLIC BLOOD PRESSURE: 128 MMHG | BODY MASS INDEX: 26.92 KG/M2 | DIASTOLIC BLOOD PRESSURE: 67 MMHG | HEIGHT: 70 IN

## 2021-12-01 DIAGNOSIS — R73.01 IFG (IMPAIRED FASTING GLUCOSE): Primary | ICD-10-CM

## 2021-12-01 DIAGNOSIS — E78.2 MIXED HYPERLIPIDEMIA: ICD-10-CM

## 2021-12-01 DIAGNOSIS — M15.9 OSTEOARTHRITIS OF MULTIPLE JOINTS, UNSPECIFIED OSTEOARTHRITIS TYPE: ICD-10-CM

## 2021-12-01 DIAGNOSIS — F11.99 OPIOID USE, UNSPECIFIED WITH UNSPECIFIED OPIOID-INDUCED DISORDER (HCC): ICD-10-CM

## 2021-12-01 DIAGNOSIS — N18.31 STAGE 3A CHRONIC KIDNEY DISEASE (HCC): ICD-10-CM

## 2021-12-01 DIAGNOSIS — K21.9 GASTROESOPHAGEAL REFLUX DISEASE WITHOUT ESOPHAGITIS: ICD-10-CM

## 2021-12-01 DIAGNOSIS — I77.810 DILATED AORTIC ROOT (HCC): ICD-10-CM

## 2021-12-01 DIAGNOSIS — J30.89 PERENNIAL ALLERGIC RHINITIS: ICD-10-CM

## 2021-12-01 DIAGNOSIS — C61 PROSTATE CANCER (HCC): ICD-10-CM

## 2021-12-01 DIAGNOSIS — I50.21 ACUTE SYSTOLIC CONGESTIVE HEART FAILURE (HCC): ICD-10-CM

## 2021-12-01 PROCEDURE — G8419 CALC BMI OUT NRM PARAM NOF/U: HCPCS | Performed by: INTERNAL MEDICINE

## 2021-12-01 PROCEDURE — G8510 SCR DEP NEG, NO PLAN REQD: HCPCS | Performed by: INTERNAL MEDICINE

## 2021-12-01 PROCEDURE — 1101F PT FALLS ASSESS-DOCD LE1/YR: CPT | Performed by: INTERNAL MEDICINE

## 2021-12-01 PROCEDURE — 99214 OFFICE O/P EST MOD 30 MIN: CPT | Performed by: INTERNAL MEDICINE

## 2021-12-01 PROCEDURE — G8427 DOCREV CUR MEDS BY ELIG CLIN: HCPCS | Performed by: INTERNAL MEDICINE

## 2021-12-01 PROCEDURE — G8536 NO DOC ELDER MAL SCRN: HCPCS | Performed by: INTERNAL MEDICINE

## 2021-12-01 RX ORDER — TRAMADOL HYDROCHLORIDE 50 MG/1
50 TABLET ORAL 2 TIMES DAILY
Qty: 90 TABLET | Refills: 2 | Status: SHIPPED | OUTPATIENT
Start: 2021-12-01 | End: 2021-12-31

## 2022-01-06 DIAGNOSIS — M15.9 OSTEOARTHRITIS OF MULTIPLE JOINTS, UNSPECIFIED OSTEOARTHRITIS TYPE: Primary | ICD-10-CM

## 2022-01-06 NOTE — TELEPHONE ENCOUNTER
[facility] pharmacist stated need Rx to say 3x daily. Rx sent on 12/01/21 says 2x daily but pt stated he uses it 3x daily.

## 2022-01-07 RX ORDER — TRAMADOL HYDROCHLORIDE 50 MG/1
50 TABLET ORAL
Qty: 90 TABLET | Refills: 0 | Status: SHIPPED | OUTPATIENT
Start: 2022-01-07 | End: 2022-02-06

## 2022-02-23 DIAGNOSIS — G89.4 CHRONIC PAIN SYNDROME: ICD-10-CM

## 2022-02-23 RX ORDER — LEVOCETIRIZINE DIHYDROCHLORIDE 5 MG/1
TABLET, FILM COATED ORAL
Qty: 90 TABLET | Refills: 0 | Status: SHIPPED | OUTPATIENT
Start: 2022-02-23 | End: 2022-05-25

## 2022-03-01 NOTE — PROGRESS NOTES
HISTORY OF PRESENT ILLNESS  Anni Meyer is a 80 y.o. male. HPI     Last here 12/01/21. Pt is here to f/u on chronic conditions.     He states that he sometimes gets sob that lasts for an hour or so and then resolves       Has a history of hypertension  BP today is 154/76 repeat improved  BP at home 115/63  No longer on lisinopril 5mg daily to protect his kidneys d/t high k   He is now taking coreg 6.25 mg BID for cardiomyopathy     Wt today is 184 lbs, down 4 lbs x lov      Reviewed labs   Will get labs    He gave urine sample when he gave blood recently this was a urine drug screen will try to get report      Previously, I referred the pt to Dr. Adalberto Gonzalez (rheum) for history of osteoarthritis he has not decided to go     Pt follows with Dr. Payam Thompson (cardio) for decreased EF cardiomyopathy he is now on Coreg  He gets winded with exertion at times  Last visit 11/09/21  Will f/u 5/22    Recall echo 6/21/21:  · LV: Estimated LVEF is 35 - 40%. Normal cavity size and wall thickness.   Recall stress test 7/23/21:  · SPECT: Left ventricular perfusion is normal. Myocardial perfusion imaging supports a low risk stress test.     Pt follows with Dr. Laureano Garcia ([facility]) for prostate cancer  Last PSA 0.3- rising I have discussed this multiple times with patient he is not interested in following up with urology no longer checking his PSAs I have discussed that this means he likely has biochemical recurrence of his prostate cancer he is aware not interested in follow-up  Recall last note 3/30/16:  PSA undetectable,  We are no longer monitoring PSAs patient is not interested  Recall pt's prostate is removed     Should take over-the-counter vitamin D      No longer taking pepcid-- no current issues with heartburn      Continues on xyzol qhs for allergies - he continues to struggle with allergies   Recall pt is allergic to fungus and pet dander     No longer uses lactulose prn - he has been eating collards which has helped sxs     I give him tramadol for his back and hand pain  Now he is getting 60 tablets q1 months- about 2 per day  Will take an extra tab when his hands are bothering him a lot primarily when it is cold out  Pt knows not to give this to anyone else  This helps with his pain  Not sedating or overly constipating  Pt also has percocet to use prn in the past he no longer has been using this   Pain contract signed on 09/01/21  Urine drug screen complete 8/13/21--he states he dropped off a urine drug screen last week February 2022 will get report     Has mild CKD  Has CKD I monitor his renal function creatinine runs around 1.5-1.7 baseline    Pt lives by himself     Pt is functionally independent     Has grab bars in shower      No memory concerns   Knows the month, date, year, location   Can recall 3/3 objects      ACP not on file. SDM is his son Nik Beatty).           Recall he has a dx of prostate cancer since January 2013 , sees calin annually and last saw him in January 2015--states he was released from care at that time.    Recall the patient has h/o ckd, last cr 1.5 continues to be stable. prior was 1.77, uns 1.5-1.77  Recall that he Used to follow with rheumatologist dr Bakari Acosta years ago --currently not seeing anyone. Recall the patient has hansens and a h/o stomach ulcer, sees ry for this.  Sx controlled with prilosec bid in the past no longer takes anything, not interested in going back      PREVENTIVE:    Colonoscopy: 10/22/13, Dr. Peters , repeat 10 years  EGD: 4/16/16, Dr Peters , repeat 3 years, cancelled has not had any problems  PSA: 1/13 dx'd with prostate cancer, 3/16, 12/17 0.1, 6/18 0.1, 12/18 0.2, followed by Dr. Neftali Morrell, 12/19 0.3  AAA screen: 10/10, negative  Tdap: declines further   Pneumovax: 12/20/2019  Prevnar 13: 6/14/2017  Shingrix: both doses complete  Flu shot: 09/01/21  Eye exam: Dr. Rosangela Calzada 3/22  Lipids: 8/21   A1c: 6/16 5.9, 12/16 5.7  Pain contract: 09/01/21  Urine drug screen: c/w tramadol, 8/13/21  Covid: both + booster (moderna)    Patient Active Problem List    Diagnosis Date Noted    Opioid use, unspecified with unspecified opioid-induced disorder 46/20/7085    Acute systolic congestive heart failure ([facility] [address] 08/05/2021    Dilated aortic root ([facility] 08/05/2021    Mixed hyperlipidemia 08/05/2021    Primary osteoarthritis of both hands 06/13/2018    Prostate cancer ([facility] 02/18/2013    Chronic kidney disease, stage III (moderate) ([facility] [address] 06/28/2011    Vitamin D deficiency 06/27/2011    DJD (degenerative joint disease), multiple sites 03/09/2010    GERD (gastroesophageal reflux disease) 03/09/2010    History of peptic ulcer disease 03/09/2010    Benign prostatic hyperplasia 03/09/2010    Perennial allergic rhinitis 03/09/2010     Current Outpatient Medications   Medication Sig Dispense Refill    traMADoL (ULTRAM) 50 mg tablet Take 100 mg by mouth daily.       levocetirizine (XYZAL) 5 mg tablet TAKE ONE TABLET BY MOUTH EVERY NIGHT AT BEDTIME 90 Tablet 0    carvediloL (COREG) 6.25 mg tablet TAKE ONE TABLET BY MOUTH TWICE A  Tablet 3    lactulose (CHRONULAC) 10 gram/15 mL solution TAKE 5ML BY MOUTH THREE TIMES A DAY (Patient taking differently: daily as needed.) 473 mL 1     Past Surgical History:   Procedure Laterality Date    ENDOSCOPY, COLON, DIAGNOSTIC  2006    by Lucy IRELAND; normal    HX CATARACT REMOVAL  12/28/2014    both eyes    HX COLONOSCOPY  10/22/2013    HX ENDOSCOPY      Followed by Dr. Chloé Meneses (multiple)    HX HEENT  1970's    septoplasty    HX ORTHOPAEDIC  2000    right rotator cuff repair    HX ORTHOPAEDIC      right knee total replacement    HX PROSTATECTOMY  2010    green light laser by Dr. Carey Rider HX UROLOGICAL  5/7/13    CYSTOSCOPY WITH OPTICAL INTERNAL URETHROTOMY, AND CRYOABLATION OF PROSTATE      Lab Results   Component Value Date/Time    WBC 5.3 08/13/2021 09:03 AM    HGB 14.7 08/13/2021 09:03 AM    HCT 43.7 08/13/2021 09:03 AM    PLATELET 292 81/02/8220 09:03 AM    MCV 98 (H) 08/13/2021 09:03 AM     Lab Results   Component Value Date/Time    Cholesterol, total 194 08/13/2021 09:03 AM    HDL Cholesterol 38 (L) 08/13/2021 09:03 AM    LDL, calculated 131 (H) 08/13/2021 09:03 AM    LDL, calculated 131 (H) 08/27/2020 10:36 AM    Triglyceride 138 08/13/2021 09:03 AM    CHOL/HDL Ratio 3.6 06/25/2010 08:36 AM     Lab Results   Component Value Date/Time    GFR est non-AA 37 (L) 08/13/2021 09:03 AM    GFR est AA 43 (L) 08/13/2021 09:03 AM    Creatinine 1.66 (H) 08/13/2021 09:03 AM    BUN 29 (H) 08/13/2021 09:03 AM    Sodium 141 08/13/2021 09:03 AM    Potassium 4.9 08/13/2021 09:03 AM    Chloride 106 08/13/2021 09:03 AM    CO2 22 08/13/2021 09:03 AM        Review of Systems   Respiratory: Negative for shortness of breath. Cardiovascular: Negative for chest pain. Physical Exam  Constitutional:       General: He is not in acute distress. Appearance: Normal appearance. He is not ill-appearing, toxic-appearing or diaphoretic. HENT:      Head: Normocephalic and atraumatic. Right Ear: External ear normal.      Left Ear: External ear normal.   Eyes:      General:         Right eye: No discharge. Left eye: No discharge. Conjunctiva/sclera: Conjunctivae normal.      Pupils: Pupils are equal, round, and reactive to light. Neck:      Vascular: No carotid bruit. Cardiovascular:      Rate and Rhythm: Normal rate and regular rhythm. Heart sounds: No murmur heard. No friction rub. No gallop. Pulmonary:      Effort: No respiratory distress. Breath sounds: Normal breath sounds. No wheezing or rales. Chest:      Chest wall: No tenderness. Musculoskeletal:         General: Normal range of motion. Cervical back: Normal range of motion and neck supple. Right lower leg: No edema. Left lower leg: No edema. Skin:     General: Skin is warm and dry.    Neurological:      Mental Status: He is alert and oriented to person, place, and time. Mental status is at baseline. Coordination: Coordination normal.      Gait: Gait normal.   Psychiatric:         Mood and Affect: Mood normal.         Behavior: Behavior normal.         ASSESSMENT and PLAN    ICD-10-CM ICD-9-CM    1. Stage 3a chronic kidney disease (HCC)         Recent creatinine stable at 1.6 continue to monitor only go to nephrology if there is a decline     N18.31 585.3    2. Medicare annual wellness visit, subsequent  Z00.00 V70.0    3. Acute systolic congestive heart failure (HCC)  I50.21 428.21    Decreased EF on echocardiogram now following with cardiology on Coreg  428.0    4. Opioid use, unspecified with unspecified opioid-induced disorder (HCC)  F11.99 292.9        Has chronic pain from arthritis gets tramadol from me    Recall previously he used to take 5 or 6 of these per day now he only takes 2/day    This works well overall    Pain contract has been updated    He reports dropping off a urine drug screen last week will get report for review    We will get  for review    No signs of abuse    We will continue to prescribe      305.50    5. Dilated aortic root ([facility]       Follow with cardiology for this     I77.810 447.71    6. Prostate cancer [facility])       History of prostatectomy he is not interested in any further follow-up evaluation with this     C61 185    7. Mixed hyperlipidemia       Currently diet controlled     E78.2 272.2         Depression screen reviewed and negative     Scribed by Mauro Duque, as dictated by Dr. Power Dozier. Current diagnosis and concerns discussed with pt at length. Pt understands risks and benefits or current treatment plan and medications, and accepts the treatment and medication with any possible risks. Pt asks appropriate questions, which were answered. Pt was instructed to call with any concerns or problems. I have reviewed the note documented by the scribe.   The services provided are my own.   The documentation is accurate

## 2022-03-02 ENCOUNTER — OFFICE VISIT (OUTPATIENT)
Dept: INTERNAL MEDICINE CLINIC | Age: 87
End: 2022-03-02

## 2022-03-02 VITALS
WEIGHT: 184 LBS | OXYGEN SATURATION: 97 % | HEART RATE: 69 BPM | DIASTOLIC BLOOD PRESSURE: 78 MMHG | BODY MASS INDEX: 26.34 KG/M2 | SYSTOLIC BLOOD PRESSURE: 132 MMHG | TEMPERATURE: 98.4 F | RESPIRATION RATE: 18 BRPM | HEIGHT: 70 IN

## 2022-03-02 DIAGNOSIS — C61 PROSTATE CANCER (HCC): ICD-10-CM

## 2022-03-02 DIAGNOSIS — I50.21 ACUTE SYSTOLIC CONGESTIVE HEART FAILURE (HCC): ICD-10-CM

## 2022-03-02 DIAGNOSIS — F11.99 OPIOID USE, UNSPECIFIED WITH UNSPECIFIED OPIOID-INDUCED DISORDER (HCC): ICD-10-CM

## 2022-03-02 DIAGNOSIS — I77.810 DILATED AORTIC ROOT (HCC): ICD-10-CM

## 2022-03-02 DIAGNOSIS — K59.00 CONSTIPATION, UNSPECIFIED CONSTIPATION TYPE: ICD-10-CM

## 2022-03-02 DIAGNOSIS — E78.2 MIXED HYPERLIPIDEMIA: ICD-10-CM

## 2022-03-02 DIAGNOSIS — N18.31 STAGE 3A CHRONIC KIDNEY DISEASE (HCC): Primary | ICD-10-CM

## 2022-03-02 DIAGNOSIS — Z00.00 MEDICARE ANNUAL WELLNESS VISIT, SUBSEQUENT: ICD-10-CM

## 2022-03-02 PROCEDURE — G0439 PPPS, SUBSEQ VISIT: HCPCS | Performed by: INTERNAL MEDICINE

## 2022-03-02 PROCEDURE — G8510 SCR DEP NEG, NO PLAN REQD: HCPCS | Performed by: INTERNAL MEDICINE

## 2022-03-02 PROCEDURE — G8427 DOCREV CUR MEDS BY ELIG CLIN: HCPCS | Performed by: INTERNAL MEDICINE

## 2022-03-02 PROCEDURE — G8536 NO DOC ELDER MAL SCRN: HCPCS | Performed by: INTERNAL MEDICINE

## 2022-03-02 PROCEDURE — G8419 CALC BMI OUT NRM PARAM NOF/U: HCPCS | Performed by: INTERNAL MEDICINE

## 2022-03-02 PROCEDURE — 1101F PT FALLS ASSESS-DOCD LE1/YR: CPT | Performed by: INTERNAL MEDICINE

## 2022-03-02 PROCEDURE — 99213 OFFICE O/P EST LOW 20 MIN: CPT | Performed by: INTERNAL MEDICINE

## 2022-03-02 RX ORDER — TRAMADOL HYDROCHLORIDE 50 MG/1
100 TABLET ORAL DAILY
COMMUNITY
Start: 2022-02-24 | End: 2022-05-31

## 2022-03-02 NOTE — PROGRESS NOTES
Identified pt with two pt identifiers(name and ). Reviewed record in preparation for visit and have obtained necessary documentation. Chief Complaint   Patient presents with   [place] Annual Wellness Visit        Visit Vitals  BP (!) 154/76 (BP 1 Location: Left upper arm, BP Patient Position: Sitting, BP Cuff Size: Adult)   Pulse 69   Temp 98.4 °F (36.9 °C) (Temporal)   Resp 18   Ht 5' 10\" (1.778 m)   Wt 184 lb (83.5 kg)   SpO2 97%   BMI 26.40 kg/m²       Health Maintenance Due   Topic    DTaP/Tdap/Td series (1 - Tdap)    Medicare Yearly Exam         1. \"Have you been to the ER, urgent care clinic since your last visit? Hospitalized since your last visit? \" No    2. \"Have you seen or consulted any other health care providers outside of the [address] [place] [address] since your last visit? \" No     3. For patients aged 39-70: Has the patient had a colonoscopy / FIT/ Cologuard? Yes - no Care Gap present      If the patient is female:    4. For patients aged 41-77: Has the patient had a mammogram within the past 2 years? NA - based on age or sex      11. For patients aged 21-65: Has the patient had a pap smear? NA - based on age or sex     Med Reconciliation: Completed        Patient is accompanied by self I have received verbal consent from Alexis Espinal to discuss any/all medical information while they are present in the room.

## 2022-03-02 NOTE — PATIENT INSTRUCTIONS
Medicare Wellness Visit, Male    The best way to live healthy is to have a lifestyle where you eat a well-balanced diet, exercise regularly, limit alcohol use, and quit all forms of tobacco/nicotine, if applicable. Regular preventive services are another way to keep healthy. Preventive services (vaccines, screening tests, monitoring & exams) can help personalize your care plan, which helps you manage your own care. Screening tests can find health problems at the earliest stages, when they are easiest to treat. [facility] follows the current, evidence-based guidelines published by the [facility] ([facility]) when recommending preventive services for our patients. Because we follow these guidelines, sometimes recommendations change over time as research supports it. (For example, a prostate screening blood test is no longer routinely recommended for men with no symptoms). Of course, you and your doctor may decide to screen more often for some diseases, based on your risk and co-morbidities (chronic disease you are already diagnosed with). Preventive services for you include:  - Medicare offers their members a free annual wellness visit, which is time for you and your primary care provider to discuss and plan for your preventive service needs. Take advantage of this benefit every year!  -All adults over age 72 should receive the recommended pneumonia vaccines. Current USPSTF guidelines recommend a series of two vaccines for the best pneumonia protection.   -All adults should have a flu vaccine yearly and tetanus vaccine every 10 years.  -All adults age 48 and older should receive the shingles vaccines (series of two vaccines).        -All adults age 38-68 who are overweight should have a diabetes screening test once every three years.   -Other screening tests & preventive services for persons with diabetes include: an eye exam to screen for diabetic retinopathy, a kidney function test, a foot exam, and stricter control over your cholesterol.   -Cardiovascular screening for adults with routine risk involves an electrocardiogram (ECG) at intervals determined by the provider.   -Colorectal cancer screening should be done for adults age 54-65 with no increased risk factors for colorectal cancer. There are a number of acceptable methods of screening for this type of cancer. Each test has its own benefits and drawbacks. Discuss with your provider what is most appropriate for you during your annual wellness visit. The different tests include: colonoscopy (considered the best screening method), a fecal occult blood test, a fecal DNA test, and sigmoidoscopy.  -All adults born between [facility] should be screened once for Hepatitis C.  -An Abdominal Aortic Aneurysm (AAA) Screening is recommended for men age 73-68 who has ever smoked in their lifetime.      Here is a list of your current Health Maintenance items (your personalized list of preventive services) with a due date:  Health Maintenance Due   Topic Date Due    DTaP/Tdap/Td  (1 - Tdap) Never done    COVID-19 Vaccine (3 - Booster for Dalal Peter series) 09/24/2021    Shingles Vaccine (2 of 2) 10/23/2021    Annual Well Visit  02/24/2022

## 2022-03-02 NOTE — PROGRESS NOTES
This is the Subsequent Medicare Annual Wellness Exam, performed 12 months or more after the Initial AWV or the last Subsequent AWV    I have reviewed the patient's medical history in detail and updated the computerized patient record. Assessment/Plan   Education and counseling provided:  Are appropriate based on today's review and evaluation    1. Medicare annual wellness visit, subsequent       Depression Risk Factor Screening     3 most recent PHQ Screens 3/2/2022   Little interest or pleasure in doing things Not at all   Feeling down, depressed, irritable, or hopeless Not at all   Total Score PHQ 2 0       Alcohol & Drug Abuse Risk Screen    Do you average more than 1 drink per night or more than 7 drinks a week: No    In the past three months have you have had more than 4 drinks containing alcohol on one occasion: No          Functional Ability and Level of Safety    Hearing: Hearing is good. Activities of Daily Living: The home contains: no safety equipment. Patient does total self care      Ambulation: with no difficulty     Fall Risk:  Fall Risk Assessment, last 12 mths 3/2/2022   Able to walk? Yes   Fall in past 12 months? 0   Do you feel unsteady?  0   Are you worried about falling 0      Abuse Screen:  Patient is not abused   lives alone    Cognitive Screening    Has your family/caregiver stated any concerns about your memory: no     Cognitive Screening: Normal - Mini Cog Test     No memory concerns   Pt knows the month, day and year   Can recall 3/3 objects     Health Maintenance Due     Health Maintenance Due   Topic Date Due    DTaP/Tdap/Td series (1 - Tdap) Never done    COVID-19 Vaccine (3 - Booster for Dalal Peter series) 09/24/2021    Shingrix Vaccine Age 50> (2 of 2) 10/23/2021    Medicare Yearly Exam  02/24/2022       Patient Care Team   Patient Care Team:  Ayush Santos MD as PCP - General (Internal Medicine)  Ayush Santos MD as PCP - [facility] Empaneled Provider  Mynor Chun (Dental General Practice)  Ye Hernandez MD (Urology)  Aden Louis MD (Inactive) (Orthopedic Surgery)  Vern Zapata (Dermatology)  Swathi Vela MD (Gastroenterology)  Aquiles Lynn MD (Optometry)  Romeo Valdez MD as Physician (Cardiology)    History     Patient Active Problem List   Diagnosis Code    DJD (degenerative joint disease), multiple sites M15.9    GERD (gastroesophageal reflux disease) K21.9    History of peptic ulcer disease Z87.11    Benign prostatic hyperplasia N40.0    Perennial allergic rhinitis J30.89    Vitamin D deficiency E55.9    Chronic kidney disease, stage III (moderate) ([address] N18.30    Prostate cancer ([address] C61    Primary osteoarthritis of both hands M19.041, N10.276    Acute systolic congestive heart failure ([address] I50.21    Dilated aortic root ([address] I77.810    Mixed hyperlipidemia E78.2    Opioid use, unspecified with unspecified opioid-induced disorder F11.99     Past Medical History:   Diagnosis Date    Acute systolic congestive heart failure ([address] 8/5/2021    Arthritis     severe diffuse DJD    Calculus of kidney 1988    2 bouts    Cancer [facility])     prostate    Chronic kidney disease 2010 urinary obstruction diagnosed    due to obstructive uropathy and ? NSAID use long term    GERD (gastroesophageal reflux disease) 6/2009    with mild Chin's    Hematuria, microscopic 1970's ?    negative cysto and imaging    Mixed hyperlipidemia 8/5/2021    Murmur     Other ill-defined conditions(799.89) 2006    blood transfusion hx    Perennial allergic rhinitis     mold --per Dr. Tyesha Berry    PUD (peptic ulcer disease) 2006    stomach    Syncope 8/21/2011      Past Surgical History:   Procedure Laterality Date    ENDOSCOPY, COLON, DIAGNOSTIC  2006    by Jagdeep Ha MD; normal    HX CATARACT REMOVAL  12/28/2014    both eyes    HX COLONOSCOPY  10/22/2013    HX ENDOSCOPY      Followed by Dr. Kyle Elam (multiple)    HX HEENT  2217'R septoplasty    HX ORTHOPAEDIC  2000    right rotator cuff repair    HX ORTHOPAEDIC      right knee total replacement    HX PROSTATECTOMY      green light laser by Dr. Virginia Hernandez HX UROLOGICAL  13    CYSTOSCOPY WITH OPTICAL INTERNAL URETHROTOMY, AND CRYOABLATION OF PROSTATE     Current Outpatient Medications   Medication Sig Dispense Refill    levocetirizine (XYZAL) 5 mg tablet TAKE ONE TABLET BY MOUTH EVERY NIGHT AT BEDTIME 90 Tablet 0    carvediloL (COREG) 6.25 mg tablet TAKE ONE TABLET BY MOUTH TWICE A  Tablet 3    lactulose (CHRONULAC) 10 gram/15 mL solution TAKE 5ML BY MOUTH THREE TIMES A DAY (Patient taking differently: daily as needed.) 473 mL 1     No Known Allergies    Family History   Problem Relation Age of Onset    Heart Disease Mother     Stroke Mother     Hypertension Mother     OSTEOARTHRITIS Mother         neck and spine; back surgeries x5    Dementia Father         Alzheimer's type    Pneumonia Father         cause of death    OSTEOARTHRITIS Sister     Other Son         Biospy (unsure where)    No Known Problems Daughter      Social History     Tobacco Use    Smoking status: Former Smoker     Packs/day: 2.00     Years: 10.00     Pack years: 20.00     Types: Cigarettes     Quit date: 1961     Years since quittin.1    Smokeless tobacco: Never Used    Tobacco comment: When quit - over 3ppd   Substance Use Topics    Alcohol use: No     Alcohol/week: 0.0 standard drinks     Comment: quit completely in       ACP not on file.  SDM is his son Bernardino Mendez).       Colonoscopy: 10/22/13, Dr. Trent Ng, repeat 10 years    PSA:  dx'd with prostate cancer,   0.3    AAA screen: 10/10, negative    Tdap: declines further   Pneumovax: 2019  Prevnar 13: 2017  Shingrix: got first dose and second dose  Flu shot: 21    Eye exam: Dr. Sandra Roberson 3/22    Lipids:   annual   A1c:   5.7 fasting blood sugar annually last August 2021    Covid: both Normajean Castleman), and  booster      Medication reconciliation completed by MA and reviewed by me. Medical/surgical/social/family history reviewed and updated by me. Patient provided AVS and preventative screening table. Patient verbalized understanding of all information discussed.     Klaus Garcia MD

## 2022-03-03 LAB
ALBUMIN SERPL-MCNC: 4.6 G/DL (ref 3.6–4.6)
ALBUMIN/GLOB SERPL: 1.8 {RATIO} (ref 1.2–2.2)
ALP SERPL-CCNC: 99 IU/L (ref 44–121)
ALT SERPL-CCNC: 22 IU/L (ref 0–44)
AST SERPL-CCNC: 27 IU/L (ref 0–40)
BILIRUB SERPL-MCNC: 0.5 MG/DL (ref 0–1.2)
BUN SERPL-MCNC: 32 MG/DL (ref 8–27)
BUN/CREAT SERPL: 19 (ref 10–24)
CALCIUM SERPL-MCNC: 9.2 MG/DL (ref 8.6–10.2)
CHLORIDE SERPL-SCNC: 109 MMOL/L (ref 96–106)
CO2 SERPL-SCNC: 20 MMOL/L (ref 20–29)
CREAT SERPL-MCNC: 1.67 MG/DL (ref 0.76–1.27)
DRUGS UR: NORMAL
EST. AVERAGE GLUCOSE BLD GHB EST-MCNC: 120 MG/DL
GLOBULIN SER CALC-MCNC: 2.5 G/DL (ref 1.5–4.5)
GLUCOSE SERPL-MCNC: 102 MG/DL (ref 65–99)
HBA1C MFR BLD: 5.8 % (ref 4.8–5.6)
POTASSIUM SERPL-SCNC: 5.1 MMOL/L (ref 3.5–5.2)
PROT SERPL-MCNC: 7.1 G/DL (ref 6–8.5)
SODIUM SERPL-SCNC: 145 MMOL/L (ref 134–144)

## 2022-03-13 LAB — DRUGS UR: NORMAL

## 2022-03-19 PROBLEM — M19.042 PRIMARY OSTEOARTHRITIS OF BOTH HANDS: Status: ACTIVE | Noted: 2018-06-13

## 2022-03-19 PROBLEM — M19.041 PRIMARY OSTEOARTHRITIS OF BOTH HANDS: Status: ACTIVE | Noted: 2018-06-13

## 2022-03-19 PROBLEM — F11.99 OPIOID USE, UNSPECIFIED WITH UNSPECIFIED OPIOID-INDUCED DISORDER (HCC): Status: ACTIVE | Noted: 2021-12-01

## 2022-03-19 PROBLEM — E78.2 MIXED HYPERLIPIDEMIA: Status: ACTIVE | Noted: 2021-08-05

## 2022-03-19 PROBLEM — I50.21 ACUTE SYSTOLIC CONGESTIVE HEART FAILURE (HCC): Status: ACTIVE | Noted: 2021-08-05

## 2022-03-20 PROBLEM — I77.810 DILATED AORTIC ROOT (HCC): Status: ACTIVE | Noted: 2021-08-05

## 2022-05-25 DIAGNOSIS — G89.4 CHRONIC PAIN SYNDROME: ICD-10-CM

## 2022-05-25 RX ORDER — LEVOCETIRIZINE DIHYDROCHLORIDE 5 MG/1
TABLET, FILM COATED ORAL
Qty: 90 TABLET | Refills: 0 | Status: SHIPPED | OUTPATIENT
Start: 2022-05-25 | End: 2022-08-22

## 2022-05-31 DIAGNOSIS — M15.9 OSTEOARTHRITIS OF MULTIPLE JOINTS, UNSPECIFIED OSTEOARTHRITIS TYPE: Primary | ICD-10-CM

## 2022-05-31 RX ORDER — TRAMADOL HYDROCHLORIDE 50 MG/1
TABLET ORAL
Qty: 90 TABLET | Refills: 0 | Status: SHIPPED | OUTPATIENT
Start: 2022-05-31 | End: 2022-06-30

## 2022-06-07 ENCOUNTER — OFFICE VISIT (OUTPATIENT)
Dept: INTERNAL MEDICINE CLINIC | Age: 87
End: 2022-06-07
Payer: MEDICARE

## 2022-06-07 VITALS
HEART RATE: 72 BPM | BODY MASS INDEX: 25.2 KG/M2 | WEIGHT: 176 LBS | HEIGHT: 70 IN | TEMPERATURE: 97.6 F | OXYGEN SATURATION: 97 % | DIASTOLIC BLOOD PRESSURE: 72 MMHG | SYSTOLIC BLOOD PRESSURE: 120 MMHG | RESPIRATION RATE: 16 BRPM

## 2022-06-07 DIAGNOSIS — M15.9 OSTEOARTHRITIS OF MULTIPLE JOINTS, UNSPECIFIED OSTEOARTHRITIS TYPE: ICD-10-CM

## 2022-06-07 DIAGNOSIS — I50.21 ACUTE SYSTOLIC CONGESTIVE HEART FAILURE (HCC): Primary | ICD-10-CM

## 2022-06-07 DIAGNOSIS — I77.810 DILATED AORTIC ROOT (HCC): ICD-10-CM

## 2022-06-07 DIAGNOSIS — N18.31 STAGE 3A CHRONIC KIDNEY DISEASE (HCC): ICD-10-CM

## 2022-06-07 DIAGNOSIS — K21.9 GASTROESOPHAGEAL REFLUX DISEASE WITHOUT ESOPHAGITIS: ICD-10-CM

## 2022-06-07 DIAGNOSIS — E78.2 MIXED HYPERLIPIDEMIA: ICD-10-CM

## 2022-06-07 DIAGNOSIS — R73.01 IFG (IMPAIRED FASTING GLUCOSE): ICD-10-CM

## 2022-06-07 DIAGNOSIS — F11.99 OPIOID USE, UNSPECIFIED WITH UNSPECIFIED OPIOID-INDUCED DISORDER (HCC): ICD-10-CM

## 2022-06-07 DIAGNOSIS — C61 PROSTATE CANCER (HCC): ICD-10-CM

## 2022-06-07 PROCEDURE — G8417 CALC BMI ABV UP PARAM F/U: HCPCS | Performed by: INTERNAL MEDICINE

## 2022-06-07 PROCEDURE — G8427 DOCREV CUR MEDS BY ELIG CLIN: HCPCS | Performed by: INTERNAL MEDICINE

## 2022-06-07 PROCEDURE — G8536 NO DOC ELDER MAL SCRN: HCPCS | Performed by: INTERNAL MEDICINE

## 2022-06-07 PROCEDURE — 1101F PT FALLS ASSESS-DOCD LE1/YR: CPT | Performed by: INTERNAL MEDICINE

## 2022-06-07 PROCEDURE — G8510 SCR DEP NEG, NO PLAN REQD: HCPCS | Performed by: INTERNAL MEDICINE

## 2022-06-07 PROCEDURE — 99213 OFFICE O/P EST LOW 20 MIN: CPT | Performed by: INTERNAL MEDICINE

## 2022-06-07 NOTE — PROGRESS NOTES
1. \"Have you been to the ER, urgent care clinic since your last visit? Hospitalized since your last visit? \" No    2. \"Have you seen or consulted any other health care providers outside of the [address] [place] [address] since your last visit? \" No     3. For patients aged 39-70: Has the patient had a colonoscopy / FIT/ Cologuard? NA - based on age      If the patient is female:    4. For patients aged 41-77: Has the patient had a mammogram within the past 2 years? NA - based on age or sex      11. For patients aged 21-65: Has the patient had a pap smear?  NA - based on age or sex

## 2022-07-12 DIAGNOSIS — M15.9 OSTEOARTHRITIS OF MULTIPLE JOINTS, UNSPECIFIED OSTEOARTHRITIS TYPE: Primary | ICD-10-CM

## 2022-07-12 RX ORDER — TRAMADOL HYDROCHLORIDE 50 MG/1
TABLET ORAL
Qty: 90 TABLET | Refills: 0 | Status: SHIPPED | OUTPATIENT
Start: 2022-07-12 | End: 2022-08-11

## 2022-08-22 DIAGNOSIS — G89.4 CHRONIC PAIN SYNDROME: ICD-10-CM

## 2022-08-22 RX ORDER — LEVOCETIRIZINE DIHYDROCHLORIDE 5 MG/1
TABLET, FILM COATED ORAL
Qty: 90 TABLET | Refills: 0 | Status: SHIPPED | OUTPATIENT
Start: 2022-08-22

## 2022-08-25 DIAGNOSIS — M15.9 OSTEOARTHRITIS OF MULTIPLE JOINTS, UNSPECIFIED OSTEOARTHRITIS TYPE: Primary | ICD-10-CM

## 2022-08-26 RX ORDER — TRAMADOL HYDROCHLORIDE 50 MG/1
TABLET ORAL
Qty: 90 TABLET | Refills: 0 | Status: SHIPPED | OUTPATIENT
Start: 2022-08-26 | End: 2022-09-25

## 2022-09-10 LAB
ALBUMIN SERPL-MCNC: 4.4 G/DL (ref 3.6–4.6)
ALBUMIN/GLOB SERPL: 1.9 {RATIO} (ref 1.2–2.2)
ALP SERPL-CCNC: 89 IU/L (ref 44–121)
ALT SERPL-CCNC: 13 IU/L (ref 0–44)
AST SERPL-CCNC: 18 IU/L (ref 0–40)
BILIRUB SERPL-MCNC: 0.6 MG/DL (ref 0–1.2)
BUN SERPL-MCNC: 30 MG/DL (ref 8–27)
BUN/CREAT SERPL: 18 (ref 10–24)
CALCIUM SERPL-MCNC: 9.4 MG/DL (ref 8.6–10.2)
CHLORIDE SERPL-SCNC: 104 MMOL/L (ref 96–106)
CHOLEST SERPL-MCNC: 171 MG/DL (ref 100–199)
CO2 SERPL-SCNC: 22 MMOL/L (ref 20–29)
CREAT SERPL-MCNC: 1.71 MG/DL (ref 0.76–1.27)
DRUGS UR: NORMAL
EGFR: 38 ML/MIN/1.73
ERYTHROCYTE [DISTWIDTH] IN BLOOD BY AUTOMATED COUNT: 12.5 % (ref 11.6–15.4)
EST. AVERAGE GLUCOSE BLD GHB EST-MCNC: 111 MG/DL
GLOBULIN SER CALC-MCNC: 2.3 G/DL (ref 1.5–4.5)
GLUCOSE SERPL-MCNC: 105 MG/DL (ref 65–99)
HBA1C MFR BLD: 5.5 % (ref 4.8–5.6)
HCT VFR BLD AUTO: 42.9 % (ref 37.5–51)
HDLC SERPL-MCNC: 40 MG/DL
HGB BLD-MCNC: 14 G/DL (ref 13–17.7)
LDLC SERPL CALC-MCNC: 108 MG/DL (ref 0–99)
MCH RBC QN AUTO: 32.4 PG (ref 26.6–33)
MCHC RBC AUTO-ENTMCNC: 32.6 G/DL (ref 31.5–35.7)
MCV RBC AUTO: 99 FL (ref 79–97)
PLATELET # BLD AUTO: 159 X10E3/UL (ref 150–450)
POTASSIUM SERPL-SCNC: 5.2 MMOL/L (ref 3.5–5.2)
PROT SERPL-MCNC: 6.7 G/DL (ref 6–8.5)
RBC # BLD AUTO: 4.32 X10E6/UL (ref 4.14–5.8)
SODIUM SERPL-SCNC: 143 MMOL/L (ref 134–144)
TRIGL SERPL-MCNC: 125 MG/DL (ref 0–149)
TSH SERPL DL<=0.005 MIU/L-ACNC: 0.76 UIU/ML (ref 0.45–4.5)
VLDLC SERPL CALC-MCNC: 23 MG/DL (ref 5–40)
WBC # BLD AUTO: 6.2 X10E3/UL (ref 3.4–10.8)

## 2022-09-12 NOTE — PROGRESS NOTES
HISTORY OF PRESENT ILLNESS  Poonam Deluna is a 80 y.o. male. HPI     Last here 6/7/22. Pt is here to f/u on chronic conditions. Pt c/o R knee pain and buckling. This has been a progressive problem he saw Ortho in the past and they had discussed knee replacement and he thinks he would like to proceed with this sooner rather than later  He notes he will likely f/U w/ Dr. Hoa Wood (ortho). Encouraged pt to do this. Has a history of hypertension  BP today is 130/72  No home BP readings for review  He is taking coreg 6.25 mg BID for cardiomyopathy  No longer on lisinopril 5mg daily to protect his kidneys d/t high k      Wt today is 173 lbs, down 3 lbs since lov   Discussed recent wt/l- he has been eating 2 meals per day due to stomach upset from coreg he reports he is eating less overall  Pt notes that eating 2 meals has helped, he is not having upset stomach  Discussed an EGD would be a good idea due to weight loss and hx of stomach upset, he is not interested in this but will let me know if he changes his mind     Reviewed labs   Ordered labs      Previously, I referred the pt to Dr. Janell Coyle (rheum) for history of osteoarthritis he has not decided to go     Pt follows with Dr. Leatha Kapoor (cardio) for decreased EF cardiomyopathy he is now on Coreg  He gets winded with exertion at times  Last visit 5/22, will f/u in 6 mo, next f/U in 11/22     Recall echo 6/21/21:  LV: Estimated LVEF is 35 - 40%. Normal cavity size and wall thickness.   Recall stress test 7/23/21:  SPECT: Left ventricular perfusion is normal. Myocardial perfusion imaging supports a low risk stress test.     Pt previously followed with Dr. Jael Park (uro) for prostate cancer, not interested in follow up  Last PSA 0.3- rising   We are no longer monitoring PSAs patient is not interested  I have discussed this multiple times with patient he is not interested in following up with urology no longer checking his PSAs I have discussed that this means he likely has biochemical recurrence of his prostate cancer he is aware not interested in follow-up  Recall last note 3/30/16:  PSA undetectable,  Recall pt's prostate is removed     Should take over-the-counter vitamin D      No longer taking pepcid-- no current issues with heartburn since switching to 2 meals per day      Continues on xyzol qhs for allergies - he continues to struggle with allergies   Recall pt is allergic to fungus and pet dander     Taking lactulose prn, not very often - he has been eating collards which has helped sxs     I give him tramadol for his back and hand pain  Now he is getting 90 tablets q1 months- about 2 per day up to 3 lasts for more than 1 months generally lasts a month and a half  Will take an extra tab when his hands are bothering him a lot primarily when it is cold out  Pt knows not to give this to anyone else  This helps with his pain  Not sedating or overly constipating  Pt also has percocet to use prn in the past he no longer has been using this   Pain contract signed on 09/01/21  Urine drug screen complete 9/22     Has mild CKD  This is been stable for years  Has CKD I monitor his renal function creatinine runs around 1.5-1.7 baseline  Has renal US in 6/2010 did show some hydronephrosis at that time and he saw urology for evaluation     ACP not on file. SDM is his son Mariusz Zavala). Recall he has a dx of prostate cancer since January 2013 , sees calin annually and last saw him in January 2015--states he was released from care at that time. Recall the patient has h/o ckd, last cr 1.5 continues to be stable. prior was 1.77, uns 1.5-1.77  Recall that he Used to follow with rheumatologist dr Das Members years ago --currently not seeing anyone. Recall the patient has hansens and a h/o stomach ulcer, sees ry for this.  Sx controlled with prilosec bid in the past no longer takes anything, not interested in going back discussed this in terms of his weight loss again he declines further evaluation      PREVENTIVE:    Colonoscopy: 10/22/13, Dr. Erasto Maddox, repeat 10 years  EGD: 4/16/16, Dr Erasto Maddox, repeat 3 years, cancelled has not had any problems  PSA: 1/13 dx'd with prostate cancer,  12/18 0.2, followed by Dr. Macho Brandt, 12/19 0.3  AAA screen: 10/10, negative  Tdap: declines further   Pneumovax: 12/20/2019  Prevnar 13: 6/14/2017  Shingrix: both doses complete  Flu shot: 09/01/21  Eye exam: Dr. Penny Castro 3/22  Lipids: 9/22   A1c: 6/16 5.9, 12/16 5.7 2/22 5.8 9/22 5.5  Pain contract: 09/22  Urine drug screen: c/w tramadol, 9/22  Covid: 3 doses (Blaire Bauer), discussed booster         Patient Active Problem List    Diagnosis Date Noted    Opioid use, unspecified with unspecified opioid-induced disorder 59/59/7628    Acute systolic congestive heart failure ([address] 08/05/2021    Dilated aortic root ([facility] 08/05/2021    Mixed hyperlipidemia 08/05/2021    Primary osteoarthritis of both hands 06/13/2018    Prostate cancer ([facility] 02/18/2013    Chronic kidney disease, stage III (moderate) ([facility] 06/28/2011    Vitamin D deficiency 06/27/2011    DJD (degenerative joint disease), multiple sites 03/09/2010    GERD (gastroesophageal reflux disease) 03/09/2010    History of peptic ulcer disease 03/09/2010    Benign prostatic hyperplasia 03/09/2010    Perennial allergic rhinitis 03/09/2010     Current Outpatient Medications   Medication Sig Dispense Refill    traMADoL (ULTRAM) 50 mg tablet TAKE ONE TABLET BY MOUTH DAILY THREE TIMES A DAY AS NEEDED FOR PAIN FOR UP TO 30 DAYS **MAX DAILY AMOUNT IS 3 TABLETS** 90 Tablet 0    levocetirizine (XYZAL) 5 mg tablet TAKE ONE TABLET BY MOUTH EVERY NIGHT AT BEDTIME 90 Tablet 0    carvediloL (COREG) 6.25 mg tablet TAKE ONE TABLET BY MOUTH TWICE A  Tablet 3    lactulose (CHRONULAC) 10 gram/15 mL solution TAKE 5ML BY MOUTH THREE TIMES A DAY (Patient taking differently: daily as needed.) 473 mL 1     Past Surgical History:   Procedure Laterality Date    ENDOSCOPY, COLON, DIAGNOSTIC  2006    by [address] [place] [address] Avenue MD; normal    HX CATARACT REMOVAL  12/28/2014    both eyes    HX COLONOSCOPY  10/22/2013    HX ENDOSCOPY      Followed by Dr. Brenden Mcmahon (multiple)    HX HEENT  1970's    septoplasty    HX ORTHOPAEDIC  2000    right rotator cuff repair    HX ORTHOPAEDIC      right knee total replacement    HX PROSTATECTOMY  2010    green light laser by Dr. Kenya Cheney UROLOGICAL  5/7/13    CYSTOSCOPY WITH OPTICAL INTERNAL URETHROTOMY, AND CRYOABLATION OF PROSTATE      Lab Results   Component Value Date/Time    WBC 6.2 09/07/2022 10:07 AM    HGB 14.0 09/07/2022 10:07 AM    HCT 42.9 09/07/2022 10:07 AM    PLATELET 270 20/42/0309 10:07 AM    MCV 99 (H) 09/07/2022 10:07 AM     Lab Results   Component Value Date/Time    Cholesterol, total 171 09/07/2022 10:07 AM    HDL Cholesterol 40 09/07/2022 10:07 AM    LDL, calculated 108 (H) 09/07/2022 10:07 AM    LDL, calculated 131 (H) 08/27/2020 10:36 AM    Triglyceride 125 09/07/2022 10:07 AM    CHOL/HDL Ratio 3.6 06/25/2010 08:36 AM     Lab Results   Component Value Date/Time    GFR est non-AA 36 (L) 02/24/2022 09:27 AM    GFR est AA 42 (L) 02/24/2022 09:27 AM    Creatinine 1.71 (H) 09/07/2022 10:07 AM    BUN 30 (H) 09/07/2022 10:07 AM    Sodium 143 09/07/2022 10:07 AM    Potassium 5.2 09/07/2022 10:07 AM    Chloride 104 09/07/2022 10:07 AM    CO2 22 09/07/2022 10:07 AM        Review of Systems   Respiratory:  Negative for shortness of breath. Cardiovascular:  Negative for chest pain. Physical Exam  Constitutional:       General: He is not in acute distress. Appearance: Normal appearance. He is not ill-appearing, toxic-appearing or diaphoretic. HENT:      Head: Normocephalic and atraumatic. Right Ear: External ear normal.      Left Ear: External ear normal.   Eyes:      General:         Right eye: No discharge. Left eye: No discharge.       Conjunctiva/sclera: Conjunctivae normal.      Pupils: Pupils are equal, round, and reactive to light. Cardiovascular:      Rate and Rhythm: Normal rate and regular rhythm. Heart sounds: No murmur heard. No friction rub. No gallop. Pulmonary:      Effort: No respiratory distress. Breath sounds: Normal breath sounds. No wheezing or rales. Chest:      Chest wall: No tenderness. Musculoskeletal:         General: Normal range of motion. Cervical back: Normal.      Right lower leg: No edema. Left lower leg: No edema. Skin:     General: Skin is warm and dry. Neurological:      General: No focal deficit present. Mental Status: He is oriented to person, place, and time. Gait: Gait normal.   Psychiatric:         Mood and Affect: Mood normal.         Behavior: Behavior normal.       ASSESSMENT and PLAN    ICD-10-CM ICD-9-CM    1. Prostate cancer [facility])  C61 185    Patient with history of prostate cancer previously saw urology he has had biochemical recurrence despite his past prostatectomy I have discussed with him on multiple occasions that he has had biochemical recurrence he declines further evaluation   2. Dilated aortic root (HCC)  I77.810 447.71    Follows with cardiology routinely has follow-up later this fall   3. Chronic systolic congestive heart failure (HCC)  I50.22 428.22    Follows with cardiology routinely has follow-up later this fall continues on Coreg  428.0       4. Stage 3a chronic kidney disease (HCC)  N18.31 585. 3    Creatinine runs 1.5-1.7 baseline has been stable for many years I continue to monitor   5. IFG (impaired fasting glucose)  R73.01 790.21    Check A1c every 6 months stable on recent labs   6.  Gastroesophageal reflux disease without esophagitis  K21.9 530.81    Controlled with medication has been having some weight loss which I discussed today had difficulty with an upset stomach which he attributes to his Coreg but he feels that he has resolved this problem by adjusting the timing of his meals discussed getting repeat endoscopy he declines 7. Osteoarthritis of multiple joints, unspecified osteoarthritis type  M15.9 715.89    On tramadol that he gets from he gets 90 tablets/month he takes 2/day sometimes 3 this lasts more than 1 month rated pain contract today  will be reviewed urine drug screen reviewed and consistent was tramadol   8. Perennial allergic rhinitis  J30.89 477.8    Stable izyxol   9. Essential hypertension  I10 401.9    Controlled on Coreg   Knee arthritis--follow-up Ortho  Depression screen reviewed and negative. Scribed by Stephania Marte, as dictated by Dr. Shan Johnson. Current diagnosis and concerns discussed with pt at length. Pt understands risks and benefits or current treatment plan and medications, and accepts the treatment and medication with any possible risks. Pt asks appropriate questions, which were answered. Pt was instructed to call with any concerns or problems. I have reviewed the note documented by the scribe. The services provided are my own. The documentation is accurate.

## 2022-09-14 ENCOUNTER — OFFICE VISIT (OUTPATIENT)
Dept: INTERNAL MEDICINE CLINIC | Age: 87
End: 2022-09-14
Payer: MEDICARE

## 2022-09-14 VITALS
HEART RATE: 62 BPM | HEIGHT: 70 IN | BODY MASS INDEX: 24.85 KG/M2 | TEMPERATURE: 98.1 F | DIASTOLIC BLOOD PRESSURE: 72 MMHG | RESPIRATION RATE: 16 BRPM | WEIGHT: 173.6 LBS | SYSTOLIC BLOOD PRESSURE: 130 MMHG | OXYGEN SATURATION: 95 %

## 2022-09-14 DIAGNOSIS — C61 PROSTATE CANCER (HCC): Primary | ICD-10-CM

## 2022-09-14 DIAGNOSIS — N18.31 STAGE 3A CHRONIC KIDNEY DISEASE (HCC): ICD-10-CM

## 2022-09-14 DIAGNOSIS — R73.01 IFG (IMPAIRED FASTING GLUCOSE): ICD-10-CM

## 2022-09-14 DIAGNOSIS — I10 ESSENTIAL HYPERTENSION: ICD-10-CM

## 2022-09-14 DIAGNOSIS — K21.9 GASTROESOPHAGEAL REFLUX DISEASE WITHOUT ESOPHAGITIS: ICD-10-CM

## 2022-09-14 DIAGNOSIS — I77.810 DILATED AORTIC ROOT (HCC): ICD-10-CM

## 2022-09-14 DIAGNOSIS — I50.22 CHRONIC SYSTOLIC CONGESTIVE HEART FAILURE (HCC): ICD-10-CM

## 2022-09-14 DIAGNOSIS — J30.89 PERENNIAL ALLERGIC RHINITIS: ICD-10-CM

## 2022-09-14 DIAGNOSIS — M15.9 OSTEOARTHRITIS OF MULTIPLE JOINTS, UNSPECIFIED OSTEOARTHRITIS TYPE: ICD-10-CM

## 2022-09-14 PROBLEM — I50.21 ACUTE SYSTOLIC CONGESTIVE HEART FAILURE (HCC): Status: RESOLVED | Noted: 2021-08-05 | Resolved: 2022-09-14

## 2022-09-14 PROCEDURE — G8420 CALC BMI NORM PARAMETERS: HCPCS | Performed by: INTERNAL MEDICINE

## 2022-09-14 PROCEDURE — 1101F PT FALLS ASSESS-DOCD LE1/YR: CPT | Performed by: INTERNAL MEDICINE

## 2022-09-14 PROCEDURE — G8536 NO DOC ELDER MAL SCRN: HCPCS | Performed by: INTERNAL MEDICINE

## 2022-09-14 PROCEDURE — G8510 SCR DEP NEG, NO PLAN REQD: HCPCS | Performed by: INTERNAL MEDICINE

## 2022-09-14 PROCEDURE — 99214 OFFICE O/P EST MOD 30 MIN: CPT | Performed by: INTERNAL MEDICINE

## 2022-09-14 PROCEDURE — G8427 DOCREV CUR MEDS BY ELIG CLIN: HCPCS | Performed by: INTERNAL MEDICINE

## 2022-10-11 DIAGNOSIS — M15.9 OSTEOARTHRITIS OF MULTIPLE JOINTS, UNSPECIFIED OSTEOARTHRITIS TYPE: Primary | ICD-10-CM

## 2022-10-11 RX ORDER — TRAMADOL HYDROCHLORIDE 50 MG/1
TABLET ORAL
Qty: 90 TABLET | Refills: 0 | Status: SHIPPED | OUTPATIENT
Start: 2022-10-11 | End: 2022-11-10

## 2022-11-21 DIAGNOSIS — G89.4 CHRONIC PAIN SYNDROME: ICD-10-CM

## 2022-11-21 RX ORDER — LEVOCETIRIZINE DIHYDROCHLORIDE 5 MG/1
TABLET, FILM COATED ORAL
Qty: 90 TABLET | Refills: 0 | Status: SHIPPED | OUTPATIENT
Start: 2022-11-21

## 2022-11-25 DIAGNOSIS — M15.9 OSTEOARTHRITIS OF MULTIPLE JOINTS, UNSPECIFIED OSTEOARTHRITIS TYPE: Primary | ICD-10-CM

## 2022-11-26 RX ORDER — TRAMADOL HYDROCHLORIDE 50 MG/1
TABLET ORAL
Qty: 90 TABLET | Refills: 0 | Status: SHIPPED | OUTPATIENT
Start: 2022-11-26 | End: 2022-12-26

## 2022-12-08 ENCOUNTER — TELEPHONE (OUTPATIENT)
Dept: INTERNAL MEDICINE CLINIC | Age: 87
End: 2022-12-08

## 2022-12-08 NOTE — TELEPHONE ENCOUNTER
----- Message from Vivek Marilou sent at 12/8/2022  9:58 AM EST -----  Subject: Message to Provider    QUESTIONS  Information for Provider? Patient returning [facility] call call him back  ---------------------------------------------------------------------------  --------------  [address]  [phone]; Do not leave any message, patient will call back for answer  ---------------------------------------------------------------------------  --------------  SCRIPT ANSWERS  Relationship to Patient?  Self

## 2022-12-09 NOTE — PROGRESS NOTES
HISTORY OF PRESENT ILLNESS  Anni Meyer is a 80 y.o. male. HPI  Last here 9/14/22. Pt is here to f/u on chronic conditions. Has a history of hypertension  BP today is 135/69  BP at Dr. Isaias Escobar: 138/68  He is taking coreg 6.25 mg BID for cardiomyopathy  No longer on lisinopril 5mg daily to protect his kidneys d/t high k      Wt today is 172 lbs, stable since lov, down 16 lbs over past year  Discussed recent wt/l with him at length discussed evaluating this further to find out what is going on  Pt notes that eating 2 meals has helped, he is not having upset stomach currently that previously he did complain of this  Recall last EGD 2016   Previously discussed an EGD would be a good idea due to weight loss and hx of stomach upset. Discussed getting a CT of the abdomen for further evaluation as well  Discussed again at length my recommendation is to see Dr. Jeff Padilla (GI) to rule out GI cancer, gave his information. He will think about this. Discussed options for evaluation he is not interested in any evaluation he states he will think about it I did give him the information for gastroenterology and discussed that if we do not evaluate this further we do potentially miss diagnosis of cancer he expressed understanding he is not interested in further evaluation at this time     Reviewed labs   Ordered labs    Lov c/o R knee pain and buckling.  This has been a progressive problem he saw Ortho in the past and they had discussed knee replacement and he thinks he would like to proceed with this sooner rather than later  Will likely f/U w/ Dr. Eb Gale (ortho)     Previously, I referred the pt to Dr. Adalberto Gonzalez (rheum) for history of osteoarthritis he has not decided to go     Pt follows with Dr. aPyam Thompson (cardio) for decreased EF cardiomyopathy he is now on Coreg  He gets winded with exertion at times  Last visit 11/10/22   Reviewed note 5/22: they talked about cardiomyopathy, repeat echo in 1 year     Recall echo 6/21/21:  LV: Estimated LVEF is 35 - 40%. Normal cavity size and wall thickness. Recall stress test 7/23/21:  SPECT: Left ventricular perfusion is normal. Myocardial perfusion imaging supports a low risk stress test.     Pt previously followed with Dr. 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 From time to time not interested in more medication or evaluation  Discussed that elevated PSA is not related to sx's, my recommendation of following up with urology is the same. He is clear that he does not want this checked or evaluated at all.       Should take over-the-counter vitamin D      No longer taking pepcid-- no current issues with heartburn since switching to 2 meals per day      Continues on xyzol qhs for allergies - he continues to struggle with allergies   Recall pt is allergic to fungus and pet dander     Taking lactulose prn, not very often - he has been eating collards which has helped sxs     I give him tramadol for his back and hand pain  Now he is getting 90 tablets q1 months- about 2 per day, occasionally 3, generally lasts a month and a half  Will take an extra tab when his hands are bothering him a lot primarily when it is cold out  Pt knows not to give this to anyone else  This helps with his pain  Not sedating or overly constipating  Pt also has percocet to use prn in the past he no longer has been using this   Pain contract signed on 09/22  Urine drug screen complete 9/22     Has mild CKD  This is been stable for years  Has CKD I monitor his renal function creatinine runs around 1.5-1.7 baseline  Has renal US in 6/2010 did show some hydronephrosis at that time and he saw urology for evaluation     ACP not on file. SDM is his son Ce Alvarado). Recall he has a dx of prostate cancer since January 2013 , previously saw layton annually and last saw him in January 2015--states he was released from care at that time. Recall the patient has h/o ckd, last cr 1.5 continues to be stable. prior was 1.77, uns 1.5-1.77  Recall that he Used to follow with rheumatologist dr Pavithra Paez years ago --currently not seeing anyone. Recall the patient has hansens and a h/o stomach ulcer, sees ry for this.  Sx controlled with prilosec bid in the past no longer takes anything, not interested in going back discussed this in terms of his weight loss again he declines further evaluation see above      PREVENTIVE:    Colonoscopy: 10/22/13, Dr. Kyle Elam, repeat 10 years  EGD: 4/16/16, Dr Kyle Elam, repeat 3 years, cancelled declines repeat  PSA: 1/13 dx'd with prostate cancer,  12/18 0.2, followed by Dr. Osmar Gomez, 12/19 0.3 declines further evaluation  AAA screen: 10/10, negative  Tdap: declines further   Pneumovax: 12/20/2019  Prevnar 13: 6/14/2017  Shingrix: both doses complete  Flu shot: Fall 2022   Eye exam: Dr. Olivia Castelan 3/22  Lipids: 9/22   A1c: 6/16 5.9, 12/16 5.7 2/22 5.8 9/22 5.5  Pain contract: 09/22  Urine drug screen: c/w tramadol, 9/22  Covid: 4 doses (Gabbi 6027)    Patient Active Problem List    Diagnosis Date Noted    Opioid use, unspecified with unspecified opioid-induced disorder 12/01/2021    Dilated aortic root ([facility] 08/05/2021    Mixed hyperlipidemia 08/05/2021    Primary osteoarthritis of both hands 06/13/2018    Prostate cancer ([facility] 02/18/2013    Chronic [facility], [facility] (moderate) [facility] 06/28/2011    Vitamin D deficiency 06/27/2011    DJD (degenerative joint disease), multiple sites 03/09/2010    GERD (gastroesophageal reflux disease) 03/09/2010    History of peptic ulcer disease 03/09/2010    Benign prostatic hyperplasia 03/09/2010    Perennial allergic rhinitis 03/09/2010     Current Outpatient Medications   Medication Sig Dispense Refill    traMADoL (ULTRAM) 50 mg tablet TAKE ONE TABLET BY MOUTH THREE TIMES A DAY AS NEEDED FOR PAIN (MAX 3 TABLETS DAILY) 90 Tablet 0    levocetirizine (XYZAL) 5 mg tablet TAKE ONE TABLET BY MOUTH EVERY NIGHT AT BEDTIME 90 Tablet 0    carvediloL (COREG) 6.25 mg tablet TAKE ONE TABLET BY MOUTH TWICE A  Tablet 3    lactulose (CHRONULAC) 10 gram/15 mL solution TAKE 5ML BY MOUTH THREE TIMES A DAY (Patient taking differently: daily as needed.) 473 mL 1     Past Surgical History:   Procedure Laterality Date    ENDOSCOPY, COLON, DIAGNOSTIC  2006    by [facility] MD; normal    HX CATARACT REMOVAL  12/28/2014    both eyes    HX COLONOSCOPY  10/22/2013    HX ENDOSCOPY      Followed by Dr. Marquis Koch (multiple)    HX HEENT  1970's    septoplasty    HX ORTHOPAEDIC  2000    right rotator cuff repair    HX ORTHOPAEDIC      right knee total replacement    HX PROSTATECTOMY  2010    green light laser by Dr. Rhett Mrakham UROLOGICAL  5/7/13    CYSTOSCOPY WITH OPTICAL INTERNAL URETHROTOMY, AND CRYOABLATION OF PROSTATE      Lab Results   Component Value Date/Time    WBC 6.2 09/07/2022 10:07 AM    HGB 14.0 09/07/2022 10:07 AM    HCT 42.9 09/07/2022 10:07 AM    PLATELET 419 95/69/3658 10:07 AM    MCV 99 (H) 09/07/2022 10:07 AM     Lab Results   Component Value Date/Time    Cholesterol, total 171 09/07/2022 10:07 AM    HDL Cholesterol 40 09/07/2022 10:07 AM    LDL, calculated 108 (H) 09/07/2022 10:07 AM    LDL, calculated 131 (H) 08/27/2020 10:36 AM    Triglyceride 125 09/07/2022 10:07 AM    CHOL/HDL Ratio 3.6 06/25/2010 08:36 AM     Lab Results   Component Value Date/Time    GFR est non-AA 36 (L) 02/24/2022 09:27 AM    GFR est AA 42 (L) 02/24/2022 09:27 AM    Creatinine 1.71 (H) 09/07/2022 10:07 AM    BUN 30 (H) 09/07/2022 10:07 AM    Sodium 143 09/07/2022 10:07 AM    Potassium 5.2 09/07/2022 10:07 AM    Chloride 104 09/07/2022 10:07 AM    CO2 22 09/07/2022 10:07 AM        Review of Systems   Respiratory:  Negative for shortness of breath. Cardiovascular:  Negative for chest pain. Physical Exam  Constitutional:       General: He is not in acute distress. Appearance: Normal appearance. He is not ill-appearing, toxic-appearing or diaphoretic. HENT:      Head: Normocephalic and atraumatic. Right Ear: External ear normal.      Left Ear: External ear normal.   Eyes:      General:         Right eye: No discharge. Left eye: No discharge. Conjunctiva/sclera: Conjunctivae normal.      Pupils: Pupils are equal, round, and reactive to light. Cardiovascular:      Rate and Rhythm: Normal rate and regular rhythm. Heart sounds: Murmur (slight) heard. No friction rub. No gallop. Pulmonary:      Effort: No respiratory distress. Breath sounds: Normal breath sounds. No wheezing or rales. Chest:      Chest wall: No tenderness. Musculoskeletal:         General: Normal range of motion. Cervical back: Normal.      Right lower leg: No edema. Left lower leg: Edema present. Skin:     General: Skin is warm and dry. Neurological:      General: No focal deficit present. Mental Status: He is oriented to person, place, and time. Gait: Gait normal.   Psychiatric:         Mood and Affect: Mood normal.         Behavior: Behavior normal.       ASSESSMENT and PLAN    ICD-10-CM ICD-9-CM    1.  Pro[facility]  C[address], BASIC   Patient with mild biochemical recurrence last time we checked his PSA which was a few years ago we have discussed this at every visit he is not interested in further evaluation and expresses understanding that this could mean we miss recurrent prostate cancer which is what biochemical recurrence means    He does not want to go back to see urology or evaluate further so we are not checking PSAs   HEMOGLOBIN A1C WITH EAG      2. Dilated aortic root (HCC)  I77.810 447.71 COMPLIANCE DRUG SCREEN/PRESCRIPTION MONITORING      METABOLIC PANEL, BASIC   Follows with cardiology up-to-date will be getting a repeat echocardiogram in 1 to 2 years   HEMOGLOBIN A1C WITH EAG      3. Stage 3a chronic kidney disease (HCC)  N18.31 585.3 COMPLIANCE DRUG SCREEN/PRESCRIPTION MONITORING      METABOLIC PANEL, BASIC   Creatinine has been stable runs around 1.5-1.7 I currently monitor this would send him to nephrology if there is a progressive decline   HEMOGLOBIN A1C WITH EAG      4. Mixed hyperlipidemia  E78.2 272.2 COMPLIANCE DRUG SCREEN/PRESCRIPTION MONITORING      METABOLIC PANEL, BASIC      HEMOGLOBIN A1C WITH EAG      5. Osteoarthritis of multiple joints, unspecified osteoarthritis type  M15.9 715.89 COMPLIANCE DRUG SCREEN/PRESCRIPTION MONITORING      METABOLIC PANEL, BASIC   Gets tramadol from me he has been stable on this no signs of abuse    He gets 90 tablets/month from me    He only uses about 2/day occasionally a third primarily in the winter months    This works well without being overly sedating or constipating    He actually is requiring less medication than he used to in the past which is good    He is not interested in seeing rheumatology any further    No signs of abuse we will get  for review urine drug screen with next labs    Pain contract has been updated   HEMOGLOBIN A1C WITH EAG      6.  History of peptic ulcer disease  Z87.11 V12.71 COMPLIANCE DRUG SCREEN/PRESCRIPTION MONITORING      METABOLIC PANEL, BASIC   Previously was on Pepcid he states he does not have any further symptoms he was having difficulty with eating and upset stomach previously but he states this is not bothering him he has been chronically eating 2 meals per day for quite some time    He was supposed to have a repeat endoscopy in 2019 but ultimately he canceled it discussed rescheduling this again especially given his weight loss but he declines at this time he states he will think about it and I did provide him information in case he changes his mind   HEMOGLOBIN A1C WITH EAG      7. Gastroesophageal reflux disease without esophagitis  K21.9 530.81 COMPLIANCE DRUG SCREEN/PRESCRIPTION MONITORING      METABOLIC PANEL, BASIC   See above   HEMOGLOBIN A1C WITH EAG      REFERRAL TO GASTROENTEROLOGY      8. IFG (impaired fasting glucose)  R73.01 790.21 COMPLIANCE DRUG SCREEN/PRESCRIPTION MONITORING   Check A1c with next labs   METABOLIC PANEL, BASIC      HEMOGLOBIN A1C WITH EAG      9. Weight loss  R63.4 783.21 REFERRAL TO GASTROENTEROLOGY   Weight is in the normal range the patient has lost a fair amount of weight over the last 1 year weight slowly has dwindled down    He attributes this to eating just 2 meals per day but he has been eating 2 meals per day for about a year now so I discussed that this should require further evaluation he is not interested in further evaluation    Discussed he is overdue for endoscopy discussed the biochemical recurrence of the prostate cancer discussed CT of the abdomen he will let me know if he changes his mind and wants to pursue further evaluation he does have the information for gastroenterology which I printed off discussed all this at great length with him he understands that not doing further evaluation could mean missed malignancy and delayed treatment     Depression screen reviewed and negative. Scribed by Sabrina Manual, as dictated by Dr. Silver Damon. Current diagnosis and concerns discussed with pt at length. Pt understands risks and benefits or current treatment plan and medications, and accepts the treatment and medication with any possible risks. Pt asks appropriate questions, which were answered.  Pt was instructed to call with any concerns or problems. I have reviewed the note documented by the scribe. The services provided are my own. The documentation is accurate.

## 2022-12-13 ENCOUNTER — OFFICE VISIT (OUTPATIENT)
Dept: INTERNAL MEDICINE CLINIC | Age: 87
End: 2022-12-13
Payer: MEDICARE

## 2022-12-13 VITALS
OXYGEN SATURATION: 96 % | WEIGHT: 172.6 LBS | SYSTOLIC BLOOD PRESSURE: 135 MMHG | HEIGHT: 70 IN | BODY MASS INDEX: 24.71 KG/M2 | RESPIRATION RATE: 16 BRPM | DIASTOLIC BLOOD PRESSURE: 69 MMHG | TEMPERATURE: 97.9 F | HEART RATE: 72 BPM

## 2022-12-13 DIAGNOSIS — I77.810 DILATED AORTIC ROOT (HCC): ICD-10-CM

## 2022-12-13 DIAGNOSIS — K21.9 GASTROESOPHAGEAL REFLUX DISEASE WITHOUT ESOPHAGITIS: ICD-10-CM

## 2022-12-13 DIAGNOSIS — M15.9 OSTEOARTHRITIS OF MULTIPLE JOINTS, UNSPECIFIED OSTEOARTHRITIS TYPE: ICD-10-CM

## 2022-12-13 DIAGNOSIS — C61 PROSTATE CANCER (HCC): Primary | ICD-10-CM

## 2022-12-13 DIAGNOSIS — E78.2 MIXED HYPERLIPIDEMIA: ICD-10-CM

## 2022-12-13 DIAGNOSIS — N18.31 STAGE 3A CHRONIC KIDNEY DISEASE (HCC): ICD-10-CM

## 2022-12-13 DIAGNOSIS — R63.4 WEIGHT LOSS: ICD-10-CM

## 2022-12-13 DIAGNOSIS — Z87.11 HISTORY OF PEPTIC ULCER DISEASE: ICD-10-CM

## 2022-12-13 DIAGNOSIS — R73.01 IFG (IMPAIRED FASTING GLUCOSE): ICD-10-CM

## 2022-12-13 PROCEDURE — 1101F PT FALLS ASSESS-DOCD LE1/YR: CPT | Performed by: INTERNAL MEDICINE

## 2022-12-13 PROCEDURE — 99214 OFFICE O/P EST MOD 30 MIN: CPT | Performed by: INTERNAL MEDICINE

## 2022-12-13 PROCEDURE — G8536 NO DOC ELDER MAL SCRN: HCPCS | Performed by: INTERNAL MEDICINE

## 2022-12-13 PROCEDURE — G8427 DOCREV CUR MEDS BY ELIG CLIN: HCPCS | Performed by: INTERNAL MEDICINE

## 2022-12-13 PROCEDURE — G8420 CALC BMI NORM PARAMETERS: HCPCS | Performed by: INTERNAL MEDICINE

## 2022-12-13 PROCEDURE — G8510 SCR DEP NEG, NO PLAN REQD: HCPCS | Performed by: INTERNAL MEDICINE

## 2022-12-13 NOTE — PROGRESS NOTES
1. \"Have you been to the ER, urgent care clinic since your last visit? Hospitalized since your last visit? \" No    2. \"Have you seen or consulted any other health care providers outside of the [address] [place] [address] since your last visit? \" No     3. For patients aged 39-70: Has the patient had a colonoscopy / FIT/ Cologuard? Yes - no Care Gap present      If the patient is female:    4. For patients aged 41-77: Has the patient had a mammogram within the past 2 years? NA - based on age or sex      11. For patients aged 21-65: Has the patient had a pap smear?  NA - based on age or sex

## 2023-01-09 DIAGNOSIS — M19.041 PRIMARY OSTEOARTHRITIS OF BOTH HANDS: Primary | ICD-10-CM

## 2023-01-09 DIAGNOSIS — M19.042 PRIMARY OSTEOARTHRITIS OF BOTH HANDS: Primary | ICD-10-CM

## 2023-01-09 RX ORDER — TRAMADOL HYDROCHLORIDE 50 MG/1
TABLET ORAL
Qty: 90 TABLET | Refills: 0 | Status: SHIPPED | OUTPATIENT
Start: 2023-01-09 | End: 2023-02-08

## 2023-02-20 DIAGNOSIS — G89.4 CHRONIC PAIN SYNDROME: ICD-10-CM

## 2023-02-20 RX ORDER — TRAMADOL HYDROCHLORIDE 50 MG/1
TABLET ORAL
Qty: 90 TABLET | Refills: 0 | Status: SHIPPED | OUTPATIENT
Start: 2023-02-20 | End: 2023-03-22

## 2023-02-20 RX ORDER — LEVOCETIRIZINE DIHYDROCHLORIDE 5 MG/1
TABLET, FILM COATED ORAL
Qty: 90 TABLET | Refills: 0 | Status: SHIPPED | OUTPATIENT
Start: 2023-02-20

## 2023-03-13 NOTE — PROGRESS NOTES
HISTORY OF PRESENT ILLNESS  Edie Shine is a 80 y.o. male. HPI  Last here 12/13/22. Pt is here to f/u on chronic conditions. Has a history of hypertension  BP today is 109/70  He is taking coreg 6.25 mg BID for cardiomyopathy  No longer on lisinopril 5mg daily to protect his kidneys d/t high k   He is concerned about the effect of coreg on kidneys. Discussed okay to take     Wt today is 158 lbs, down 15 lbs since lov , recall last office visit had been down 16 pounds over the last year so a total of over 30 pounds in the last couple of years  Discussed recent wt/l with him at length discussed evaluating this further to find out what is going on  Recall last EGD 2016   Previously discussed an EGD would be a good idea due to weight loss and hx of stomach upset. Discussed getting a CT of the abdomen for further evaluation as well  Discussed again at length my recommendation is to see Dr. Tre Leary (GI) to rule out GI cancer, gave his information. He will think about this. Discussed options for evaluation he is not interested in any evaluation he states he will think about it, I have discussed that if we do not evaluate this further we do potentially miss diagnosis of cancer he expressed understanding he is not interested in further evaluation at this time. --Discussed all of this again today at great length expressed my concerns at great length he states he understands but is not interested in pursuing evaluation he might consider seeing GI  States he is now only having one cup of coffee as opposed to two, this has helped his stomach significantly. He states he did change this in December  Discussed the above again today and that I still recommend he see Dr. Tre Leary and that he should have a CT abd. He has the information to call if he changes his mind.   Ultimately he is not really planning on any additional follow through     Reviewed labs   Ordered labs     Previously pt c/o R knee pain and buckling and arthritis has not seen Ortho since last visit     Previously, I referred the pt to Dr. Cedric Dolan (rheum) for history of osteoarthritis he has not decided to go     Pt follows with Dr. Luis Antonio Flores (cardio) for decreased EF cardiomyopathy which is improved and dilated aorta he is on Coreg  Last visit 11/10/22   Will f/U in 11/23. Gave information again  Denies SOB. Recall echo 6/21/21:  LV: Estimated LVEF is 35 - 40%. Normal cavity size and wall thickness. Recall stress test 7/23/21:  SPECT: Left ventricular perfusion is normal. Myocardial perfusion imaging supports a low risk stress test.     Pt previously followed with Dr. Aarti Green (uro) for prostate cancer, not interested in follow up I discussed this with him again at length today  Last PSA 0.3 - rising   We are no longer monitoring PSAs patient is not interested--we discussed this at great length again today!!!!  Especially in light of weight loss  I have discussed this multiple times with patient he is not interested in following up with urology no longer checking his PSAs I have discussed that this means he likely has biochemical recurrence of his prostate cancer he is aware not interested in follow-up  Recall3/30/16:  PSA undetectable,  Recall pt's prostate is removed  He is clear that he does not want this checked or evaluated at all.   Not interested in seeing urology     Should take over-the-counter vitamin D      No longer taking pepcid-- no current issues with heartburn since switching to 2 meals per day      Continues on xyzal qhs for allergies - he continues to struggle with allergies   Recall pt is allergic to fungus and pet dander     Taking lactulose prn, not very often - has only used it twice since lov     I give him tramadol for his back and hand pain  Now he is getting 90 tablets q1 months- about 2 per day, occasionally 3, generally lasts a month and a half  Will take an extra tab when his hands are bothering him a lot primarily when it is cold out or raining  Pt knows not to give this to anyone else  This helps with his pain  Not sedating or overly constipating   Pt also has percocet to use prn in the past he no longer has been using this   Pain contract signed on 09/22  Urine drug screen complete 9/22, will complete today 3/23     Has mild CKD  This is been stable for years  Has CKD I monitor his renal function creatinine runs around 1.5-1.7 baseline  Has renal US in 6/2010 did show some hydronephrosis at that time and he saw urology for evaluation    Pt lives alone  Grab bars in the shower   Denies difficulty hearing      Pt is functionally independent   Does own laundry  Does own driving  Does own bills and meds    No memory concerns   Knows the month, date, year, location   Can recall 3/3 objects      ACP not on file. SDM is his son Daria Murcia). Recall he has a dx of prostate cancer since January 2013 , previously saw calin annually and last saw him in January 2015--states he was released from care at that time. Recall the patient has h/o ckd, baseline 1.6-1.7  Recall that he Used to follow with rheumatologist dr Giovani Davenport years ago --currently not seeing anyone. Recall the patient has hansens and a h/o stomach ulcer, previously saw [facility] for this.  Sx controlled with prilosec bid in the past no longer takes anything, not interested in going back discussed this in terms of his weight loss again he declines further evaluation see above      PREVENTIVE:    Colonoscopy: 10/22/13, Dr. Alfonso Figures, repeat 10 years, none further   EGD: 4/16/16, Dr Alfonso Figures, repeat 3 years, cancelled declines repeat  PSA: 1/13 dx'd with prostate cancer,  12/18 0.2, followed by Dr. Tony Sharp, 12/19 0.3 declines further evaluation  AAA screen: 10/10, negative  Tdap: declines further   Pneumovax: 12/20/2019  Prevnar 13: 6/14/2017  Shingrix: both doses complete  Flu shot: Fall 2022   Eye exam: Dr. Nicole Oscar 1/23   Lipids: 9/22   A1c: 6/16 5.9, 12/16 5.7 2/22 5.8 9/22 5.5   Pain contract: 09/22  Urine drug screen: c/w tramadol, 9/22  Covid: 5 doses (Hawa Regalado), including bivalent in 11/22     Patient Active Problem List    Diagnosis Date Noted    Opioid use, unspecified with unspecified opioid-induced disorder 12/01/2021    Dilated aortic root ([facility] 08/05/2021    Mixed hyperlipidemia 08/05/2021    Primary osteoarthritis of both hands 06/13/2018    Prostate cancer ([facility] 02/18/2013    Chronic kidney disease, stage III (moderate) (HCC) 06/28/2011    Vitamin D deficiency 06/27/2011    DJD (degenerative joint disease), multiple sites 03/09/2010    GERD (gastroesophageal reflux disease) 03/09/2010    History of peptic ulcer disease 03/09/2010    Benign prostatic hyperplasia 03/09/2010    Perennial allergic rhinitis 03/09/2010     Current Outpatient Medications   Medication Sig Dispense Refill    traMADoL (ULTRAM) 50 mg tablet TAKE ONE TABLET BY MOUTH THREE TIMES A DAY AS NEEDED FOR PAIN **MAX DAILY DOSE: 3 TABLETS** 90 Tablet 0    levocetirizine (XYZAL) 5 mg tablet TAKE ONE TABLET BY MOUTH EVERY NIGHT AT BEDTIME 90 Tablet 0    carvediloL (COREG) 6.25 mg tablet TAKE ONE TABLET BY MOUTH TWICE A  Tablet 3    lactulose (CHRONULAC) 10 gram/15 mL solution TAKE 5ML BY MOUTH THREE TIMES A DAY (Patient taking differently: daily as needed.) 473 mL 1      Lab Results   Component Value Date/Time    WBC 6.2 09/07/2022 10:07 AM    HGB 14.0 09/07/2022 10:07 AM    HCT 42.9 09/07/2022 10:07 AM    PLATELET 866 32/38/4822 10:07 AM    MCV 99 (H) 09/07/2022 10:07 AM     Lab Results   Component Value Date/Time    Cholesterol, total 171 09/07/2022 10:07 AM    HDL Cholesterol 40 09/07/2022 10:07 AM    LDL, calculated 108 (H) 09/07/2022 10:07 AM    LDL, calculated 131 (H) 08/27/2020 10:36 AM    Triglyceride 125 09/07/2022 10:07 AM    CHOL/HDL Ratio 3.6 06/25/2010 08:36 AM     Lab Results   Component Value Date/Time    GFR est non-AA 36 (L) 02/24/2022 09:27 AM    GFR est AA 42 (L) 02/24/2022 09:27 AM Creatinine 1.71 (H) 09/07/2022 10:07 AM    BUN 30 (H) 09/07/2022 10:07 AM    Sodium 143 09/07/2022 10:07 AM    Potassium 5.2 09/07/2022 10:07 AM    Chloride 104 09/07/2022 10:07 AM    CO2 22 09/07/2022 10:07 AM        Review of Systems   Respiratory:  Negative for shortness of breath. Cardiovascular:  Negative for chest pain. Physical Exam  Constitutional:       General: He is not in acute distress. Appearance: Normal appearance. He is not ill-appearing, toxic-appearing or diaphoretic. HENT:      Head: Normocephalic and atraumatic. Right Ear: External ear normal.      Left Ear: External ear normal.   Eyes:      General:         Right eye: No discharge. Left eye: No discharge. Conjunctiva/sclera: Conjunctivae normal.      Pupils: Pupils are equal, round, and reactive to light. Neck:      Vascular: No carotid bruit. Cardiovascular:      Rate and Rhythm: Normal rate and regular rhythm. Heart sounds: No murmur heard. No friction rub. No gallop. Pulmonary:      Effort: No respiratory distress. Breath sounds: Normal breath sounds. No wheezing or rales. Chest:      Chest wall: No tenderness. Abdominal:      Palpations: Abdomen is soft. Tenderness: There is no abdominal tenderness. Musculoskeletal:         General: Normal range of motion. Cervical back: Normal.      Right lower leg: No edema. Left lower leg: No edema. Skin:     General: Skin is warm and dry. Neurological:      General: No focal deficit present. Mental Status: He is oriented to person, place, and time. Gait: Gait normal.   Psychiatric:         Mood and Affect: Mood normal.         Behavior: Behavior normal.       ASSESSMENT and PLAN    ICD-10-CM ICD-9-CM    1.  Gastroesophageal reflux disease without esophagitis  K21.9 530.81 REFERRAL TO GASTROENTEROLOGY       Patient previously followed with gastroenterology previously was on Pepcid    He had noted having a lot of stomach upset which he initially told me improved with decreasing his meals to 2 meals per day which she has been doing for quite some time well over a year now he states he has decreased his coffee to just once per day since December and he feels that this has improved his stomach upset    I discussed with him again that he was supposed to have a endoscopy back in 2019 but he canceled it and declined further evaluation or repeat    I also discussed with him that he is now lost about 30 pounds in the last 2 years or so that I recommend evaluation at minimum he needs to see GI he should follow through with getting a repeat endoscopy    Discussed getting a CT scan given his stomach problems/pain and significant weight loss    He is not interested in a CT scan he feels that his stomach is better since he made changes in his diet however his weight continues to drop despite these changes I expressed on my concerns ultimately he does not want additional imaging he will take the referral for gastroenterology but he may not pursue it   2. Medicare annual wellness visit, subsequent  Z00.00 V70.0       3. Prostate cancer ([facility]  C61 185    Discussed on multiple occasions including today that he has had biochemical recurrence    Discussed that prostate cancer can recur    He is not interested in seeing urology and is not interested in monitoring PSAs any further discussed this at length again   4. Opioid use, unspecified with unspecified opioid-induced disorder ([facility]  F11.99 292.9      305.50    Patient with arthritis in his hands takes tramadol for me gets 90 tablets/month    Takes approximately 2/day on occasion will take 3/day overall this works well reviewed  today    Pain contract is up-to-date    Urine drug screen will be completed today    No signs of abuse    Works well without being overly sedating or constipating uses lactulose sporadically but not frequently   5.  Dilated aortic root (HCC)  I77.810 447.71    Follows with cardiology annually and November 6. Stage 3a chronic kidney disease (HCC)  N18.31 585. 3    Creatinine has been stable running around 1.6-1.7 baseline we will repeat labs today   7. Mixed hyperlipidemia  E78.2 272.2    Diet controlled   8. Perennial allergic rhinitis  J30.89 477.8    Improved in size I will continue   9. Weight loss  R63.4 783.21 REFERRAL TO GASTROENTEROLOGY   See above     Depression screen reviewed and negative. Scribed by Alessandro Velez, as dictated by Dr. Susanna White. Current diagnosis and concerns discussed with pt at length. Pt understands risks and benefits or current treatment plan and medications, and accepts the treatment and medication with any possible risks. Pt asks appropriate questions, which were answered. Pt was instructed to call with any concerns or problems. I have reviewed the note documented by the scribe. The services provided are my own. The documentation is accurate.

## 2023-03-13 NOTE — PATIENT INSTRUCTIONS
Medicare Wellness Visit, Male    The best way to live healthy is to have a lifestyle where you eat a well-balanced diet, exercise regularly, limit alcohol use, and quit all forms of tobacco/nicotine, if applicable. Regular preventive services are another way to keep healthy. Preventive services (vaccines, screening tests, monitoring & exams) can help personalize your care plan, which helps you manage your own care. Screening tests can find health problems at the earliest stages, when they are easiest to treat. [facility] follows the current, evidence-based guidelines published by the [facility] Atilio Victorino ([facility]) when recommending preventive services for our patients. Because we follow these guidelines, sometimes recommendations change over time as research supports it. (For example, a prostate screening blood test is no longer routinely recommended for men with no symptoms). Of course, you and your doctor may decide to screen more often for some diseases, based on your risk and co-morbidities (chronic disease you are already diagnosed with). Preventive services for you include:  - Medicare offers their members a free annual wellness visit, which is time for you and your primary care provider to discuss and plan for your preventive service needs.  Take advantage of this benefit every year!    -Over the age of 72 should receive the recommended pneumonia vaccines.   -All adults should have a flu vaccine yearly.  -All adults should receive a tetanus vaccine every 10 years.  -Over the age of 48 should receive the shingles vaccines.    -All adults should be screened once for Hepatitis C.  -All adults age 38-68 who are overweight should have a diabetes screening test once every three years.   -Other screening tests & preventive services for persons with diabetes include: an eye exam to screen for diabetic retinopathy, a kidney function test, a foot exam, and stricter control over your cholesterol.   -Cardiovascular screening for adults with routine risk involves an electrocardiogram (ECG) at intervals determined by the provider.     -Colorectal cancer screening should be done for adults age 43-69 with no increased risk factors for colorectal cancer. There are a number of acceptable methods of screening for this type of cancer. Each test has its own benefits and drawbacks. Discuss with your provider what is most appropriate for you during your annual wellness visit. The different tests include: colonoscopy (considered the best screening method), a fecal occult blood test, a fecal DNA test, and sigmoidoscopy.    -Lung cancer screening is recommended annually with a low dose CT scan for adults between age 54 and 68, who have smoked at least 30 pack years (equivalent of 1 pack per day for 30 days), and who is a current smoker or quit less than 15 years ago. -An Abdominal Aortic Aneurysm (AAA) Screening is recommended for men age 73-68 who has ever smoked in their lifetime.      Here is a list of your current Health Maintenance items (your personalized list of preventive services) with a due date:  Health Maintenance Due   Topic Date Due    DTaP/Tdap/Td  (1 - Tdap) Never done    Annual Well Visit  03/03/2023

## 2023-03-13 NOTE — PROGRESS NOTES
This is the Subsequent Medicare Annual Wellness Exam, performed 12 months or more after the Initial AWV or the last Subsequent AWV    I have reviewed the patient's medical history in detail and updated the computerized patient record. Assessment/Plan   Education and counseling provided:  Are appropriate based on today's review and evaluation    1. Medicare annual wellness visit, subsequent     Depression Risk Factor Screening     3 most recent PHQ Screens 3/14/2023   Little interest or pleasure in doing things Not at all   Feeling down, depressed, irritable, or hopeless Not at all   Total Score PHQ 2 0       Alcohol & Drug Abuse Risk Screen    Do you average more than 1 drink per night or more than 7 drinks a week: No    In the past three months have you have had more than 4 drinks containing alcohol on one occasion: No       Opioid Risk: (Low risk score <55, High risk score ?55)  Opioid risk score: 10      Click here to complete the Controlled Substance Monitoring SmartForm    Last PDMP [place] as Reviewed:  Review User Review Instant Review Result              Functional Ability and Level of Safety    Hearing:  mild decrease , not interested in eval       Activities of Daily Living: The home contains: handrails and grab bars  Patient does total self care      Ambulation: with difficulty, uses a cane     Fall Risk:  Fall Risk Assessment, last 12 mths 3/14/2023   Able to walk? Yes   Fall in past 12 months? 0   Do you feel unsteady?  0   Are you worried about falling 0      Abuse Screen:  Patient is not abused   Lives alone    Cognitive Screening    Has your family/caregiver stated any concerns about your memory: no     Cognitive Screening: Normal - Mini Cog Test      No memory concerns   Pt knows the month, day and year   Can recall 3/3 objects      Health Maintenance Due     Health Maintenance Due   Topic Date Due    DTaP/Tdap/Td series (1 - Tdap) Never done    Medicare Yearly Exam  03/03/2023       Patient Care Team   Patient Care Team:  Jaqueline Toth MD as PCP - General (Internal Medicine Physician)  Jaqueline Toth MD as PCP - [facility] Provider  Izabel Wallace (Dental General Practice)  Reji Pierce MD (Urology)  Madi Bassett MD (Inactive) (Orthopedic Surgery)  Clarita Montanez (Dermatology Physician)  Nhan Perez MD (Gastroenterology)  Clemente Kang MD (Optometry)  Charline Vanegas MD as Physician (Cardiovascular Disease Physician)    History     Patient Active Problem List   Diagnosis Code    DJD (degenerative joint disease), multiple sites M15.9    GERD (gastroesophageal reflux disease) K21.9    History of peptic ulcer disease Z87.11    Benign prostatic hyperplasia N40.0    Perennial allergic rhinitis J30.89    Vitamin D deficiency E55.9    Chronic kidney disease, stage III (moderate) ([address] N18.30    Prostate cancer ([address] C61    Primary osteoarthritis of both hands M19.041, M19.042    Dilated aortic root ([address] I77.810    Mixed hyperlipidemia E78.2    Opioid use, unspecified with unspecified opioid-induced disorder F11.99     Past Medical History:   Diagnosis Date    Acute systolic congestive heart failure ([address] 8/5/2021    Arthritis     severe diffuse DJD    Calculus of kidney 1988    2 bouts    Cancer ([address]     prostate    Chronic kidney disease 2010 urinary obstruction diagnosed    due to obstructive uropathy and ? NSAID use long term    GERD (gastroesophageal reflux disease) 6/2009    with mild Chin's    Hematuria, microscopic 1970's ?    negative cysto and imaging    Mixed hyperlipidemia 8/5/2021    Murmur     Other ill-defined conditions(799.89) 2006    blood transfusion hx    Perennial allergic rhinitis     mold --per Dr. Charan Gonsalez    PUD (peptic ulcer disease) 2006    stomach    Syncope 8/21/2011      Past Surgical History:   Procedure Laterality Date    ENDOSCOPY, COLON, DIAGNOSTIC  2006    by [facility] MD; normal    HX CATARACT REMOVAL  12/28/2014    both eyes HX COLONOSCOPY  10/22/2013    HX ENDOSCOPY      Followed by Dr. Zainab Geller (multiple)    HX HEENT  1970's    septoplasty    HX ORTHOPAEDIC  2000    right rotator cuff repair    HX ORTHOPAEDIC      right knee total replacement    HX PROSTATECTOMY  2010    green light laser by Dr. Ronn Holter  13    CYSTOSCOPY WITH OPTICAL INTERNAL URETHROTOMY, AND CRYOABLATION OF PROSTATE     Current Outpatient Medications   Medication Sig Dispense Refill    traMADoL (ULTRAM) 50 mg tablet TAKE ONE TABLET BY MOUTH THREE TIMES A DAY AS NEEDED FOR PAIN **MAX DAILY DOSE: 3 TABLETS** 90 Tablet 0    levocetirizine (XYZAL) 5 mg tablet TAKE ONE TABLET BY MOUTH EVERY NIGHT AT BEDTIME 90 Tablet 0    carvediloL (COREG) 6.25 mg tablet TAKE ONE TABLET BY MOUTH TWICE A  Tablet 3    lactulose (CHRONULAC) 10 gram/15 mL solution TAKE 5ML BY MOUTH THREE TIMES A DAY (Patient taking differently: daily as needed.) 473 mL 1     Allergies   Allergen Reactions    Molds Extract Unknown (comments)       Family History   Problem Relation Age of Onset    Heart Disease Mother     Stroke Mother     Hypertension Mother     OSTEOARTHRITIS Mother         neck and spine; back surgeries x5    Dementia Father         Alzheimer's type    Pneumonia Father         cause of death    OSTEOARTHRITIS Sister     Other Son         Biospy (unsure where)    No Known Problems Daughter      Social History     Tobacco Use    Smoking status: Former     Packs/day: 2.00     Years: 10.00     Pack years: 20.00     Types: Cigarettes     Quit date: 1961     Years since quittin.1    Smokeless tobacco: Never    Tobacco comments:     When quit - over 3ppd   Substance Use Topics    Alcohol use: No     Alcohol/week: 0.0 standard drinks     Comment: quit completely in       ACP not on file. SDM is his son Enrique Stager).          Colonoscopy: 10/22/13, Dr. Zainab Geller, repeat 10 years  EGD: 16, Dr Zainab Geller, repeat 3 years, cancelled declines repeat  PSA:  dx'd with prostate cancer,, 12/19 0.3 declines further evaluation  AAA screen: 10/10, negative    Tdap: declines further   Pneumovax: 12/20/2019  Prevnar 13: 6/14/2017  Shingrix: both doses complete  Flu shot: Fall 2022     Eye exam: Dr. Deann Salazar 1/23 annual     Lipids: 9/22  annual   A1c:  9/22 5.5 due    Covid: 5 doses (pfizer)    Medication reconciliation completed by MA and reviewed by me. Medical/surgical/social/family history reviewed and updated by me. Patient provided AVS and preventative screening table. Patient verbalized understanding of all information discussed.      Bakari Menjivar MD

## 2023-03-14 ENCOUNTER — OFFICE VISIT (OUTPATIENT)
Dept: INTERNAL MEDICINE CLINIC | Age: 88
End: 2023-03-14
Payer: MEDICARE

## 2023-03-14 VITALS
OXYGEN SATURATION: 97 % | TEMPERATURE: 98.6 F | BODY MASS INDEX: 22.62 KG/M2 | HEART RATE: 75 BPM | SYSTOLIC BLOOD PRESSURE: 109 MMHG | RESPIRATION RATE: 16 BRPM | WEIGHT: 158 LBS | HEIGHT: 70 IN | DIASTOLIC BLOOD PRESSURE: 70 MMHG

## 2023-03-14 DIAGNOSIS — C61 PROSTATE CANCER (HCC): ICD-10-CM

## 2023-03-14 DIAGNOSIS — R73.01 IFG (IMPAIRED FASTING GLUCOSE): ICD-10-CM

## 2023-03-14 DIAGNOSIS — J30.89 PERENNIAL ALLERGIC RHINITIS: ICD-10-CM

## 2023-03-14 DIAGNOSIS — R63.4 WEIGHT LOSS: ICD-10-CM

## 2023-03-14 DIAGNOSIS — I77.810 DILATED AORTIC ROOT (HCC): ICD-10-CM

## 2023-03-14 DIAGNOSIS — Z87.11 HISTORY OF PEPTIC ULCER DISEASE: ICD-10-CM

## 2023-03-14 DIAGNOSIS — Z00.00 MEDICARE ANNUAL WELLNESS VISIT, SUBSEQUENT: ICD-10-CM

## 2023-03-14 DIAGNOSIS — E78.2 MIXED HYPERLIPIDEMIA: ICD-10-CM

## 2023-03-14 DIAGNOSIS — K21.9 GASTROESOPHAGEAL REFLUX DISEASE WITHOUT ESOPHAGITIS: Primary | ICD-10-CM

## 2023-03-14 DIAGNOSIS — F11.99 OPIOID USE, UNSPECIFIED WITH UNSPECIFIED OPIOID-INDUCED DISORDER (HCC): ICD-10-CM

## 2023-03-14 DIAGNOSIS — N18.31 STAGE 3A CHRONIC KIDNEY DISEASE (HCC): ICD-10-CM

## 2023-03-14 DIAGNOSIS — M15.9 OSTEOARTHRITIS OF MULTIPLE JOINTS, UNSPECIFIED OSTEOARTHRITIS TYPE: ICD-10-CM

## 2023-03-14 PROCEDURE — G0439 PPPS, SUBSEQ VISIT: HCPCS | Performed by: INTERNAL MEDICINE

## 2023-03-14 PROCEDURE — 99214 OFFICE O/P EST MOD 30 MIN: CPT | Performed by: INTERNAL MEDICINE

## 2023-03-14 PROCEDURE — 1101F PT FALLS ASSESS-DOCD LE1/YR: CPT | Performed by: INTERNAL MEDICINE

## 2023-03-14 PROCEDURE — G8510 SCR DEP NEG, NO PLAN REQD: HCPCS | Performed by: INTERNAL MEDICINE

## 2023-03-14 PROCEDURE — G8420 CALC BMI NORM PARAMETERS: HCPCS | Performed by: INTERNAL MEDICINE

## 2023-03-14 PROCEDURE — G8427 DOCREV CUR MEDS BY ELIG CLIN: HCPCS | Performed by: INTERNAL MEDICINE

## 2023-03-14 PROCEDURE — G8536 NO DOC ELDER MAL SCRN: HCPCS | Performed by: INTERNAL MEDICINE

## 2023-03-14 NOTE — PROGRESS NOTES
1. \"Have you been to the ER, urgent care clinic since your last visit? Hospitalized since your last visit? \" No    2. \"Have you seen or consulted any other health care providers outside of the [address] [place] [address] since your last visit? \" No     3. For patients aged 39-70: Has the patient had a colonoscopy / FIT/ Cologuard?  NA - based on age

## 2023-03-15 LAB
ANION GAP SERPL CALC-SCNC: 7 MMOL/L (ref 5–15)
BUN SERPL-MCNC: 41 MG/DL (ref 6–20)
BUN/CREAT SERPL: 21 (ref 12–20)
CALCIUM SERPL-MCNC: 9.5 MG/DL (ref 8.5–10.1)
CHLORIDE SERPL-SCNC: 109 MMOL/L (ref 97–108)
CO2 SERPL-SCNC: 26 MMOL/L (ref 21–32)
CREAT SERPL-MCNC: 1.91 MG/DL (ref 0.7–1.3)
EST. AVERAGE GLUCOSE BLD GHB EST-MCNC: 126 MG/DL
GLUCOSE SERPL-MCNC: 100 MG/DL (ref 65–100)
HBA1C MFR BLD: 6 % (ref 4–5.6)
POTASSIUM SERPL-SCNC: 5 MMOL/L (ref 3.5–5.1)
SODIUM SERPL-SCNC: 142 MMOL/L (ref 136–145)

## 2023-03-16 NOTE — PROGRESS NOTES
Please call patient back about results  Slight decrease in kidney fxn compared to baseline  Lets repeat renal US as it has been awhile  Will repeat labs at f/U to include bmp, spep, upep UA

## 2023-03-21 LAB — DRUGS UR: NORMAL

## 2023-03-22 DIAGNOSIS — N18.31 STAGE 3A CHRONIC KIDNEY DISEASE (HCC): Primary | ICD-10-CM

## 2023-03-31 ENCOUNTER — HOSPITAL ENCOUNTER (OUTPATIENT)
Dept: ULTRASOUND IMAGING | Age: 88
Discharge: HOME | End: 2023-03-31
Attending: INTERNAL MEDICINE
Payer: MEDICARE

## 2023-03-31 DIAGNOSIS — N18.31 STAGE 3A CHRONIC KIDNEY DISEASE (HCC): ICD-10-CM

## 2023-03-31 PROCEDURE — 76770 US EXAM ABDO BACK WALL COMP: CPT

## 2023-05-19 DIAGNOSIS — M19.041 PRIMARY OSTEOARTHRITIS OF BOTH HANDS: Primary | ICD-10-CM

## 2023-05-19 DIAGNOSIS — M19.042 PRIMARY OSTEOARTHRITIS OF BOTH HANDS: Primary | ICD-10-CM

## 2023-05-20 RX ORDER — LEVOCETIRIZINE DIHYDROCHLORIDE 5 MG/1
TABLET, FILM COATED ORAL
Qty: 90 TABLET | Refills: 0 | Status: SHIPPED | OUTPATIENT
Start: 2023-05-20

## 2023-05-20 RX ORDER — TRAMADOL HYDROCHLORIDE 50 MG/1
TABLET ORAL
Qty: 90 TABLET | Refills: 0 | Status: SHIPPED | OUTPATIENT
Start: 2023-05-20 | End: 2023-06-19

## 2023-07-05 DIAGNOSIS — M15.9 PRIMARY OSTEOARTHRITIS INVOLVING MULTIPLE JOINTS: Primary | ICD-10-CM

## 2023-07-06 RX ORDER — TRAMADOL HYDROCHLORIDE 50 MG/1
TABLET ORAL
Qty: 90 TABLET | Refills: 0 | Status: SHIPPED | OUTPATIENT
Start: 2023-07-06 | End: 2023-08-05

## 2023-08-15 DIAGNOSIS — M15.9 PRIMARY OSTEOARTHRITIS INVOLVING MULTIPLE JOINTS: Primary | ICD-10-CM

## 2023-08-15 RX ORDER — TRAMADOL HYDROCHLORIDE 50 MG/1
TABLET ORAL
Qty: 90 TABLET | Refills: 0 | Status: SHIPPED | OUTPATIENT
Start: 2023-08-15 | End: 2023-09-14

## 2023-08-15 RX ORDER — LEVOCETIRIZINE DIHYDROCHLORIDE 5 MG/1
TABLET, FILM COATED ORAL
Qty: 90 TABLET | Refills: 0 | Status: SHIPPED | OUTPATIENT
Start: 2023-08-15

## 2023-09-11 ASSESSMENT — ENCOUNTER SYMPTOMS: SHORTNESS OF BREATH: 0

## 2023-09-11 NOTE — PROGRESS NOTES
HISTORY OF PRESENT ILLNESS  Maciej Smith is a 80 y.o. male. HPI    Last here 6/13/23. Pt is here to f/u on chronic conditions. Has a history of hypertension   BP today is 151/65 barrett [id number]/78 on repeat  No home BP readings for review  He is taking coreg 6.25 mg BID for cardiomyopathy  No longer on lisinopril 5mg daily to protect his kidneys d/t high k      Wt today is 163 lbs,  stable since lov       Recall a total of over 30 pounds in the last couple of years--this visit weight is stable and increased  Recall last EGD 2016   Previously discussed an EGD would be a good idea due to weight loss and hx of stomach upset. Discussed getting a CT of the abdomen for further evaluation as well  Recall discussed at length my recommendation is to see Dr. Bay Grewal (GI) to rule out GI cancer, gave his information. Discussed options for evaluation he is not interested in any evaluation he states he will think about it, I have discussed that if we do not evaluate this further we do potentially miss diagnosis of cancer he expressed understanding he is not interested in further evaluation at this time. -- Weight has stabilized today  Recall states he is now only having one cup of coffee as opposed to two, this has helped his stomach significantly. He states he did change this in December  Discussed the above again today and that I still recommend he see Dr. Bay Grewal and that he should have a CT abd. He has the information to call if he changes his mind.   Ultimately he is not really planning on any additional follow up     Reviewed labs   Ordered labs     Previously pt c/o R knee pain and buckling and arthritis has not seen Ortho since last visit     Previously, I referred the pt to Dr. Michelle Zamora (rheum) for history of osteoarthritis he has not decided to go     Pt follows with Dr. Win Marks (cardio) for decreased EF cardiomyopathy which is improved and dilated aorta he is on Coreg  Last visit 11/10/22   Will f/U

## 2023-09-18 ENCOUNTER — OFFICE VISIT (OUTPATIENT)
Age: 88
End: 2023-09-18
Payer: MEDICARE

## 2023-09-18 VITALS
RESPIRATION RATE: 16 BRPM | BODY MASS INDEX: 23.45 KG/M2 | SYSTOLIC BLOOD PRESSURE: 126 MMHG | OXYGEN SATURATION: 97 % | HEART RATE: 78 BPM | DIASTOLIC BLOOD PRESSURE: 78 MMHG | HEIGHT: 70 IN | TEMPERATURE: 96.8 F | WEIGHT: 163.8 LBS

## 2023-09-18 DIAGNOSIS — C61 PROSTATE CANCER (HCC): ICD-10-CM

## 2023-09-18 DIAGNOSIS — K21.9 GASTROESOPHAGEAL REFLUX DISEASE WITHOUT ESOPHAGITIS: ICD-10-CM

## 2023-09-18 DIAGNOSIS — Z23 NEEDS FLU SHOT: ICD-10-CM

## 2023-09-18 DIAGNOSIS — F11.99 OPIOID USE, UNSPECIFIED WITH UNSPECIFIED OPIOID-INDUCED DISORDER (HCC): Primary | ICD-10-CM

## 2023-09-18 DIAGNOSIS — R73.01 IFG (IMPAIRED FASTING GLUCOSE): ICD-10-CM

## 2023-09-18 DIAGNOSIS — I77.810 DILATED AORTIC ROOT (HCC): ICD-10-CM

## 2023-09-18 DIAGNOSIS — N18.31 STAGE 3A CHRONIC KIDNEY DISEASE (HCC): ICD-10-CM

## 2023-09-18 DIAGNOSIS — E78.2 MIXED HYPERLIPIDEMIA: ICD-10-CM

## 2023-09-18 DIAGNOSIS — M15.9 PRIMARY OSTEOARTHRITIS INVOLVING MULTIPLE JOINTS: ICD-10-CM

## 2023-09-18 DIAGNOSIS — I10 PRIMARY HYPERTENSION: ICD-10-CM

## 2023-09-18 PROCEDURE — 90471 IMMUNIZATION ADMIN: CPT | Performed by: INTERNAL MEDICINE

## 2023-09-18 PROCEDURE — 99214 OFFICE O/P EST MOD 30 MIN: CPT | Performed by: INTERNAL MEDICINE

## 2023-09-18 PROCEDURE — 1123F ACP DISCUSS/DSCN MKR DOCD: CPT | Performed by: INTERNAL MEDICINE

## 2023-09-18 PROCEDURE — G0008 ADMIN INFLUENZA VIRUS VAC: HCPCS | Performed by: INTERNAL MEDICINE

## 2023-09-18 PROCEDURE — 90694 VACC AIIV4 NO PRSRV 0.5ML IM: CPT | Performed by: INTERNAL MEDICINE

## 2023-09-18 ASSESSMENT — PATIENT HEALTH QUESTIONNAIRE - PHQ9
SUM OF ALL RESPONSES TO PHQ QUESTIONS 1-9: 0
SUM OF ALL RESPONSES TO PHQ QUESTIONS 1-9: 0
1. LITTLE INTEREST OR PLEASURE IN DOING THINGS: 0
SUM OF ALL RESPONSES TO PHQ9 QUESTIONS 1 & 2: 0
SUM OF ALL RESPONSES TO PHQ QUESTIONS 1-9: 0
SUM OF ALL RESPONSES TO PHQ QUESTIONS 1-9: 0
2. FEELING DOWN, DEPRESSED OR HOPELESS: 0

## 2023-09-19 LAB
ALBUMIN SERPL-MCNC: 4 G/DL (ref 3.5–5)
ALBUMIN/GLOB SERPL: 1.3 (ref 1.1–2.2)
ALP SERPL-CCNC: 89 U/L (ref 45–117)
ALT SERPL-CCNC: 22 U/L (ref 12–78)
ANION GAP SERPL CALC-SCNC: 4 MMOL/L (ref 5–15)
AST SERPL-CCNC: 16 U/L (ref 15–37)
BILIRUB SERPL-MCNC: 0.7 MG/DL (ref 0.2–1)
BUN SERPL-MCNC: 30 MG/DL (ref 6–20)
BUN/CREAT SERPL: 16 (ref 12–20)
CALCIUM SERPL-MCNC: 9.3 MG/DL (ref 8.5–10.1)
CHLORIDE SERPL-SCNC: 109 MMOL/L (ref 97–108)
CHOLEST SERPL-MCNC: 183 MG/DL
CO2 SERPL-SCNC: 27 MMOL/L (ref 21–32)
CREAT SERPL-MCNC: 1.84 MG/DL (ref 0.7–1.3)
ERYTHROCYTE [DISTWIDTH] IN BLOOD BY AUTOMATED COUNT: 14.3 % (ref 11.5–14.5)
EST. AVERAGE GLUCOSE BLD GHB EST-MCNC: 114 MG/DL
GLOBULIN SER CALC-MCNC: 3.2 G/DL (ref 2–4)
GLUCOSE SERPL-MCNC: 115 MG/DL (ref 65–100)
HBA1C MFR BLD: 5.6 % (ref 4–5.6)
HCT VFR BLD AUTO: 42.5 % (ref 36.6–50.3)
HDLC SERPL-MCNC: 49 MG/DL
HDLC SERPL: 3.7 (ref 0–5)
HGB BLD-MCNC: 13.9 G/DL (ref 12.1–17)
LDLC SERPL CALC-MCNC: 104 MG/DL (ref 0–100)
MCH RBC QN AUTO: 31.4 PG (ref 26–34)
MCHC RBC AUTO-ENTMCNC: 32.7 G/DL (ref 30–36.5)
MCV RBC AUTO: 96.2 FL (ref 80–99)
NRBC # BLD: 0 K/UL (ref 0–0.01)
NRBC BLD-RTO: 0 PER 100 WBC
PLATELET # BLD AUTO: 167 K/UL (ref 150–400)
PMV BLD AUTO: 12.2 FL (ref 8.9–12.9)
POTASSIUM SERPL-SCNC: 4.8 MMOL/L (ref 3.5–5.1)
PROT SERPL-MCNC: 7.2 G/DL (ref 6.4–8.2)
RBC # BLD AUTO: 4.42 M/UL (ref 4.1–5.7)
SODIUM SERPL-SCNC: 140 MMOL/L (ref 136–145)
TRIGL SERPL-MCNC: 150 MG/DL
TSH SERPL DL<=0.05 MIU/L-ACNC: 0.6 UIU/ML (ref 0.36–3.74)
VLDLC SERPL CALC-MCNC: 30 MG/DL
WBC # BLD AUTO: 7.7 K/UL (ref 4.1–11.1)

## 2023-09-20 DIAGNOSIS — M15.9 PRIMARY OSTEOARTHRITIS INVOLVING MULTIPLE JOINTS: Primary | ICD-10-CM

## 2023-09-23 LAB — DRUGS UR: NORMAL

## 2023-09-23 RX ORDER — TRAMADOL HYDROCHLORIDE 50 MG/1
TABLET ORAL
Qty: 90 TABLET | Refills: 0 | Status: SHIPPED | OUTPATIENT
Start: 2023-09-23 | End: 2023-10-23

## 2023-10-31 DIAGNOSIS — M15.9 PRIMARY OSTEOARTHRITIS INVOLVING MULTIPLE JOINTS: Primary | ICD-10-CM

## 2023-11-01 RX ORDER — TRAMADOL HYDROCHLORIDE 50 MG/1
TABLET ORAL
Qty: 90 TABLET | Refills: 0 | Status: SHIPPED | OUTPATIENT
Start: 2023-11-01 | End: 2023-12-01

## 2023-11-14 RX ORDER — LEVOCETIRIZINE DIHYDROCHLORIDE 5 MG/1
TABLET, FILM COATED ORAL
Qty: 90 TABLET | Refills: 0 | Status: SHIPPED | OUTPATIENT
Start: 2023-11-14

## 2023-12-12 DIAGNOSIS — M15.9 PRIMARY OSTEOARTHRITIS INVOLVING MULTIPLE JOINTS: ICD-10-CM

## 2023-12-12 RX ORDER — TRAMADOL HYDROCHLORIDE 50 MG/1
TABLET ORAL
Qty: 90 TABLET | Refills: 0 | Status: SHIPPED | OUTPATIENT
Start: 2023-12-12 | End: 2024-01-11

## 2024-01-15 NOTE — PROGRESS NOTES
HISTORY OF PRESENT ILLNESS  Esvin Ferreira is a 88 y.o. male.  HPI    Last here 9/18/23. Pt is here to f/u on chronic conditions.      Has a history of hypertension   BP today is 129/76  No home BP readings for review  He is taking coreg 6.25 mg BID for cardiomyopathy - he took this today   No longer on lisinopril 5mg daily to protect his kidneys d/t high k      Wt today is 169 lbs,  up 6 lbs since lov weight currently stable      Recall a total of over 30 pounds in the last couple of years--this visit weight is stable and increased  Recall last EGD 2016   Previously discussed an EGD would be a good idea due to weight loss and hx of stomach upset.  Discussed getting a CT of the abdomen for further evaluation as well  Recall discussed at length my recommendation is to see Dr. High (GI) to rule out GI cancer, gave his information.   Discussed options for evaluation he is not interested in any evaluation he states he will think about it, I have discussed that if we do not evaluate this further we do potentially miss diagnosis of cancer he expressed understanding he is not interested in further evaluation at this time.  -- Weight has stabilized today  Recall states he is now only having one cup of coffee as opposed to two, this has helped his stomach significantly.  He states he did change this in December  Discussed the above again today and that I still recommend he see Dr. High and that he should have a CT abd. He has the information to call if he changes his mind.  Ultimately he is not really planning on any additional follow up     Reviewed labs   Ordered labs     Previously pt c/o R knee pain and buckling and arthritis has not seen Ortho since     Previously, I referred the pt to Dr. Yaa Mcgee (rheum) for history of osteoarthritis he has not decided to go his hands are bothering him little bit more but he is not interested in any additional intervention     Pt follows with Dr. Stoddard (cardio) for

## 2024-01-18 DIAGNOSIS — M15.9 PRIMARY OSTEOARTHRITIS INVOLVING MULTIPLE JOINTS: ICD-10-CM

## 2024-01-19 RX ORDER — TRAMADOL HYDROCHLORIDE 50 MG/1
TABLET ORAL
Qty: 90 TABLET | Refills: 0 | Status: SHIPPED | OUTPATIENT
Start: 2024-01-19 | End: 2024-02-18

## 2024-01-20 NOTE — PROGRESS NOTES
Medicare Annual Wellness Visit    Esvin Ferreira is here for Medicare AWV    Assessment & Plan   Primary hypertension  -     Hemoglobin A1C; Future  -     Basic Metabolic Panel; Future  Medicare annual wellness visit, subsequent  Dilated aortic root (HCC)  IFG (impaired fasting glucose)  -     Hemoglobin A1C; Future  -     Basic Metabolic Panel; Future  Gastroesophageal reflux disease without esophagitis  Prostate cancer (HCC)  Stage 3a chronic kidney disease (HCC)  Opioid use, unspecified with unspecified opioid-induced disorder (HCC)  Benign prostatic hyperplasia without lower urinary tract symptoms  Mixed hyperlipidemia  Primary osteoarthritis involving multiple joints  Cardiomyopathy, unspecified type (HCC)     Recommendations for Preventive Services Due: see orders and patient instructions/AVS.  Recommended screening schedule for the next 5-10 years is provided to the patient in written form: see Patient Instructions/AVS.     No follow-ups on file.     Subjective       Patient's complete Health Risk Assessment and screening values have been reviewed and are found in Flowsheets. The following problems were reviewed today and where indicated follow up appointments were made and/or referrals ordered.    Positive Risk Factor Screenings with Interventions:            Controlled Medication Review:      Today's Pain Level: Pain Score: Zero     Opioid Risk: (Low risk score <55) Opioid risk score: 26    Patient is low risk for opioid use disorder or overdose.    Last PDMP [place] as Reviewed:  Review User Review Instant Review Result                  Dentist Screen:  Have you seen the dentist within the past year?: (!) No  Intervention:  Advised to schedule with their dentist       ADL's:   Patient reports needing help with:  Select all that apply: (!) Housekeeping  Interventions:  Hires help as needed              Objective   Vitals:    01/22/24 1303 01/22/24 1328 01/22/24 1329 01/22/24 1338   BP: (!) 151/74 129/76 (!)  24-Mar-2020 18:53

## 2024-01-22 ENCOUNTER — OFFICE VISIT (OUTPATIENT)
Age: 89
End: 2024-01-22
Payer: MEDICARE

## 2024-01-22 VITALS
SYSTOLIC BLOOD PRESSURE: 129 MMHG | TEMPERATURE: 98 F | DIASTOLIC BLOOD PRESSURE: 76 MMHG | RESPIRATION RATE: 18 BRPM | WEIGHT: 169 LBS | OXYGEN SATURATION: 100 % | HEIGHT: 70 IN | BODY MASS INDEX: 24.2 KG/M2 | HEART RATE: 68 BPM

## 2024-01-22 DIAGNOSIS — R73.01 IFG (IMPAIRED FASTING GLUCOSE): ICD-10-CM

## 2024-01-22 DIAGNOSIS — N18.31 STAGE 3A CHRONIC KIDNEY DISEASE (HCC): ICD-10-CM

## 2024-01-22 DIAGNOSIS — N40.0 BENIGN PROSTATIC HYPERPLASIA WITHOUT LOWER URINARY TRACT SYMPTOMS: ICD-10-CM

## 2024-01-22 DIAGNOSIS — F11.99 OPIOID USE, UNSPECIFIED WITH UNSPECIFIED OPIOID-INDUCED DISORDER (HCC): ICD-10-CM

## 2024-01-22 DIAGNOSIS — E78.2 MIXED HYPERLIPIDEMIA: ICD-10-CM

## 2024-01-22 DIAGNOSIS — K21.9 GASTROESOPHAGEAL REFLUX DISEASE WITHOUT ESOPHAGITIS: ICD-10-CM

## 2024-01-22 DIAGNOSIS — I42.9 CARDIOMYOPATHY, UNSPECIFIED TYPE (HCC): ICD-10-CM

## 2024-01-22 DIAGNOSIS — C61 PROSTATE CANCER (HCC): ICD-10-CM

## 2024-01-22 DIAGNOSIS — I10 PRIMARY HYPERTENSION: Primary | ICD-10-CM

## 2024-01-22 DIAGNOSIS — Z00.00 MEDICARE ANNUAL WELLNESS VISIT, SUBSEQUENT: ICD-10-CM

## 2024-01-22 DIAGNOSIS — I77.810 DILATED AORTIC ROOT (HCC): ICD-10-CM

## 2024-01-22 DIAGNOSIS — M15.9 PRIMARY OSTEOARTHRITIS INVOLVING MULTIPLE JOINTS: ICD-10-CM

## 2024-01-22 PROCEDURE — 99214 OFFICE O/P EST MOD 30 MIN: CPT | Performed by: INTERNAL MEDICINE

## 2024-01-22 PROCEDURE — G0439 PPPS, SUBSEQ VISIT: HCPCS | Performed by: INTERNAL MEDICINE

## 2024-01-22 PROCEDURE — 1123F ACP DISCUSS/DSCN MKR DOCD: CPT | Performed by: INTERNAL MEDICINE

## 2024-01-22 ASSESSMENT — PATIENT HEALTH QUESTIONNAIRE - PHQ9
SUM OF ALL RESPONSES TO PHQ QUESTIONS 1-9: 0
1. LITTLE INTEREST OR PLEASURE IN DOING THINGS: 0
2. FEELING DOWN, DEPRESSED OR HOPELESS: 0
SUM OF ALL RESPONSES TO PHQ9 QUESTIONS 1 & 2: 0
SUM OF ALL RESPONSES TO PHQ QUESTIONS 1-9: 0

## 2024-01-22 ASSESSMENT — LIFESTYLE VARIABLES
HOW MANY STANDARD DRINKS CONTAINING ALCOHOL DO YOU HAVE ON A TYPICAL DAY: PATIENT DOES NOT DRINK
HOW OFTEN DO YOU HAVE A DRINK CONTAINING ALCOHOL: NEVER

## 2024-01-22 NOTE — PROGRESS NOTES
\"Have you been to the ER, urgent care clinic since your last visit?  Hospitalized since your last visit?\"    NO    “Have you seen or consulted any other health care providers outside of [facility] since your last visit?”    NO

## 2024-01-23 LAB
ANION GAP SERPL CALC-SCNC: 6 MMOL/L (ref 5–15)
BUN SERPL-MCNC: 35 MG/DL (ref 6–20)
BUN/CREAT SERPL: 20 (ref 12–20)
CALCIUM SERPL-MCNC: 8.8 MG/DL (ref 8.5–10.1)
CHLORIDE SERPL-SCNC: 111 MMOL/L (ref 97–108)
CO2 SERPL-SCNC: 25 MMOL/L (ref 21–32)
CREAT SERPL-MCNC: 1.76 MG/DL (ref 0.7–1.3)
EST. AVERAGE GLUCOSE BLD GHB EST-MCNC: 117 MG/DL
GLUCOSE SERPL-MCNC: 102 MG/DL (ref 65–100)
HBA1C MFR BLD: 5.7 % (ref 4–5.6)
POTASSIUM SERPL-SCNC: 5.2 MMOL/L (ref 3.5–5.1)
SODIUM SERPL-SCNC: 142 MMOL/L (ref 136–145)

## 2024-02-15 RX ORDER — LEVOCETIRIZINE DIHYDROCHLORIDE 5 MG/1
TABLET, FILM COATED ORAL
Qty: 90 TABLET | Refills: 0 | Status: SHIPPED | OUTPATIENT
Start: 2024-02-15

## 2024-02-29 DIAGNOSIS — M15.9 PRIMARY OSTEOARTHRITIS INVOLVING MULTIPLE JOINTS: ICD-10-CM

## 2024-03-02 RX ORDER — TRAMADOL HYDROCHLORIDE 50 MG/1
TABLET ORAL
Qty: 90 TABLET | Refills: 0 | Status: SHIPPED | OUTPATIENT
Start: 2024-03-02 | End: 2024-04-01

## 2024-04-08 DIAGNOSIS — M15.9 PRIMARY OSTEOARTHRITIS INVOLVING MULTIPLE JOINTS: ICD-10-CM

## 2024-04-08 RX ORDER — TRAMADOL HYDROCHLORIDE 50 MG/1
50 TABLET ORAL EVERY 8 HOURS PRN
Qty: 90 TABLET | Refills: 0 | Status: SHIPPED | OUTPATIENT
Start: 2024-04-08 | End: 2024-05-08

## 2024-04-23 ENCOUNTER — OFFICE VISIT (OUTPATIENT)
Age: 89
End: 2024-04-23
Payer: MEDICARE

## 2024-04-23 VITALS
TEMPERATURE: 97.9 F | BODY MASS INDEX: 24.34 KG/M2 | HEART RATE: 80 BPM | DIASTOLIC BLOOD PRESSURE: 79 MMHG | RESPIRATION RATE: 15 BRPM | WEIGHT: 170 LBS | OXYGEN SATURATION: 97 % | SYSTOLIC BLOOD PRESSURE: 136 MMHG | HEIGHT: 70 IN

## 2024-04-23 DIAGNOSIS — F11.99 OPIOID USE, UNSPECIFIED WITH UNSPECIFIED OPIOID-INDUCED DISORDER (HCC): ICD-10-CM

## 2024-04-23 DIAGNOSIS — C61 PROSTATE CANCER (HCC): ICD-10-CM

## 2024-04-23 DIAGNOSIS — J30.89 PERENNIAL ALLERGIC RHINITIS: ICD-10-CM

## 2024-04-23 DIAGNOSIS — M19.041 PRIMARY OSTEOARTHRITIS OF BOTH HANDS: ICD-10-CM

## 2024-04-23 DIAGNOSIS — N18.31 STAGE 3A CHRONIC KIDNEY DISEASE (HCC): ICD-10-CM

## 2024-04-23 DIAGNOSIS — I10 PRIMARY HYPERTENSION: Primary | ICD-10-CM

## 2024-04-23 DIAGNOSIS — I50.22 CHRONIC SYSTOLIC (CONGESTIVE) HEART FAILURE (HCC): ICD-10-CM

## 2024-04-23 DIAGNOSIS — M19.042 PRIMARY OSTEOARTHRITIS OF BOTH HANDS: ICD-10-CM

## 2024-04-23 DIAGNOSIS — Z51.81 ENCOUNTER FOR THERAPEUTIC DRUG LEVEL MONITORING: ICD-10-CM

## 2024-04-23 DIAGNOSIS — R73.01 IFG (IMPAIRED FASTING GLUCOSE): ICD-10-CM

## 2024-04-23 DIAGNOSIS — K21.9 GASTROESOPHAGEAL REFLUX DISEASE WITHOUT ESOPHAGITIS: ICD-10-CM

## 2024-04-23 DIAGNOSIS — E78.2 MIXED HYPERLIPIDEMIA: ICD-10-CM

## 2024-04-23 DIAGNOSIS — I77.810 DILATED AORTIC ROOT (HCC): ICD-10-CM

## 2024-04-23 PROCEDURE — 99214 OFFICE O/P EST MOD 30 MIN: CPT | Performed by: INTERNAL MEDICINE

## 2024-04-23 PROCEDURE — 1123F ACP DISCUSS/DSCN MKR DOCD: CPT | Performed by: INTERNAL MEDICINE

## 2024-04-23 NOTE — PROGRESS NOTES
\"Have you been to the ER, urgent care clinic since your last visit?  Hospitalized since your last visit?\"    NO    “Have you seen or consulted any other health care providers outside of [facility] since your last visit?”    NO            Click Here for Release of Records Request  
The services provided are my own.  The documentation is accurate   Depression screen reviewed and negative.  Scribed by Daphnie Christian of [facility], as dictated by Dr. Tete Cody.    Current diagnosis and concerns discussed with pt at length. Pt understands risks and benefits or current treatment plan and medications, and accepts the treatment and medication with any possible risks. Pt asks appropriate questions, which were answered. Pt was instructed to call with any concerns or problems.    I have reviewed the note documented by the scribe. The services provided are my own. The documentation is accurate.

## 2024-04-30 LAB — DRUGS UR: NORMAL

## 2024-05-16 DIAGNOSIS — M15.9 PRIMARY OSTEOARTHRITIS INVOLVING MULTIPLE JOINTS: ICD-10-CM

## 2024-05-16 RX ORDER — TRAMADOL HYDROCHLORIDE 50 MG/1
50 TABLET ORAL EVERY 8 HOURS PRN
Qty: 90 TABLET | Refills: 0 | Status: SHIPPED | OUTPATIENT
Start: 2024-05-16 | End: 2024-06-15

## 2024-05-16 RX ORDER — LEVOCETIRIZINE DIHYDROCHLORIDE 5 MG/1
5 TABLET, FILM COATED ORAL NIGHTLY
Qty: 90 TABLET | Refills: 0 | Status: SHIPPED | OUTPATIENT
Start: 2024-05-16

## 2024-06-27 DIAGNOSIS — M15.9 PRIMARY OSTEOARTHRITIS INVOLVING MULTIPLE JOINTS: ICD-10-CM

## 2024-06-27 RX ORDER — TRAMADOL HYDROCHLORIDE 50 MG/1
50 TABLET ORAL EVERY 8 HOURS PRN
Qty: 90 TABLET | Refills: 0 | Status: SHIPPED | OUTPATIENT
Start: 2024-06-27 | End: 2024-07-27

## 2024-07-23 ENCOUNTER — LAB (OUTPATIENT)
Age: 89
End: 2024-07-23

## 2024-07-23 DIAGNOSIS — N18.31 STAGE 3A CHRONIC KIDNEY DISEASE (HCC): ICD-10-CM

## 2024-07-23 DIAGNOSIS — R73.01 IFG (IMPAIRED FASTING GLUCOSE): ICD-10-CM

## 2024-07-23 DIAGNOSIS — I50.22 CHRONIC SYSTOLIC (CONGESTIVE) HEART FAILURE (HCC): ICD-10-CM

## 2024-07-23 DIAGNOSIS — E78.2 MIXED HYPERLIPIDEMIA: ICD-10-CM

## 2024-07-24 LAB
ALBUMIN SERPL-MCNC: 3.9 G/DL (ref 3.5–5)
ALBUMIN/GLOB SERPL: 1.3 (ref 1.1–2.2)
ALP SERPL-CCNC: 95 U/L (ref 45–117)
ALT SERPL-CCNC: 19 U/L (ref 12–78)
ANION GAP SERPL CALC-SCNC: 5 MMOL/L (ref 5–15)
AST SERPL-CCNC: 17 U/L (ref 15–37)
BILIRUB SERPL-MCNC: 0.9 MG/DL (ref 0.2–1)
BUN SERPL-MCNC: 29 MG/DL (ref 6–20)
BUN/CREAT SERPL: 17 (ref 12–20)
CALCIUM SERPL-MCNC: 9.3 MG/DL (ref 8.5–10.1)
CHLORIDE SERPL-SCNC: 110 MMOL/L (ref 97–108)
CHOLEST SERPL-MCNC: 206 MG/DL
CO2 SERPL-SCNC: 25 MMOL/L (ref 21–32)
CREAT SERPL-MCNC: 1.75 MG/DL (ref 0.7–1.3)
EST. AVERAGE GLUCOSE BLD GHB EST-MCNC: 114 MG/DL
GLOBULIN SER CALC-MCNC: 3.1 G/DL (ref 2–4)
GLUCOSE SERPL-MCNC: 107 MG/DL (ref 65–100)
HBA1C MFR BLD: 5.6 % (ref 4–5.6)
HDLC SERPL-MCNC: 46 MG/DL
HDLC SERPL: 4.5 (ref 0–5)
LDLC SERPL CALC-MCNC: 139.8 MG/DL (ref 0–100)
POTASSIUM SERPL-SCNC: 5 MMOL/L (ref 3.5–5.1)
PROT SERPL-MCNC: 7 G/DL (ref 6.4–8.2)
SODIUM SERPL-SCNC: 140 MMOL/L (ref 136–145)
T4 FREE SERPL-MCNC: 1.1 NG/DL (ref 0.8–1.5)
TRIGL SERPL-MCNC: 101 MG/DL
TSH SERPL DL<=0.05 MIU/L-ACNC: 0.43 UIU/ML (ref 0.36–3.74)
VLDLC SERPL CALC-MCNC: 20.2 MG/DL

## 2024-08-02 DIAGNOSIS — M15.9 PRIMARY OSTEOARTHRITIS INVOLVING MULTIPLE JOINTS: ICD-10-CM

## 2024-08-02 RX ORDER — TRAMADOL HYDROCHLORIDE 50 MG/1
50 TABLET ORAL EVERY 8 HOURS PRN
Qty: 90 TABLET | Refills: 0 | Status: SHIPPED | OUTPATIENT
Start: 2024-08-02 | End: 2024-09-01

## 2024-08-06 ASSESSMENT — ENCOUNTER SYMPTOMS: SHORTNESS OF BREATH: 0

## 2024-08-07 ENCOUNTER — OFFICE VISIT (OUTPATIENT)
Age: 89
End: 2024-08-07
Payer: MEDICARE

## 2024-08-07 VITALS
RESPIRATION RATE: 18 BRPM | SYSTOLIC BLOOD PRESSURE: 126 MMHG | BODY MASS INDEX: 23.62 KG/M2 | WEIGHT: 165 LBS | DIASTOLIC BLOOD PRESSURE: 75 MMHG | HEART RATE: 97 BPM | HEIGHT: 70 IN | TEMPERATURE: 98 F | OXYGEN SATURATION: 98 %

## 2024-08-07 DIAGNOSIS — R63.4 WEIGHT LOSS: ICD-10-CM

## 2024-08-07 DIAGNOSIS — C61 PROSTATE CANCER (HCC): ICD-10-CM

## 2024-08-07 DIAGNOSIS — I10 PRIMARY HYPERTENSION: Primary | ICD-10-CM

## 2024-08-07 DIAGNOSIS — E78.2 MIXED HYPERLIPIDEMIA: ICD-10-CM

## 2024-08-07 DIAGNOSIS — J30.89 PERENNIAL ALLERGIC RHINITIS: ICD-10-CM

## 2024-08-07 DIAGNOSIS — R73.01 IFG (IMPAIRED FASTING GLUCOSE): ICD-10-CM

## 2024-08-07 DIAGNOSIS — I50.22 CHRONIC SYSTOLIC (CONGESTIVE) HEART FAILURE (HCC): ICD-10-CM

## 2024-08-07 DIAGNOSIS — I77.810 DILATED AORTIC ROOT (HCC): ICD-10-CM

## 2024-08-07 DIAGNOSIS — N18.31 STAGE 3A CHRONIC KIDNEY DISEASE (HCC): ICD-10-CM

## 2024-08-07 DIAGNOSIS — M15.9 PRIMARY OSTEOARTHRITIS INVOLVING MULTIPLE JOINTS: ICD-10-CM

## 2024-08-07 PROCEDURE — 99214 OFFICE O/P EST MOD 30 MIN: CPT | Performed by: INTERNAL MEDICINE

## 2024-08-07 PROCEDURE — 1123F ACP DISCUSS/DSCN MKR DOCD: CPT | Performed by: INTERNAL MEDICINE

## 2024-08-07 SDOH — ECONOMIC STABILITY: FOOD INSECURITY: WITHIN THE PAST 12 MONTHS, YOU WORRIED THAT YOUR FOOD WOULD RUN OUT BEFORE YOU GOT MONEY TO BUY MORE.: NEVER TRUE

## 2024-08-07 SDOH — ECONOMIC STABILITY: HOUSING INSECURITY
IN THE LAST 12 MONTHS, WAS THERE A TIME WHEN YOU DID NOT HAVE A STEADY PLACE TO SLEEP OR SLEPT IN A SHELTER (INCLUDING NOW)?: NO

## 2024-08-07 SDOH — ECONOMIC STABILITY: FOOD INSECURITY: WITHIN THE PAST 12 MONTHS, THE FOOD YOU BOUGHT JUST DIDN'T LAST AND YOU DIDN'T HAVE MONEY TO GET MORE.: NEVER TRUE

## 2024-08-07 SDOH — ECONOMIC STABILITY: INCOME INSECURITY: HOW HARD IS IT FOR YOU TO PAY FOR THE VERY BASICS LIKE FOOD, HOUSING, MEDICAL CARE, AND HEATING?: NOT HARD AT ALL

## 2024-08-07 ASSESSMENT — PATIENT HEALTH QUESTIONNAIRE - PHQ9
SUM OF ALL RESPONSES TO PHQ QUESTIONS 1-9: 0
2. FEELING DOWN, DEPRESSED OR HOPELESS: NOT AT ALL
SUM OF ALL RESPONSES TO PHQ QUESTIONS 1-9: 0
1. LITTLE INTEREST OR PLEASURE IN DOING THINGS: NOT AT ALL
SUM OF ALL RESPONSES TO PHQ9 QUESTIONS 1 & 2: 0

## 2024-08-07 NOTE — PROGRESS NOTES
\"Have you been to the ER, urgent care clinic since your last visit?  Hospitalized since your last visit?\"    NO    “Have you seen or consulted any other health care providers outside of [facility] since your last visit?”    NO            Click Here for Release of Records Request    
concerns discussed with pt at length. Pt understands risks and benefits or current treatment plan and medications, and accepts the treatment and medication with any possible risks. Pt asks appropriate questions, which were answered. Pt was instructed to call with any concerns or problems.    I have reviewed the note documented by the scribe. The services provided are my own. The documentation is accurate.  
documented by the scribe. The services provided are my own. The documentation is accurate.

## 2024-08-15 RX ORDER — LEVOCETIRIZINE DIHYDROCHLORIDE 5 MG/1
5 TABLET, FILM COATED ORAL NIGHTLY
Qty: 90 TABLET | Refills: 0 | Status: SHIPPED | OUTPATIENT
Start: 2024-08-15

## 2024-09-24 DIAGNOSIS — M15.9 PRIMARY OSTEOARTHRITIS INVOLVING MULTIPLE JOINTS: Primary | ICD-10-CM

## 2024-09-24 RX ORDER — TRAMADOL HYDROCHLORIDE 50 MG/1
50 TABLET ORAL EVERY 8 HOURS PRN
Qty: 90 TABLET | Refills: 0 | Status: SHIPPED | OUTPATIENT
Start: 2024-09-24 | End: 2024-10-24

## 2024-10-29 DIAGNOSIS — M15.0 PRIMARY OSTEOARTHRITIS INVOLVING MULTIPLE JOINTS: ICD-10-CM

## 2024-10-29 RX ORDER — TRAMADOL HYDROCHLORIDE 50 MG/1
50 TABLET ORAL EVERY 8 HOURS PRN
Qty: 90 TABLET | Refills: 0 | Status: SHIPPED | OUTPATIENT
Start: 2024-10-29 | End: 2024-11-28

## 2024-11-19 RX ORDER — LEVOCETIRIZINE DIHYDROCHLORIDE 5 MG/1
5 TABLET, FILM COATED ORAL NIGHTLY
Qty: 90 TABLET | Refills: 0 | Status: SHIPPED | OUTPATIENT
Start: 2024-11-19

## 2024-11-26 NOTE — PROGRESS NOTES
HISTORY OF PRESENT ILLNESS  Esvin Ferreira is a 89 y.o. male.  HPI  Last here 8/7/24. Pt is here to f/u on chronic conditions.   Ambulates with cane     Has a history of hypertension   BP today is 100/69  No home BP readings for review  He is taking coreg 6.25 mg BID for cardiomyopathy - he took this today he did feel like it makes him fatigued but it is tolerable  No longer on lisinopril 5mg daily to protect his kidneys d/t high k      Wt today is 165 lbs, stable since lov       Recall a total of over 30 pounds in the last couple of years but was previously 168 back in 2019 reviewed his weights he had gained weight in 2021 and 2020 his max was 180 pounds in 2021 previously in 2019 his weight was 169     He is not interested in any workup for weight loss currently weight is stable         Recall last EGD 2016   Previously discussed an EGD would be a good idea due to weight loss and hx of stomach upset. Discussed getting a CT of the abdomen for further evaluation as well  Recall discussed at length my recommendation is to see Dr. High (GI) to rule out GI cancer, gave his information.   Discussed options for evaluation he is not interested in any evaluation he states he will think about it, I have discussed that if we do not evaluate this further we do potentially miss diagnosis of cancer he expressed understanding he is not interested in further evaluation at this time.     Recall states he is now only having one cup of coffee as opposed to two, this has helped his stomach significantly.  He states he did change this in December  Discussed the above again today and that I still recommend he see Dr. High and that he should have a CT abd. He has the information to call if he changes his mind. Ultimately he is not really planning on any additional follow up           Reviewed labs   Ordered pre-labs     Previously pt c/o left knee pain and buckling and arthritis has not seen Ortho since continues to complain

## 2024-12-03 ENCOUNTER — OFFICE VISIT (OUTPATIENT)
Age: 88
End: 2024-12-03
Payer: MEDICARE

## 2024-12-03 VITALS
TEMPERATURE: 98.1 F | SYSTOLIC BLOOD PRESSURE: 100 MMHG | HEART RATE: 96 BPM | RESPIRATION RATE: 15 BRPM | BODY MASS INDEX: 23.68 KG/M2 | DIASTOLIC BLOOD PRESSURE: 69 MMHG | HEIGHT: 70 IN | OXYGEN SATURATION: 98 %

## 2024-12-03 DIAGNOSIS — N18.31 STAGE 3A CHRONIC KIDNEY DISEASE (HCC): ICD-10-CM

## 2024-12-03 DIAGNOSIS — R73.01 IFG (IMPAIRED FASTING GLUCOSE): ICD-10-CM

## 2024-12-03 DIAGNOSIS — E78.2 MIXED HYPERLIPIDEMIA: ICD-10-CM

## 2024-12-03 DIAGNOSIS — M19.041 PRIMARY OSTEOARTHRITIS OF BOTH HANDS: ICD-10-CM

## 2024-12-03 DIAGNOSIS — I10 PRIMARY HYPERTENSION: Primary | ICD-10-CM

## 2024-12-03 DIAGNOSIS — I77.810 DILATED AORTIC ROOT (HCC): ICD-10-CM

## 2024-12-03 DIAGNOSIS — F11.99 OPIOID USE, UNSPECIFIED WITH UNSPECIFIED OPIOID-INDUCED DISORDER (HCC): ICD-10-CM

## 2024-12-03 DIAGNOSIS — M19.042 PRIMARY OSTEOARTHRITIS OF BOTH HANDS: ICD-10-CM

## 2024-12-03 DIAGNOSIS — Z85.46 H/O PROSTATE CANCER: ICD-10-CM

## 2024-12-03 DIAGNOSIS — M19.042 PRIMARY OSTEOARTHRITIS OF BOTH HANDS: Primary | ICD-10-CM

## 2024-12-03 DIAGNOSIS — M19.041 PRIMARY OSTEOARTHRITIS OF BOTH HANDS: Primary | ICD-10-CM

## 2024-12-03 DIAGNOSIS — I50.22 CHRONIC SYSTOLIC (CONGESTIVE) HEART FAILURE (HCC): ICD-10-CM

## 2024-12-03 DIAGNOSIS — Z51.81 ENCOUNTER FOR THERAPEUTIC DRUG LEVEL MONITORING: ICD-10-CM

## 2024-12-03 DIAGNOSIS — K59.03 DRUG-INDUCED CONSTIPATION: ICD-10-CM

## 2024-12-03 PROCEDURE — 1126F AMNT PAIN NOTED NONE PRSNT: CPT | Performed by: INTERNAL MEDICINE

## 2024-12-03 PROCEDURE — 1160F RVW MEDS BY RX/DR IN RCRD: CPT | Performed by: INTERNAL MEDICINE

## 2024-12-03 PROCEDURE — 99214 OFFICE O/P EST MOD 30 MIN: CPT | Performed by: INTERNAL MEDICINE

## 2024-12-03 PROCEDURE — 1123F ACP DISCUSS/DSCN MKR DOCD: CPT | Performed by: INTERNAL MEDICINE

## 2024-12-03 PROCEDURE — 1159F MED LIST DOCD IN RCRD: CPT | Performed by: INTERNAL MEDICINE

## 2024-12-03 RX ORDER — TRAMADOL HYDROCHLORIDE 50 MG/1
50 TABLET ORAL EVERY 8 HOURS PRN
Qty: 90 TABLET | Refills: 0 | Status: SHIPPED | OUTPATIENT
Start: 2024-12-03 | End: 2025-03-03

## 2024-12-03 ASSESSMENT — PATIENT HEALTH QUESTIONNAIRE - PHQ9
SUM OF ALL RESPONSES TO PHQ QUESTIONS 1-9: 0
SUM OF ALL RESPONSES TO PHQ QUESTIONS 1-9: 0
1. LITTLE INTEREST OR PLEASURE IN DOING THINGS: NOT AT ALL
SUM OF ALL RESPONSES TO PHQ9 QUESTIONS 1 & 2: 0
SUM OF ALL RESPONSES TO PHQ QUESTIONS 1-9: 0
2. FEELING DOWN, DEPRESSED OR HOPELESS: NOT AT ALL
SUM OF ALL RESPONSES TO PHQ QUESTIONS 1-9: 0

## 2024-12-03 NOTE — TELEPHONE ENCOUNTER
Future Appointments:  No future appointments.     Last Appointment With Me:  12/3/2024     Requested Prescriptions     Pending Prescriptions Disp Refills    traMADol (ULTRAM) 50 MG tablet 270 tablet 0     Sig: Take 1 tablet by mouth every 8 hours as needed for Pain for up to 90 days. Intended supply: 7 days. Take lowest dose possible to manage pain Max Daily Amount: 150 mg

## 2024-12-07 LAB — DRUGS UR: NORMAL

## 2025-01-24 DIAGNOSIS — M19.042 PRIMARY OSTEOARTHRITIS OF BOTH HANDS: ICD-10-CM

## 2025-01-24 DIAGNOSIS — M19.041 PRIMARY OSTEOARTHRITIS OF BOTH HANDS: ICD-10-CM

## 2025-01-26 RX ORDER — TRAMADOL HYDROCHLORIDE 50 MG/1
50 TABLET ORAL EVERY 8 HOURS PRN
Qty: 90 TABLET | Refills: 0 | Status: SHIPPED | OUTPATIENT
Start: 2025-01-26 | End: 2025-02-25

## 2025-02-27 DIAGNOSIS — M19.042 PRIMARY OSTEOARTHRITIS OF BOTH HANDS: ICD-10-CM

## 2025-02-27 DIAGNOSIS — M19.041 PRIMARY OSTEOARTHRITIS OF BOTH HANDS: ICD-10-CM

## 2025-02-27 RX ORDER — TRAMADOL HYDROCHLORIDE 50 MG/1
50 TABLET ORAL EVERY 8 HOURS PRN
Qty: 90 TABLET | Refills: 0 | Status: SHIPPED | OUTPATIENT
Start: 2025-02-27 | End: 2025-03-29

## 2025-02-27 RX ORDER — LEVOCETIRIZINE DIHYDROCHLORIDE 5 MG/1
5 TABLET, FILM COATED ORAL NIGHTLY
Qty: 90 TABLET | Refills: 0 | Status: SHIPPED | OUTPATIENT
Start: 2025-02-27

## 2025-03-25 ENCOUNTER — LAB (OUTPATIENT)
Age: 89
End: 2025-03-25

## 2025-03-25 DIAGNOSIS — I10 PRIMARY HYPERTENSION: ICD-10-CM

## 2025-03-25 DIAGNOSIS — N18.31 STAGE 3A CHRONIC KIDNEY DISEASE (HCC): ICD-10-CM

## 2025-03-25 DIAGNOSIS — R73.01 IFG (IMPAIRED FASTING GLUCOSE): ICD-10-CM

## 2025-03-25 DIAGNOSIS — E78.2 MIXED HYPERLIPIDEMIA: ICD-10-CM

## 2025-03-25 LAB
ANION GAP SERPL CALC-SCNC: 6 MMOL/L (ref 2–12)
BUN SERPL-MCNC: 25 MG/DL (ref 6–20)
BUN/CREAT SERPL: 13 (ref 12–20)
CALCIUM SERPL-MCNC: 9 MG/DL (ref 8.5–10.1)
CHLORIDE SERPL-SCNC: 108 MMOL/L (ref 97–108)
CO2 SERPL-SCNC: 26 MMOL/L (ref 21–32)
CREAT SERPL-MCNC: 1.96 MG/DL (ref 0.7–1.3)
ERYTHROCYTE [DISTWIDTH] IN BLOOD BY AUTOMATED COUNT: 14.2 % (ref 11.5–14.5)
EST. AVERAGE GLUCOSE BLD GHB EST-MCNC: 108 MG/DL
GLUCOSE SERPL-MCNC: 104 MG/DL (ref 65–100)
HBA1C MFR BLD: 5.4 % (ref 4–5.6)
HCT VFR BLD AUTO: 42.2 % (ref 36.6–50.3)
HGB BLD-MCNC: 13.3 G/DL (ref 12.1–17)
MCH RBC QN AUTO: 30.9 PG (ref 26–34)
MCHC RBC AUTO-ENTMCNC: 31.5 G/DL (ref 30–36.5)
MCV RBC AUTO: 98.1 FL (ref 80–99)
NRBC # BLD: 0 K/UL (ref 0–0.01)
NRBC BLD-RTO: 0 PER 100 WBC
PLATELET # BLD AUTO: 173 K/UL (ref 150–400)
PMV BLD AUTO: 12.7 FL (ref 8.9–12.9)
POTASSIUM SERPL-SCNC: 4.8 MMOL/L (ref 3.5–5.1)
RBC # BLD AUTO: 4.3 M/UL (ref 4.1–5.7)
SODIUM SERPL-SCNC: 140 MMOL/L (ref 136–145)
WBC # BLD AUTO: 6.8 K/UL (ref 4.1–11.1)

## 2025-03-26 ENCOUNTER — RESULTS FOLLOW-UP (OUTPATIENT)
Age: 89
End: 2025-03-26

## 2025-04-04 NOTE — PATIENT INSTRUCTIONS
attack. These may include:    Chest pain or pressure, or a strange feeling in the chest.     Sweating.     Shortness of breath.     Pain, pressure, or a strange feeling in the back, neck, jaw, or upper belly or in one or both shoulders or arms.     Lightheadedness or sudden weakness.     A fast or irregular heartbeat.   After you call 911, the  may tell you to chew 1 adult-strength or 2 to 4 low-dose aspirin. Wait for an ambulance. Do not try to drive yourself.  Watch closely for changes in your health, and be sure to contact your doctor if you have any problems.  Where can you learn more?  Go to https://www.[employer].net/patientEd and enter F075 to learn more about \"A Healthy Heart: Care Instructions.\"  Current as of: July 31, 2024  Content Version: 14.4  © 5728-8583 [employer].   Care instructions adapted under license by [place]. If you have questions about a medical condition or this instruction, always ask your healthcare professional. [employer], disclaims any warranty or liability for your use of this information.    Personalized Preventive Plan for Esvin Ferreira - 4/7/2025  Medicare offers a range of preventive health benefits. Some of the tests and screenings are paid in full while other may be subject to a deductible, co-insurance, and/or copay.  Some of these benefits include a comprehensive review of your medical history including lifestyle, illnesses that may run in your family, and various assessments and screenings as appropriate.  After reviewing your medical record and screening and assessments performed today your provider may have ordered immunizations, labs, imaging, and/or referrals for you.  A list of these orders (if applicable) as well as your Preventive Care list are included within your After Visit Summary for your review.

## 2025-04-07 ENCOUNTER — OFFICE VISIT (OUTPATIENT)
Age: 89
End: 2025-04-07
Payer: MEDICARE

## 2025-04-07 VITALS
DIASTOLIC BLOOD PRESSURE: 67 MMHG | HEART RATE: 75 BPM | BODY MASS INDEX: 22.65 KG/M2 | OXYGEN SATURATION: 99 % | WEIGHT: 158.2 LBS | SYSTOLIC BLOOD PRESSURE: 109 MMHG | HEIGHT: 70 IN | RESPIRATION RATE: 15 BRPM | TEMPERATURE: 98.1 F

## 2025-04-07 DIAGNOSIS — Z23 NEED FOR PROPHYLACTIC VACCINATION AGAINST STREPTOCOCCUS PNEUMONIAE (PNEUMOCOCCUS): ICD-10-CM

## 2025-04-07 DIAGNOSIS — Z00.00 MEDICARE ANNUAL WELLNESS VISIT, SUBSEQUENT: Primary | ICD-10-CM

## 2025-04-07 DIAGNOSIS — E78.2 MIXED HYPERLIPIDEMIA: ICD-10-CM

## 2025-04-07 DIAGNOSIS — N18.31 STAGE 3A CHRONIC KIDNEY DISEASE (HCC): ICD-10-CM

## 2025-04-07 DIAGNOSIS — F11.99 OPIOID USE, UNSPECIFIED WITH UNSPECIFIED OPIOID-INDUCED DISORDER: ICD-10-CM

## 2025-04-07 DIAGNOSIS — R63.4 WEIGHT LOSS: ICD-10-CM

## 2025-04-07 DIAGNOSIS — K21.9 GASTROESOPHAGEAL REFLUX DISEASE WITHOUT ESOPHAGITIS: ICD-10-CM

## 2025-04-07 DIAGNOSIS — M15.0 PRIMARY OSTEOARTHRITIS INVOLVING MULTIPLE JOINTS: ICD-10-CM

## 2025-04-07 DIAGNOSIS — Z85.46 H/O PROSTATE CANCER: ICD-10-CM

## 2025-04-07 DIAGNOSIS — I10 PRIMARY HYPERTENSION: ICD-10-CM

## 2025-04-07 DIAGNOSIS — I77.810 DILATED AORTIC ROOT: ICD-10-CM

## 2025-04-07 DIAGNOSIS — N40.0 BENIGN PROSTATIC HYPERPLASIA WITHOUT LOWER URINARY TRACT SYMPTOMS: ICD-10-CM

## 2025-04-07 DIAGNOSIS — I50.22 CHRONIC SYSTOLIC (CONGESTIVE) HEART FAILURE: ICD-10-CM

## 2025-04-07 DIAGNOSIS — R73.01 IFG (IMPAIRED FASTING GLUCOSE): ICD-10-CM

## 2025-04-07 PROCEDURE — G0009 ADMIN PNEUMOCOCCAL VACCINE: HCPCS | Performed by: INTERNAL MEDICINE

## 2025-04-07 PROCEDURE — 99214 OFFICE O/P EST MOD 30 MIN: CPT | Performed by: INTERNAL MEDICINE

## 2025-04-07 PROCEDURE — PBSHW PNEUMOCOCCAL, PCV20, PREVNAR 20, (AGE 6W+), IM, PF: Performed by: INTERNAL MEDICINE

## 2025-04-07 PROCEDURE — G0439 PPPS, SUBSEQ VISIT: HCPCS | Performed by: INTERNAL MEDICINE

## 2025-04-07 PROCEDURE — 1159F MED LIST DOCD IN RCRD: CPT | Performed by: INTERNAL MEDICINE

## 2025-04-07 PROCEDURE — 1123F ACP DISCUSS/DSCN MKR DOCD: CPT | Performed by: INTERNAL MEDICINE

## 2025-04-07 SDOH — ECONOMIC STABILITY: FOOD INSECURITY: WITHIN THE PAST 12 MONTHS, YOU WORRIED THAT YOUR FOOD WOULD RUN OUT BEFORE YOU GOT MONEY TO BUY MORE.: NEVER TRUE

## 2025-04-07 SDOH — ECONOMIC STABILITY: FOOD INSECURITY: WITHIN THE PAST 12 MONTHS, THE FOOD YOU BOUGHT JUST DIDN'T LAST AND YOU DIDN'T HAVE MONEY TO GET MORE.: NEVER TRUE

## 2025-04-07 ASSESSMENT — PATIENT HEALTH QUESTIONNAIRE - PHQ9
SUM OF ALL RESPONSES TO PHQ QUESTIONS 1-9: 0
2. FEELING DOWN, DEPRESSED OR HOPELESS: NOT AT ALL
1. LITTLE INTEREST OR PLEASURE IN DOING THINGS: NOT AT ALL

## 2025-04-07 NOTE — PROGRESS NOTES
Medicare Annual Wellness Visit    Esvin Ferreira is here for Medicare AWV    Assessment & Plan   Medicare annual wellness visit, subsequent  Dilated aortic root  Primary hypertension  Chronic systolic (congestive) heart failure  Gastroesophageal reflux disease without esophagitis  IFG (impaired fasting glucose)  Opioid use, unspecified with unspecified opioid-induced disorder  -     Compliance Drug Analysis, Urine; Future  -     Basic Metabolic Panel; Future  -     Lipid Panel; Future  Primary osteoarthritis involving multiple joints  -     [facility] - Rob Andrea MD, Orthopedic Surgery (knee), [place]  Stage 3a chronic kidney disease (HCC)  -     Compliance Drug Analysis, Urine; Future  -     Basic Metabolic Panel; Future  -     Lipid Panel; Future  Mixed hyperlipidemia  -     Compliance Drug Analysis, Urine; Future  -     Basic Metabolic Panel; Future  -     Lipid Panel; Future  Benign prostatic hyperplasia without lower urinary tract symptoms  H/O prostate cancer  Weight loss  Need for prophylactic vaccination against Streptococcus pneumoniae (pneumococcus)  -     Pneumococcal, PCV20, PREVNAR 20, (age 6w+), IM, PF        Return in about 3 months (around 7/7/2025).     Subjective       Patient's complete Health Risk Assessment and screening values have been reviewed and are found in Flowsheets. The following problems were reviewed today and where indicated follow up appointments were made and/or referrals ordered.    Positive Risk Factor Screenings with Interventions:          Controlled Medication Review:    Today's Pain Level: No data recorded   Opioid Risk: (Low risk score <55) Opioid risk score: 14    Patient is low risk for opioid use disorder or overdose.    Last PDMP [place] as Reviewed:  Review User Review Instant Review Result                    Dentist Screen:  Have you seen the dentist within the past year?: (!) No  Intervention:  Advised to schedule with their dentist                  Objective 
\"Have you been to the ER, urgent care clinic since your last visit?  Hospitalized since your last visit?\"    NO    “Have you seen or consulted any other health care providers outside our system since your last visit?”    NO           
is in the normal range she has lost weight again since last office visit total weight loss is somewhere between 30 and 40 pounds over the last several years    Discussed this again with him today he is not interested in any additional evaluation or workup for the weight loss see above      I have reviewed the note documented by the scribe.  The services provided are my own.  The documentation is accurate   Depression screen reviewed and negative.  Scribed by Yojana Perez of [facility], as dictated by Dr. Tete Cody.    Current diagnosis and concerns discussed with pt at length. Pt understands risks and benefits or current treatment plan and medications, and accepts the treatment and medication with any possible risks. Pt asks appropriate questions, which were answered. Pt was instructed to call with any concerns or problems.    I have reviewed the note documented by the scribe. The services provided are my own. The documentation is accurate.

## 2025-05-23 ENCOUNTER — HOSPITAL ENCOUNTER (OUTPATIENT)
Facility: HOSPITAL | Age: 89
Discharge: HOME | End: 2025-05-26
Attending: ORTHOPAEDIC SURGERY
Payer: MEDICARE

## 2025-05-23 DIAGNOSIS — M17.12 ARTHRITIS OF LEFT KNEE: ICD-10-CM

## 2025-05-23 PROCEDURE — 73700 CT LOWER EXTREMITY W/O DYE: CPT

## 2025-05-27 ENCOUNTER — HOSPITAL ENCOUNTER (OUTPATIENT)
Facility: HOSPITAL | Age: 89
Discharge: HOME | End: 2025-05-30
Payer: MEDICARE

## 2025-05-27 ENCOUNTER — OFFICE VISIT (OUTPATIENT)
Age: 89
End: 2025-05-27
Payer: MEDICARE

## 2025-05-27 ENCOUNTER — RESULTS FOLLOW-UP (OUTPATIENT)
Age: 89
End: 2025-05-27

## 2025-05-27 VITALS
RESPIRATION RATE: 20 BRPM | BODY MASS INDEX: 24.93 KG/M2 | DIASTOLIC BLOOD PRESSURE: 61 MMHG | HEIGHT: 68 IN | HEART RATE: 77 BPM | TEMPERATURE: 97.5 F | SYSTOLIC BLOOD PRESSURE: 118 MMHG | WEIGHT: 164.46 LBS

## 2025-05-27 VITALS
TEMPERATURE: 97.9 F | BODY MASS INDEX: 23.48 KG/M2 | SYSTOLIC BLOOD PRESSURE: 99 MMHG | WEIGHT: 164 LBS | RESPIRATION RATE: 15 BRPM | OXYGEN SATURATION: 98 % | DIASTOLIC BLOOD PRESSURE: 68 MMHG | HEIGHT: 70 IN | HEART RATE: 90 BPM

## 2025-05-27 DIAGNOSIS — E78.2 MIXED HYPERLIPIDEMIA: ICD-10-CM

## 2025-05-27 DIAGNOSIS — Z85.46 H/O PROSTATE CANCER: ICD-10-CM

## 2025-05-27 DIAGNOSIS — N18.31 STAGE 3A CHRONIC KIDNEY DISEASE (HCC): ICD-10-CM

## 2025-05-27 DIAGNOSIS — R73.01 IFG (IMPAIRED FASTING GLUCOSE): ICD-10-CM

## 2025-05-27 DIAGNOSIS — I10 PRIMARY HYPERTENSION: ICD-10-CM

## 2025-05-27 DIAGNOSIS — M15.0 PRIMARY OSTEOARTHRITIS INVOLVING MULTIPLE JOINTS: ICD-10-CM

## 2025-05-27 DIAGNOSIS — K21.9 GASTROESOPHAGEAL REFLUX DISEASE WITHOUT ESOPHAGITIS: ICD-10-CM

## 2025-05-27 DIAGNOSIS — I50.22 CHRONIC SYSTOLIC (CONGESTIVE) HEART FAILURE (HCC): ICD-10-CM

## 2025-05-27 DIAGNOSIS — Z01.818 PREOP EXAMINATION: Primary | ICD-10-CM

## 2025-05-27 LAB
ALBUMIN SERPL-MCNC: 3.4 G/DL (ref 3.5–5)
ALBUMIN/GLOB SERPL: 0.9 (ref 1.1–2.2)
ALP SERPL-CCNC: 100 U/L (ref 45–117)
ALT SERPL-CCNC: 16 U/L (ref 12–78)
ANION GAP SERPL CALC-SCNC: 8 MMOL/L (ref 2–12)
APPEARANCE UR: ABNORMAL
AST SERPL-CCNC: 13 U/L (ref 15–37)
BACTERIA URNS QL MICRO: ABNORMAL /HPF
BILIRUB SERPL-MCNC: 0.7 MG/DL (ref 0.2–1)
BILIRUB UR QL: NEGATIVE
BUN SERPL-MCNC: 43 MG/DL (ref 6–20)
BUN/CREAT SERPL: 21 (ref 12–20)
CALCIUM SERPL-MCNC: 9.3 MG/DL (ref 8.5–10.1)
CHLORIDE SERPL-SCNC: 112 MMOL/L (ref 97–108)
CO2 SERPL-SCNC: 22 MMOL/L (ref 21–32)
COLOR UR: ABNORMAL
CREAT SERPL-MCNC: 2.03 MG/DL (ref 0.7–1.3)
EPITH CASTS URNS QL MICRO: ABNORMAL /LPF
ERYTHROCYTE [DISTWIDTH] IN BLOOD BY AUTOMATED COUNT: 14 % (ref 11.5–14.5)
EST. AVERAGE GLUCOSE BLD GHB EST-MCNC: 123 MG/DL
GLOBULIN SER CALC-MCNC: 3.9 G/DL (ref 2–4)
GLUCOSE SERPL-MCNC: 98 MG/DL (ref 65–100)
GLUCOSE UR STRIP.AUTO-MCNC: NEGATIVE MG/DL
HBA1C MFR BLD: 5.9 % (ref 4–5.6)
HCT VFR BLD AUTO: 43.5 % (ref 36.6–50.3)
HGB BLD-MCNC: 13.9 G/DL (ref 12.1–17)
HGB UR QL STRIP: NEGATIVE
INR PPP: 1.2 (ref 0.9–1.1)
KETONES UR QL STRIP.AUTO: NEGATIVE MG/DL
LEUKOCYTE ESTERASE UR QL STRIP.AUTO: ABNORMAL
MCH RBC QN AUTO: 30.8 PG (ref 26–34)
MCHC RBC AUTO-ENTMCNC: 32 G/DL (ref 30–36.5)
MCV RBC AUTO: 96.5 FL (ref 80–99)
NITRITE UR QL STRIP.AUTO: POSITIVE
NRBC # BLD: 0 K/UL (ref 0–0.01)
NRBC BLD-RTO: 0 PER 100 WBC
PH UR STRIP: >8.5 [PH] (ref 5–8)
PLATELET # BLD AUTO: 178 K/UL (ref 150–400)
PMV BLD AUTO: 11.4 FL (ref 8.9–12.9)
POTASSIUM SERPL-SCNC: 4.1 MMOL/L (ref 3.5–5.1)
PROT SERPL-MCNC: 7.3 G/DL (ref 6.4–8.2)
PROT UR STRIP-MCNC: 30 MG/DL
PROTHROMBIN TIME: 12.4 SEC (ref 9.2–11.2)
RBC # BLD AUTO: 4.51 M/UL (ref 4.1–5.7)
RBC #/AREA URNS HPF: ABNORMAL /HPF (ref 0–5)
SODIUM SERPL-SCNC: 142 MMOL/L (ref 136–145)
SP GR UR REFRACTOMETRY: 1.02
URINE CULTURE IF INDICATED: ABNORMAL
UROBILINOGEN UR QL STRIP.AUTO: 1 EU/DL (ref 0.2–1)
WBC # BLD AUTO: 6.7 K/UL (ref 4.1–11.1)
WBC URNS QL MICRO: ABNORMAL /HPF (ref 0–4)

## 2025-05-27 PROCEDURE — 85027 COMPLETE CBC AUTOMATED: CPT

## 2025-05-27 PROCEDURE — 1159F MED LIST DOCD IN RCRD: CPT | Performed by: INTERNAL MEDICINE

## 2025-05-27 PROCEDURE — 93010 ELECTROCARDIOGRAM REPORT: CPT | Performed by: INTERNAL MEDICINE

## 2025-05-27 PROCEDURE — 99214 OFFICE O/P EST MOD 30 MIN: CPT | Performed by: INTERNAL MEDICINE

## 2025-05-27 PROCEDURE — 83036 HEMOGLOBIN GLYCOSYLATED A1C: CPT

## 2025-05-27 PROCEDURE — 80053 COMPREHEN METABOLIC PANEL: CPT

## 2025-05-27 PROCEDURE — 87088 URINE BACTERIA CULTURE: CPT

## 2025-05-27 PROCEDURE — 87186 SC STD MICRODIL/AGAR DIL: CPT

## 2025-05-27 PROCEDURE — 87086 URINE CULTURE/COLONY COUNT: CPT

## 2025-05-27 PROCEDURE — 81001 URINALYSIS AUTO W/SCOPE: CPT

## 2025-05-27 PROCEDURE — 93005 ELECTROCARDIOGRAM TRACING: CPT | Performed by: INTERNAL MEDICINE

## 2025-05-27 PROCEDURE — 1123F ACP DISCUSS/DSCN MKR DOCD: CPT | Performed by: INTERNAL MEDICINE

## 2025-05-27 PROCEDURE — 97530 THERAPEUTIC ACTIVITIES: CPT

## 2025-05-27 PROCEDURE — 36415 COLL VENOUS BLD VENIPUNCTURE: CPT

## 2025-05-27 PROCEDURE — 85610 PROTHROMBIN TIME: CPT

## 2025-05-27 PROCEDURE — 97161 PT EVAL LOW COMPLEX 20 MIN: CPT

## 2025-05-27 RX ORDER — SODIUM CHLORIDE, SODIUM LACTATE, POTASSIUM CHLORIDE, CALCIUM CHLORIDE 600; 310; 30; 20 MG/100ML; MG/100ML; MG/100ML; MG/100ML
INJECTION, SOLUTION INTRAVENOUS CONTINUOUS
OUTPATIENT
Start: 2025-06-03

## 2025-05-27 RX ORDER — CELECOXIB 200 MG/1
400 CAPSULE ORAL ONCE
OUTPATIENT
Start: 2025-06-03

## 2025-05-27 RX ORDER — CEFAZOLIN SODIUM/WATER 2 G/20 ML
2000 SYRINGE (ML) INTRAVENOUS ONCE
OUTPATIENT
Start: 2025-06-03

## 2025-05-27 RX ORDER — LEVOCETIRIZINE DIHYDROCHLORIDE 5 MG/1
5 TABLET, FILM COATED ORAL NIGHTLY
Qty: 90 TABLET | Refills: 1 | Status: SHIPPED | OUTPATIENT
Start: 2025-05-27

## 2025-05-27 RX ORDER — MULTIVIT-MIN/IRON/FOLIC ACID/K 18-600-40
2000 CAPSULE ORAL DAILY
COMMUNITY

## 2025-05-27 RX ORDER — ACETAMINOPHEN 500 MG
1000 TABLET ORAL ONCE
OUTPATIENT
Start: 2025-06-03

## 2025-05-27 ASSESSMENT — KOOS JR
RISING FROM SITTING: MODERATE
TWISING OR PIVOTING ON KNEE: SEVERE
BENDING TO THE FLOOR TO PICK UP OBJECT: MODERATE
STANDING UPRIGHT: MODERATE
HOW SEVERE IS YOUR KNEE STIFFNESS AFTER FIRST WAKING IN MORNING: MODERATE
STRAIGHTENING KNEE FULLY: MODERATE
GOING UP OR DOWN STAIRS: SEVERE
KOOS JR TOTAL INTERVAL SCORE: 47.487

## 2025-05-27 ASSESSMENT — PAIN DESCRIPTION - DESCRIPTORS: DESCRIPTORS: ACHING

## 2025-05-27 ASSESSMENT — PROMIS GLOBAL HEALTH SCALE
IN THE PAST 7 DAYS, HOW WOULD YOU RATE YOUR FATIGUE ON AVERAGE [ON A SCALE FROM 1 (NONE) TO 5 (VERY SEVERE)]?: MODERATE
HOW IS THE PROMIS V1.1 BEING ADMINISTERED?: PAPER
SUM OF RESPONSES TO QUESTIONS 2, 4, 5, & 10: 14
IN GENERAL, WOULD YOU SAY YOUR HEALTH IS...[ON A SCALE OF 1 (POOR) TO 5 (EXCELLENT)]: GOOD
SUM OF RESPONSES TO QUESTIONS 3, 6, 7, & 8: 14
IN GENERAL, HOW WOULD YOU RATE YOUR SATISFACTION WITH YOUR SOCIAL ACTIVITIES AND RELATIONSHIPS [ON A SCALE OF 1 (POOR) TO 5 (EXCELLENT)]?: VERY GOOD
IN THE PAST 7 DAYS, HOW OFTEN HAVE YOU BEEN BOTHERED BY EMOTIONAL PROBLEMS, SUCH AS FEELING ANXIOUS, DEPRESSED, OR IRRITABLE [ON A SCALE FROM 1 (NEVER) TO 5 (ALWAYS)]?: RARELY
IN GENERAL, PLEASE RATE HOW WELL YOU CARRY OUT YOUR USUAL SOCIAL ACTIVITIES (INCLUDES ACTIVITIES AT HOME, AT WORK, AND IN YOUR COMMUNITY, AND RESPONSIBILITIES AS A PARENT, CHILD, SPOUSE, EMPLOYEE, FRIEND, ETC) [ON A SCALE OF 1 (POOR) TO 5 (EXCELLENT)]?: VERY GOOD
IN GENERAL, WOULD YOU SAY YOUR QUALITY OF LIFE IS...[ON A SCALE OF 1 (POOR) TO 5 (EXCELLENT)]: GOOD
TO WHAT EXTENT ARE YOU ABLE TO CARRY OUT YOUR EVERYDAY PHYSICAL ACTIVITIES SUCH AS WALKING, CLIMBING STAIRS, CARRYING GROCERIES, OR MOVING A CHAIR [ON A SCALE OF 1 (NOT AT ALL) TO 5 (COMPLETELY)]?: A LITTLE
WHO IS THE PERSON COMPLETING THE PROMIS V1.1 SURVEY?: SELF
IN GENERAL, HOW WOULD YOU RATE YOUR MENTAL HEALTH, INCLUDING YOUR MOOD AND YOUR ABILITY TO THINK [ON A SCALE OF 1 (POOR) TO 5 (EXCELLENT)]?: GOOD
IN GENERAL, HOW WOULD YOU RATE YOUR PHYSICAL HEALTH [ON A SCALE OF 1 (POOR) TO 5 (EXCELLENT)]?: GOOD
IN THE PAST 7 DAYS, HOW WOULD YOU RATE YOUR PAIN ON AVERAGE [ON A SCALE FROM 0 (NO PAIN) TO 10 (WORST IMAGINABLE PAIN)]?: 6

## 2025-05-27 ASSESSMENT — PATIENT HEALTH QUESTIONNAIRE - PHQ9
1. LITTLE INTEREST OR PLEASURE IN DOING THINGS: NOT AT ALL
2. FEELING DOWN, DEPRESSED OR HOPELESS: NOT AT ALL
SUM OF ALL RESPONSES TO PHQ QUESTIONS 1-9: 0

## 2025-05-27 ASSESSMENT — PAIN DESCRIPTION - ORIENTATION: ORIENTATION: LEFT

## 2025-05-27 ASSESSMENT — PAIN DESCRIPTION - LOCATION: LOCATION: KNEE

## 2025-05-27 ASSESSMENT — PAIN SCALES - GENERAL: PAINLEVEL_OUTOF10: 3

## 2025-05-27 ASSESSMENT — ENCOUNTER SYMPTOMS: SHORTNESS OF BREATH: 0

## 2025-05-27 NOTE — PERIOP NOTE
PAT Nurse Practitioner   Pre-Operative Chart Review/Assessment            Patient Name:  Esvin Ferreira          Age:   89 y.o.    :  1935    Surgeon:   Emre     Primary Care Provider:    Tete Cody MD    Date of PAT:  25     Date of Surgery:    6/3/2025     Procedure(s):  LEFT TOTAL KNEE ARTHROPLASTY (GABY)     Anesthesia:  Choice     Pertinent Medical History:         CKD stage 3B        Hypertension         Hyperlipiemia        GERD        NICM - normalized          H/o prostate cancer           Allergies:  No Known Allergies           Chart Review and Plan:     Pertinent abnormal Lab findings: *** Gram negative rods: treating with cipro 250mg BID x 5 days given age, weight, and CKD.  Patient called and notified of medication, goals of treatment, and prescription status.       1)  Cardiac Review :  ***Richi   Deemed intermediate risk by PCP given age and mobility status    EKG (11/15/2024):  normal sinus rhythm, no acute ischemia    TTE (2024) ef 55-60%, grade 1 diastolic dysfunction.  Ascending aorta 4.5 cm.   MPI (2021) normal per cardiology note      2)  Sleep apnea screening: STOP BAN       3)  Program for Diabetes Health Consult: Not indicated, A1c 5.9      5) Discharge plan: ambulatory for limited distances; uses cane at all times. Lives in one level home, lives alone; walk in shower with seat and grab bar.       6) Medication recommendations prior to surgery:    Follow Dr Andrea' instructions and including holding Aspirin, Nsaids (Diclofenac),vitamins and supplements for 1 week     Practice incentive spirometry twice a day ten times each and bring day of of surgery     TAKE ALL MEDICATIONS THE DAY OF SURGERY EXCEPT: None (will have already stopped diclofenac and vitamin D)      7) Additional Concerns:  ***            Vital Signs:       /61   Pulse 77   Temp 97.5 °F (36.4 °C) (Oral)   Resp 20   Ht 1.727 m (5' 8\")   Wt 74.6 kg (164 lb 7.4 oz)   BMI 25.01 kg/m²

## 2025-05-27 NOTE — PROGRESS NOTES
HISTORY OF PRESENT ILLNESS  Esvin Ferreira is a 89 y.o. male.  HPI  Last here 4/7/25. Pt is here to f/u on chronic conditions.   Ambulates with cane     reviewed     Medicare awv 4/7/25    He is scheduled for L knee replacement with Dr Andrea 6/3/25 he had preop blood work done this morning    Pt denies cp, sob, palpitations, orthopnea, claudication, PND, and new swelling in legs  Pt can sleep laying flat  Pt can walk up a set of stairs very slowly  Pt can walk around the mall  barrett rest   Pt can vacuum and do laundry  Limited walking, uses cane     Functional mets  < 4         Has a history of hypertension   BP today is 99/68  No home BP readings for review  He is taking coreg 6.25 mg BID for cardiomyopathy -   No longer on lisinopril 5mg daily to protect his kidneys d/t high k      Wt today is 164 lbs, lov 158 lbs  He states he got sick at [place] and lost ~15 lbs, was down to 150lbs at that time     Recall a total of over 30 pounds in the last couple of years but was previously 168 back in 2019 reviewed his weights he had gained weight in 2021 and 2020 his max was 180 pounds in 2021 previously in 2019 his weight was 169     He is not interested in any workup for weight loss--addressed this again today           Recall last EGD 2016   Previously discussed an EGD would be a good idea due to weight loss and hx of stomach upset. Discussed getting a CT of the abdomen for further evaluation as well  Recall discussed at length my recommendation is to see Dr. High (GI) to rule out GI cancer, gave his information.   Discussed options for evaluation he is not interested in any evaluation he states he will think about it, I have discussed that if we do not evaluate this further we do potentially miss diagnosis of cancer he expressed understanding he is not interested in further evaluation at this time.        Discussed the above again today and that I still recommend he see Dr. High and that he should have a CT 
some hydronephrosis at that time and he saw urology for evaluation     Renal ultrasound completed March 31, 2023 atrophic left kidney  SPEP completed June 2023 negative UA negative  Did give information for nephrology has not gone not interested overall kidney function is stable     Pt lives alone     ACP not on file. SDM is his son (Syd Ferreira).       Recall he has a dx of prostate cancer since January 2013 , previously saw cecilio annually and last saw him in January 2015--states he was released from care at that time.    Recall the patient has h/o ckd, baseline 1.6-1.7  Recall that he Used to follow with rheumatologist dr fernandez years ago --currently not seeing anyone.      Recall the patient has hansens and a h/o stomach ulcer, previously saw justin for this. Sx controlled with prilosec bid in the past no longer takes anything, not interested in going back discussed this in terms of his weight loss again he declines further evaluation see above      PREVENTIVE:    Colonoscopy: 10/22/13, Dr. High, repeat 10 years, none further   EGD: 4/16/16, Dr High, repeat 3 years, cancelled declines repeat  PSA: 1/13 dx'd with prostate cancer,  12/18 0.2, followed by Dr. Camacho, 12/19 0.3 declines further evaluation  AAA screen: 10/10, negative  Tdap: declines further   Pneumovax: 12/20/2019  Prevnar 13: 6/14/2017  [id number]: 4/7/25     Shingrix: both doses complete  Flu shot: 9/24  RSV: 1/25  Eye exam: Dr. Washington  2/24  Lipids: 7/24 139 LDL  A1c: 9/22 5.5 3/23 6.0 9/23 5.6 1/24 5.7 7/24 5.6 3/25 5.4   Pain contract: 8/7/24  Urine drug screen: c/w tramadol, 12/3/24  Covid: 4 doses (pfizer) + 2 Moderna, most recent 1/25     Patient Active Problem List   Diagnosis    Perennial allergic rhinitis    DJD (degenerative joint disease), multiple sites    GERD (gastroesophageal reflux disease)    Benign prostatic hyperplasia    Vitamin D deficiency    Opioid use, unspecified with unspecified opioid-induced disorder (HCC)

## 2025-05-27 NOTE — PROGRESS NOTES
Patient attended the Joint Replacement Education Class at [facility]. The content of the class was presented using a power point presentation specific for patients undergoing hip and knee replacement surgery. The [facility] Joint Replacement Education Handbook was given to the patient.  Preparing for surgery, day of surgery routine and expectations, discharge process and help at home expectations, nutrition,medications, infection control, pain management, DVT prevention, ice therapy and safety were reviewed in class. Opportunity for questions provided, patient verbalized understanding of instructions.    Patient reports has RW for after surgery, will bring to hospital on DOS 6/3/2025.  Patient reports  is Syd Ferreira/son and plans to discharge to home.    PROMis/KOOS completed 2025.  KNEE DISABILITY OSTEOARTHRITIS AND OUTCOME SCORE    Stiffness - The following question concerns the amount of joing stiffness you have experienced during the last week in your knee. Stiffness is a sensation of restriction or slowness in the ease with which you move your knee joint.  How severe is your knee stiffness after first wakening in the morning?: 2        Pain - What amount of knee pain have you experienced the last week during the following activities?  Twisting/pivoting on your knee: 3  Straightening knee fully: 2  Going up or down stairs: 3  Standing upright: 2        Function - Please indicate the degree of difficulty you have experienced in the last week due to your knee.  Rising from sittin  Bending to floor/ an object: 2        Raw Score  Jr ANISA. Knee Survey Score: 16  KOOS JR Total Interval Score (0-100 Scale): 47.487      PROMIS Questions    In general, would you say your health is:: 3    In general, would you say your quality of life is:: 3    In general, how would you rate your physical health?: 3    In general, how would you rate your mental health,

## 2025-05-27 NOTE — PROGRESS NOTES

## 2025-05-27 NOTE — PROGRESS NOTES
[facility]  Joint/Spine Preoperative Instructions        Surgery Date Yvonne 3          Time of Arrival to be called on May 30 between 2 and 5pm   Contact#[phone] or [phone] (mobile)    1. On the day of your surgery, please report to the Surgical Services Registration Desk and sign in at your designated time. The Surgery Center is located to the right of the Emergency Room.     2. You must have someone with you to drive you home. You should not drive a car for 24 hours following surgery. Please make arrangements for a friend or family member to stay with you for the first 24 hours after your surgery.    3. No food after midnight June 2.  Medications morning of surgery should be taken with a sip of water.  Please follow pre-surgery drink instructions that were given at your Pre Admission Testing appointment.      4. We recommend you do not drink any alcoholic beverages for 24 hours before and after your surgery.    5. Contact your surgeon’s office for instructions on the following medications: non-steroidal anti-inflammatory drugs (i.e. Advil, Aleve), vitamins, and supplements. (Some surgeon’s will want you to stop these medications prior to surgery and others may allow you to take them)  **If you are currently taking Plavix, Coumadin, Aspirin and/or other blood-thinning agents, contact your surgeon for instructions.** Your surgeon will partner with the physician prescribing these medications to determine if it is safe to stop or if you need to continue taking.  Please do not stop taking these medications without instructions from your surgeon    6. Wear comfortable clothes.  Wear glasses instead of contacts.  Do not bring any money or jewelry. Please bring picture ID, insurance card, and any prearranged co-payment or hospital payment.  Do not wear make-up, particularly mascara the morning of your surgery.  Do not wear nail polish, particularly if you are having foot /hand  pt c/o R-lower abd pain with nausea x 30 minutes.  woke pt from sleep.  LMP  2/19/19

## 2025-05-27 NOTE — PROGRESS NOTES
[facility]  Physical Therapy Pre-surgery evaluation  [address] [place] [address]    PHYSICAL THERAPY PRE TKR SURGERY EVALUATION    Date: 2025  Patient: Esvin Ferreira (89 y.o. male)  : 1935  Medical Diagnosis: Encounter for other preprocedural examination [Z01.818]  Procedure(s) (LRB):  LEFT TOTAL KNEE ARTHROPLASTY (GABY) (Left)     Treatment Diagnosis: M25.562  LEFT KNEE PAIN    Referral Source: Rob Andrea MD  Provider #: Rob Andrea   NPI #: [id number]   Precautions:        ASSESSMENT :  Based on the objective data described below, the patient presents with impaired gait, balance, pain, and overall high level functional mobility due to end stage degenerative joint disease in the left knee.     Discussed anticipated disposition to home with possible discharge within a 0 to 2 day time frame post-surgery. Patient's  was not present for the session. Patient in agreement. Patient has been educated on the recommendation for reliable help following surgery and has arranged for at least 24 hours of care after discharge.        The patient indicated he is  interested to discharge day of surgery if all discharge criteria met, but understands that its more likely he will spend one night in the hospital. Patient voiced good  understanding of therapy specific criteria to discharge day of surgery. Patient with fair potential for discharging same day of surgery based on PMH, current condition, and fall history.( Cardiac history, one fall a year ago, unsteady gait)    Fast Track pathway: [] YES [x] NO   Patient agrees to arrange at least 24 hours of care following surgery: [x] YES will stay with son [] NO   Patient has outpatient PT appointments scheduled:  [x] YES [] NO       GOALS: (Goals have been discussed and agreed upon with patient.)  DISCHARGE GOALS: Time Frame: 1 DAY  Patient will demonstrate increased strength, range of motion, and pain control via a home

## 2025-05-27 NOTE — PROGRESS NOTES
Obtained EKG and Notes fro Dr Stoddard date 11/15/2024- Pt reports paper form for clearance was sent per Dr Andrea. PCP appt today with Escobar SANDHU

## 2025-05-27 NOTE — PROGRESS NOTES

## 2025-05-28 DIAGNOSIS — N30.00 ACUTE CYSTITIS WITHOUT HEMATURIA: Primary | ICD-10-CM

## 2025-05-28 DIAGNOSIS — N18.30 STAGE 3 CHRONIC KIDNEY DISEASE, UNSPECIFIED WHETHER STAGE 3A OR 3B CKD (HCC): ICD-10-CM

## 2025-05-28 RX ORDER — CIPROFLOXACIN 250 MG/1
250 TABLET, FILM COATED ORAL 2 TIMES DAILY
Qty: 10 TABLET | Refills: 0 | Status: SHIPPED | OUTPATIENT
Start: 2025-05-28 | End: 2025-06-02

## 2025-05-30 LAB
BACTERIA SPEC CULT: ABNORMAL
CC UR VC: ABNORMAL
SERVICE CMNT-IMP: ABNORMAL

## 2025-06-02 ENCOUNTER — ANESTHESIA EVENT (OUTPATIENT)
Facility: HOSPITAL | Age: 89
End: 2025-06-02
Payer: MEDICARE

## 2025-06-02 NOTE — DISCHARGE INSTRUCTIONS
Discharge Instructions:  Esvin Ferreira    Surgery: TOTAL KNEE REPLACEMENT.        To relieve pain:  Use ice/gel packs.    -Put the ice pack directly over the wound, or anywhere you are hurting or swollen.   -To control pain and swelling, keep ice on regularly, especially after physical activity.  -The packs should stay cold for 3-4 hours.  When it is not cold anymore, rotate with the packs in the freezer.      Elevate your leg.  This will also keep swelling down.    Rest for at least 20 minutes between activity or exercises.    To keep track of your pain medications, write down what you take and when you take it.    The last dose of pain medication you got in the hospital was:     Medication    Dose    Date & Time          To keep your pain under control, you can take Tylenol every 6 hours while you are awake  for the first week.     Take your as needed pain medications  as prescribed on the bottle.     To prevent nausea, take your pain medications with food.          As your pain lessens:    Slowly start taking less pain medication. You may do this by waiting longer between doses or by taking smaller doses.    Stop using the pain medications as soon as you no longer need it, usually in 1-2 weeks         Aspirin  To prevent blood clots, you will need to take Aspirin 81 mg twice a day for 30 days.              To prevent stomach upset or bleeding:  Take Pepcid 20 mg twice a day, or a similar home medication, while you are taking a blood thinner.         Keep your waterproof dressing in place. It will be removed by your surgeon during your follow-up appointment in 2 weeks.     You may need to change the dressing if you are having drainage to where the dressing is no longer intact. You will be given an extra dressing to use at home.    You will have some swelling, warmth, and bruising around the incision and up and down the leg after surgery.  This will may get worse in the first few days you are home and will

## 2025-06-03 ENCOUNTER — ANESTHESIA (OUTPATIENT)
Facility: HOSPITAL | Age: 89
End: 2025-06-03
Payer: MEDICARE

## 2025-06-03 ENCOUNTER — HOSPITAL ENCOUNTER (OUTPATIENT)
Facility: HOSPITAL | Age: 89
Setting detail: OBSERVATION
Discharge: HOME | End: 2025-06-03
Attending: ORTHOPAEDIC SURGERY | Admitting: ORTHOPAEDIC SURGERY
Payer: MEDICARE

## 2025-06-03 VITALS
HEART RATE: 72 BPM | OXYGEN SATURATION: 98 % | DIASTOLIC BLOOD PRESSURE: 80 MMHG | WEIGHT: 161.6 LBS | SYSTOLIC BLOOD PRESSURE: 148 MMHG | BODY MASS INDEX: 23.13 KG/M2 | HEIGHT: 70 IN | TEMPERATURE: 97.7 F | RESPIRATION RATE: 16 BRPM

## 2025-06-03 DIAGNOSIS — Z96.652 S/P TOTAL KNEE ARTHROPLASTY, LEFT: Primary | ICD-10-CM

## 2025-06-03 PROCEDURE — 97116 GAIT TRAINING THERAPY: CPT

## 2025-06-03 PROCEDURE — 6360000002 HC RX W HCPCS: Performed by: ORTHOPAEDIC SURGERY

## 2025-06-03 PROCEDURE — 6370000000 HC RX 637 (ALT 250 FOR IP): Performed by: PHYSICIAN ASSISTANT

## 2025-06-03 PROCEDURE — 2580000003 HC RX 258

## 2025-06-03 PROCEDURE — G0378 HOSPITAL OBSERVATION PER HR: HCPCS

## 2025-06-03 PROCEDURE — 97530 THERAPEUTIC ACTIVITIES: CPT

## 2025-06-03 PROCEDURE — 64447 NJX AA&/STRD FEMORAL NRV IMG: CPT | Performed by: STUDENT IN AN ORGANIZED HEALTH CARE EDUCATION/TRAINING PROGRAM

## 2025-06-03 PROCEDURE — 6360000002 HC RX W HCPCS

## 2025-06-03 PROCEDURE — 6360000002 HC RX W HCPCS: Performed by: STUDENT IN AN ORGANIZED HEALTH CARE EDUCATION/TRAINING PROGRAM

## 2025-06-03 PROCEDURE — 2580000003 HC RX 258: Performed by: STUDENT IN AN ORGANIZED HEALTH CARE EDUCATION/TRAINING PROGRAM

## 2025-06-03 PROCEDURE — 6370000000 HC RX 637 (ALT 250 FOR IP): Performed by: ORTHOPAEDIC SURGERY

## 2025-06-03 PROCEDURE — 2500000003 HC RX 250 WO HCPCS: Performed by: ORTHOPAEDIC SURGERY

## 2025-06-03 PROCEDURE — APPNB180 APP NON BILLABLE TIME > 60 MINS

## 2025-06-03 PROCEDURE — 97162 PT EVAL MOD COMPLEX 30 MIN: CPT

## 2025-06-03 PROCEDURE — 2500000003 HC RX 250 WO HCPCS

## 2025-06-03 RX ORDER — SODIUM CHLORIDE, SODIUM LACTATE, POTASSIUM CHLORIDE, CALCIUM CHLORIDE 600; 310; 30; 20 MG/100ML; MG/100ML; MG/100ML; MG/100ML
INJECTION, SOLUTION INTRAVENOUS CONTINUOUS
Status: DISCONTINUED | OUTPATIENT
Start: 2025-06-03 | End: 2025-06-03 | Stop reason: HOSPADM

## 2025-06-03 RX ORDER — SODIUM CHLORIDE 0.9 % (FLUSH) 0.9 %
5-40 SYRINGE (ML) INJECTION EVERY 12 HOURS SCHEDULED
Status: DISCONTINUED | OUTPATIENT
Start: 2025-06-03 | End: 2025-06-03 | Stop reason: HOSPADM

## 2025-06-03 RX ORDER — FENTANYL CITRATE 50 UG/ML
25 INJECTION, SOLUTION INTRAMUSCULAR; INTRAVENOUS EVERY 5 MIN PRN
Status: DISCONTINUED | OUTPATIENT
Start: 2025-06-03 | End: 2025-06-03 | Stop reason: HOSPADM

## 2025-06-03 RX ORDER — EPHEDRINE SULFATE/0.9% NACL/PF 25 MG/5 ML
SYRINGE (ML) INTRAVENOUS
Status: DISCONTINUED | OUTPATIENT
Start: 2025-06-03 | End: 2025-06-03 | Stop reason: SDUPTHER

## 2025-06-03 RX ORDER — PROCHLORPERAZINE EDISYLATE 5 MG/ML
5 INJECTION INTRAMUSCULAR; INTRAVENOUS
Status: DISCONTINUED | OUTPATIENT
Start: 2025-06-03 | End: 2025-06-03 | Stop reason: HOSPADM

## 2025-06-03 RX ORDER — ACETAMINOPHEN 325 MG/1
650 TABLET ORAL EVERY 6 HOURS
Status: SHIPPED | COMMUNITY
Start: 2025-06-03

## 2025-06-03 RX ORDER — DIPHENHYDRAMINE HYDROCHLORIDE 50 MG/ML
25 INJECTION, SOLUTION INTRAMUSCULAR; INTRAVENOUS EVERY 6 HOURS PRN
Status: DISCONTINUED | OUTPATIENT
Start: 2025-06-03 | End: 2025-06-03 | Stop reason: HOSPADM

## 2025-06-03 RX ORDER — TRAMADOL HYDROCHLORIDE 50 MG/1
50 TABLET ORAL EVERY 12 HOURS PRN
Status: DISCONTINUED | OUTPATIENT
Start: 2025-06-03 | End: 2025-06-03

## 2025-06-03 RX ORDER — SODIUM CHLORIDE 0.9 % (FLUSH) 0.9 %
5-40 SYRINGE (ML) INJECTION PRN
Status: DISCONTINUED | OUTPATIENT
Start: 2025-06-03 | End: 2025-06-03 | Stop reason: HOSPADM

## 2025-06-03 RX ORDER — SENNA AND DOCUSATE SODIUM 50; 8.6 MG/1; MG/1
1 TABLET, FILM COATED ORAL 2 TIMES DAILY
Qty: 28 TABLET | Refills: 0 | Status: SHIPPED | OUTPATIENT
Start: 2025-06-03 | End: 2025-06-17

## 2025-06-03 RX ORDER — 0.9 % SODIUM CHLORIDE 0.9 %
500 INTRAVENOUS SOLUTION INTRAVENOUS ONCE AS NEEDED
Status: DISCONTINUED | OUTPATIENT
Start: 2025-06-03 | End: 2025-06-03 | Stop reason: HOSPADM

## 2025-06-03 RX ORDER — CELECOXIB 200 MG/1
400 CAPSULE ORAL ONCE
Status: COMPLETED | OUTPATIENT
Start: 2025-06-03 | End: 2025-06-03

## 2025-06-03 RX ORDER — PANTOPRAZOLE SODIUM 40 MG/1
40 TABLET, DELAYED RELEASE ORAL
Status: DISCONTINUED | OUTPATIENT
Start: 2025-06-04 | End: 2025-06-03 | Stop reason: HOSPADM

## 2025-06-03 RX ORDER — BISACODYL 10 MG
10 SUPPOSITORY, RECTAL RECTAL DAILY PRN
Status: DISCONTINUED | OUTPATIENT
Start: 2025-06-03 | End: 2025-06-03 | Stop reason: HOSPADM

## 2025-06-03 RX ORDER — TRANEXAMIC ACID 100 MG/ML
INJECTION, SOLUTION INTRAVENOUS
Status: DISCONTINUED | OUTPATIENT
Start: 2025-06-03 | End: 2025-06-03 | Stop reason: SDUPTHER

## 2025-06-03 RX ORDER — SODIUM CHLORIDE 9 MG/ML
INJECTION, SOLUTION INTRAVENOUS PRN
Status: DISCONTINUED | OUTPATIENT
Start: 2025-06-03 | End: 2025-06-03 | Stop reason: HOSPADM

## 2025-06-03 RX ORDER — PROPOFOL 10 MG/ML
INJECTION, EMULSION INTRAVENOUS
Status: DISCONTINUED | OUTPATIENT
Start: 2025-06-03 | End: 2025-06-03 | Stop reason: SDUPTHER

## 2025-06-03 RX ORDER — ASPIRIN 81 MG/1
81 TABLET ORAL 2 TIMES DAILY
Status: DISCONTINUED | OUTPATIENT
Start: 2025-06-03 | End: 2025-06-03 | Stop reason: HOSPADM

## 2025-06-03 RX ORDER — ONDANSETRON 4 MG/1
4 TABLET, ORALLY DISINTEGRATING ORAL EVERY 8 HOURS PRN
Status: DISCONTINUED | OUTPATIENT
Start: 2025-06-03 | End: 2025-06-03 | Stop reason: HOSPADM

## 2025-06-03 RX ORDER — IPRATROPIUM BROMIDE AND ALBUTEROL SULFATE 2.5; .5 MG/3ML; MG/3ML
1 SOLUTION RESPIRATORY (INHALATION)
Status: DISCONTINUED | OUTPATIENT
Start: 2025-06-03 | End: 2025-06-03 | Stop reason: HOSPADM

## 2025-06-03 RX ORDER — SENNA AND DOCUSATE SODIUM 50; 8.6 MG/1; MG/1
1 TABLET, FILM COATED ORAL 2 TIMES DAILY
Status: DISCONTINUED | OUTPATIENT
Start: 2025-06-03 | End: 2025-06-03 | Stop reason: HOSPADM

## 2025-06-03 RX ORDER — BUPIVACAINE HYDROCHLORIDE 5 MG/ML
INJECTION, SOLUTION EPIDURAL; INTRACAUDAL; PERINEURAL
Status: DISCONTINUED | OUTPATIENT
Start: 2025-06-03 | End: 2025-06-03 | Stop reason: SDUPTHER

## 2025-06-03 RX ORDER — CYCLOBENZAPRINE HCL 10 MG
10 TABLET ORAL EVERY 12 HOURS PRN
Status: DISCONTINUED | OUTPATIENT
Start: 2025-06-03 | End: 2025-06-03 | Stop reason: HOSPADM

## 2025-06-03 RX ORDER — ONDANSETRON 2 MG/ML
4 INJECTION INTRAMUSCULAR; INTRAVENOUS
Status: DISCONTINUED | OUTPATIENT
Start: 2025-06-03 | End: 2025-06-03 | Stop reason: HOSPADM

## 2025-06-03 RX ORDER — ASPIRIN 81 MG/1
81 TABLET ORAL 2 TIMES DAILY
Qty: 30 TABLET | Refills: 0 | Status: SHIPPED | OUTPATIENT
Start: 2025-06-03 | End: 2025-08-02

## 2025-06-03 RX ORDER — ACETAMINOPHEN 325 MG/1
650 TABLET ORAL EVERY 6 HOURS
Status: DISCONTINUED | OUTPATIENT
Start: 2025-06-03 | End: 2025-06-03 | Stop reason: HOSPADM

## 2025-06-03 RX ORDER — NALOXONE HYDROCHLORIDE 0.4 MG/ML
INJECTION, SOLUTION INTRAMUSCULAR; INTRAVENOUS; SUBCUTANEOUS PRN
Status: DISCONTINUED | OUTPATIENT
Start: 2025-06-03 | End: 2025-06-03 | Stop reason: HOSPADM

## 2025-06-03 RX ORDER — GLUCAGON 1 MG/ML
1 KIT INJECTION PRN
Status: DISCONTINUED | OUTPATIENT
Start: 2025-06-03 | End: 2025-06-03 | Stop reason: HOSPADM

## 2025-06-03 RX ORDER — ACETAMINOPHEN 500 MG
1000 TABLET ORAL ONCE
Status: COMPLETED | OUTPATIENT
Start: 2025-06-03 | End: 2025-06-03

## 2025-06-03 RX ORDER — FAMOTIDINE 20 MG/1
10 TABLET, FILM COATED ORAL DAILY
Status: DISCONTINUED | OUTPATIENT
Start: 2025-06-03 | End: 2025-06-03

## 2025-06-03 RX ORDER — BUPIVACAINE HYDROCHLORIDE 5 MG/ML
INJECTION, SOLUTION EPIDURAL; INTRACAUDAL PRN
Status: DISCONTINUED | OUTPATIENT
Start: 2025-06-03 | End: 2025-06-03 | Stop reason: ALTCHOICE

## 2025-06-03 RX ORDER — CARVEDILOL 6.25 MG/1
6.25 TABLET ORAL 2 TIMES DAILY
Status: DISCONTINUED | OUTPATIENT
Start: 2025-06-03 | End: 2025-06-03 | Stop reason: HOSPADM

## 2025-06-03 RX ORDER — ONDANSETRON 2 MG/ML
4 INJECTION INTRAMUSCULAR; INTRAVENOUS EVERY 6 HOURS PRN
Status: DISCONTINUED | OUTPATIENT
Start: 2025-06-03 | End: 2025-06-03 | Stop reason: HOSPADM

## 2025-06-03 RX ORDER — ONDANSETRON 2 MG/ML
INJECTION INTRAMUSCULAR; INTRAVENOUS
Status: DISCONTINUED | OUTPATIENT
Start: 2025-06-03 | End: 2025-06-03 | Stop reason: SDUPTHER

## 2025-06-03 RX ORDER — HYDROMORPHONE HYDROCHLORIDE 1 MG/ML
0.5 INJECTION, SOLUTION INTRAMUSCULAR; INTRAVENOUS; SUBCUTANEOUS EVERY 5 MIN PRN
Status: DISCONTINUED | OUTPATIENT
Start: 2025-06-03 | End: 2025-06-03 | Stop reason: HOSPADM

## 2025-06-03 RX ORDER — DEXTROSE MONOHYDRATE 100 MG/ML
INJECTION, SOLUTION INTRAVENOUS CONTINUOUS PRN
Status: DISCONTINUED | OUTPATIENT
Start: 2025-06-03 | End: 2025-06-03 | Stop reason: HOSPADM

## 2025-06-03 RX ORDER — TRAMADOL HYDROCHLORIDE 50 MG/1
50 TABLET ORAL EVERY 8 HOURS PRN
Status: DISCONTINUED | OUTPATIENT
Start: 2025-06-03 | End: 2025-06-03 | Stop reason: HOSPADM

## 2025-06-03 RX ORDER — DIPHENHYDRAMINE HCL 25 MG
25 CAPSULE ORAL EVERY 6 HOURS PRN
Status: DISCONTINUED | OUTPATIENT
Start: 2025-06-03 | End: 2025-06-03 | Stop reason: HOSPADM

## 2025-06-03 RX ORDER — SODIUM CHLORIDE 9 MG/ML
INJECTION, SOLUTION INTRAVENOUS CONTINUOUS
Status: DISCONTINUED | OUTPATIENT
Start: 2025-06-03 | End: 2025-06-03

## 2025-06-03 RX ORDER — POLYETHYLENE GLYCOL 3350 17 G/17G
17 POWDER, FOR SOLUTION ORAL DAILY
Status: DISCONTINUED | OUTPATIENT
Start: 2025-06-03 | End: 2025-06-03 | Stop reason: HOSPADM

## 2025-06-03 RX ORDER — DEXAMETHASONE SODIUM PHOSPHATE 4 MG/ML
INJECTION, SOLUTION INTRA-ARTICULAR; INTRALESIONAL; INTRAMUSCULAR; INTRAVENOUS; SOFT TISSUE
Status: DISCONTINUED | OUTPATIENT
Start: 2025-06-03 | End: 2025-06-03 | Stop reason: SDUPTHER

## 2025-06-03 RX ORDER — PANTOPRAZOLE SODIUM 40 MG/1
40 TABLET, DELAYED RELEASE ORAL
Qty: 30 TABLET | Refills: 0 | Status: SHIPPED | OUTPATIENT
Start: 2025-06-04 | End: 2025-07-04

## 2025-06-03 RX ORDER — TRAMADOL HYDROCHLORIDE 50 MG/1
50 TABLET ORAL EVERY 8 HOURS PRN
Qty: 20 TABLET | Refills: 0 | Status: SHIPPED | OUTPATIENT
Start: 2025-06-03 | End: 2025-06-10

## 2025-06-03 RX ORDER — PHENYLEPHRINE HYDROCHLORIDE 10 MG/ML
INJECTION INTRAVENOUS
Status: DISCONTINUED
Start: 2025-06-03 | End: 2025-06-03 | Stop reason: HOSPADM

## 2025-06-03 RX ORDER — MORPHINE SULFATE 2 MG/ML
2 INJECTION, SOLUTION INTRAMUSCULAR; INTRAVENOUS
Status: DISCONTINUED | OUTPATIENT
Start: 2025-06-03 | End: 2025-06-03 | Stop reason: HOSPADM

## 2025-06-03 RX ADMIN — ACETAMINOPHEN 1000 MG: 500 TABLET ORAL at 07:52

## 2025-06-03 RX ADMIN — ACETAMINOPHEN 650 MG: 325 TABLET ORAL at 15:02

## 2025-06-03 RX ADMIN — SODIUM CHLORIDE, SODIUM LACTATE, POTASSIUM CHLORIDE, AND CALCIUM CHLORIDE: .6; .31; .03; .02 INJECTION, SOLUTION INTRAVENOUS at 08:22

## 2025-06-03 RX ADMIN — MEPIVACAINE HYDROCHLORIDE 2.5 ML: 20 INJECTION, SOLUTION EPIDURAL; INFILTRATION at 08:36

## 2025-06-03 RX ADMIN — WATER 2000 MG: 1 INJECTION INTRAMUSCULAR; INTRAVENOUS; SUBCUTANEOUS at 08:52

## 2025-06-03 RX ADMIN — PROPOFOL 20 MG: 10 INJECTION, EMULSION INTRAVENOUS at 08:48

## 2025-06-03 RX ADMIN — PROPOFOL 20 MG: 10 INJECTION, EMULSION INTRAVENOUS at 08:30

## 2025-06-03 RX ADMIN — PROPOFOL 75 MCG/KG/MIN: 10 INJECTION, EMULSION INTRAVENOUS at 08:51

## 2025-06-03 RX ADMIN — ONDANSETRON 4 MG: 2 INJECTION INTRAMUSCULAR; INTRAVENOUS at 08:46

## 2025-06-03 RX ADMIN — CELECOXIB 400 MG: 200 CAPSULE ORAL at 07:52

## 2025-06-03 RX ADMIN — HYDROMORPHONE HYDROCHLORIDE 0.3 MG: 1 INJECTION, SOLUTION INTRAMUSCULAR; INTRAVENOUS; SUBCUTANEOUS at 10:10

## 2025-06-03 RX ADMIN — PROPOFOL 20 MG: 10 INJECTION, EMULSION INTRAVENOUS at 08:50

## 2025-06-03 RX ADMIN — DEXAMETHASONE SODIUM PHOSPHATE 8 MG: 4 INJECTION INTRA-ARTICULAR; INTRALESIONAL; INTRAMUSCULAR; INTRAVENOUS; SOFT TISSUE at 08:52

## 2025-06-03 RX ADMIN — PROPOFOL 20 MG: 10 INJECTION, EMULSION INTRAVENOUS at 09:14

## 2025-06-03 RX ADMIN — TRAMADOL HYDROCHLORIDE 50 MG: 50 TABLET, COATED ORAL at 12:20

## 2025-06-03 RX ADMIN — BUPIVACAINE HYDROCHLORIDE 20 ML: 5 INJECTION, SOLUTION EPIDURAL; INTRACAUDAL; PERINEURAL at 08:41

## 2025-06-03 RX ADMIN — TRANEXAMIC ACID 500 MG: 100 INJECTION, SOLUTION INTRAVENOUS at 08:52

## 2025-06-03 RX ADMIN — BUPIVACAINE 266 MG: 13.3 INJECTION, SUSPENSION, LIPOSOMAL INFILTRATION at 08:41

## 2025-06-03 RX ADMIN — EPHEDRINE SULFATE 10 MG: 5 INJECTION INTRAVENOUS at 08:45

## 2025-06-03 RX ADMIN — PHENYLEPHRINE HYDROCHLORIDE 20 MCG/MIN: 10 INJECTION INTRAVENOUS at 08:46

## 2025-06-03 ASSESSMENT — PAIN SCALES - GENERAL: PAINLEVEL_OUTOF10: 0

## 2025-06-03 ASSESSMENT — PAIN - FUNCTIONAL ASSESSMENT: PAIN_FUNCTIONAL_ASSESSMENT: 0-10

## 2025-06-03 ASSESSMENT — PAIN DESCRIPTION - DESCRIPTORS: DESCRIPTORS: ACHING

## 2025-06-03 NOTE — DISCHARGE SUMMARY
Ortho Discharge Summary    Patient ID:  Esvin Ferreira  [id number]  male  89 y.o.  6/17/1935    Admit date: 6/3/2025    Discharge date: 6/3/2025    Admitting Physician: Rob Andrea MD     Consulting Physician(s):   Treatment Team:   Rob Andrea MD Harris, Barton, MD Womble, Alicia R, PT  Shasha Berg RN Pendergraph, Alicia, RN Blair, Juli    Date of Surgery:   6/3/2025     Preoperative Diagnosis:  Primary osteoarthritis of left knee [M17.12]    Postoperative Diagnosis:   * No post-op diagnosis entered *    Procedure(s):   LEFT TOTAL KNEE ARTHROPLASTY (GABY)     Anesthesia Type:   Choice     Surgeon: Rob Andrea MD                            HPI:  Pt is a 89 y.o. male who has a history of Primary osteoarthritis of left knee [M17.12]  with pain and limitations of activities of daily living who presents at this time for a  LEFT TOTAL KNEE ARTHROPLASTY (GABY) following the failure of conservative management.    PMH:   Past Medical History:   Diagnosis Date    Acute systolic congestive heart failure (HCC) 8/5/2021    Arthritis     severe diffuse DJD    Calculus of kidney 1988    2 bouts    Cancer (HCC)     prostate    Chronic kidney disease 2010 urinary obstruction diagnosed    due to obstructive uropathy and ?NSAID use long term    Disorder of aorta     f/b Dr Stoddard every year- last seen in 11/2024 (ascending aorta 4.5 cm)    GERD (gastroesophageal reflux disease) 6/2009    with mild Barnard's    GERD (gastroesophageal reflux disease)     Hematuria, microscopic 1970's ?    negative cysto and imaging    Mixed hyperlipidemia 8/5/2021    Murmur     Other ill-defined conditions(799.89) 2006    blood transfusion hx    Peptic ulcer     \"long time ago\"    Perennial allergic rhinitis     mold --per Dr. Edwards    Primary hypertension 09/18/2023    PUD (peptic ulcer disease) 2006    stomach    Syncope 8/21/2011    UTI (urinary tract infection)        Body mass index is 23.19 kg/m². : A BMI > 30 is

## 2025-06-03 NOTE — PERIOP NOTE
07:32= brought to Pre OP via wheelchair; assisted to scale then ambulated using cane to room; A&Ox4, consents verified (surgery, anesthesia). States he adhered to [facility] guidelines for cleansing and drinking Ensure; changed into gown with NO CHG; did not have to void; no mepilex applied d/t spinal; warming blanket applied.     07:35= Dr. Andrea in to discuss surgery.    08:00= Dr. Fuller in to discuss anesthesia.    08:20= ready for OR; music therapy (50s) playing to calm prior to surgery.    08:22= timeout performed with Dr. Fuller for spinal; 2LO2NC placed on pt and frequent VS started.    08:29= timeout performed with Dr. Fuller for left knee nerve block; 2LO2NC remains on pt and frequent VS cycling.

## 2025-06-03 NOTE — PLAN OF CARE
Problem: Chronic Conditions and Co-morbidities  Goal: Patient's chronic conditions and co-morbidity symptoms are monitored and maintained or improved  Outcome: Progressing     Problem: Pain  Goal: Verbalizes/displays adequate comfort level or baseline comfort level  Outcome: Progressing     
3  Entrance Stairs - Rails: Left  Home Equipment: Walker - Rolling, Cane  Has the patient had two or more falls in the past year or any fall with injury in the past year?: No (1)  Prior Level of Assist for ADLs: Independent  Prior Level of Assist for Homemaking: Independent  Prior Level of Assist for Ambulation: Independent community ambulator, with or without device  Prior Level of Assist for Transfers: Independent  Active : Yes  Mode of Transportation: Car    Cognitive/Behavioral Status:  Orientation  Overall Orientation Status: Within Normal Limits  Orientation Level: Oriented X4  Cognition  Overall Cognitive Status: Exceptions  Arousal/Alertness: Appears intact  Following Commands: Appears intact  Attention Span: Appears intact  Memory: Appears intact  Safety Judgement: Decreased awareness of need for assistance  Problem Solving: Impaired  Insights: Decreased awareness of deficits  Initiation: Appears intact  Sequencing: Appears intact    Hearing: WFL       Vision/Perceptual:          Vision  Vision: Impaired  Vision Exceptions: Wears glasses at all times           Strength:    Strength: Generally decreased, functional    Tone & Sensation:   Tone: Normal  Sensation: Intact    Coordination:  Coordination: Generally decreased, functional    Range Of Motion:  AROM: Generally decreased, functional       Functional Mobility:  Bed Mobility:     Bed Mobility Training  Bed Mobility Training: Yes  Interventions: Verbal cues  Supine to Sit: Stand by assistance  Sit to Supine: Stand by assistance  Scooting: Stand by assistance  Transfers:     Transfer Training  Transfer Training: Yes  Interventions: Verbal cues;Safety awareness training  Sit to Stand: Contact guard assistance  Stand to Sit: Contact guard assistance  Stand Pivot Transfers: Contact guard assistance  Car Transfer: Stand by assistance  Balance:               Balance  Sitting: Intact  Standing: Impaired  Standing - Static: Good;Constant support  Standing

## 2025-06-03 NOTE — CARE COORDINATION
CM acknowledged receipt of consult to arrange home health, pt's insurance may be barrier to agency choice/ availability, CM sending multiple referrals to determine insurance coverage and service availability, will follow up with pt for preference between acceptances. Per chart review, pt has own RW for home use. CM following for additions or changes to discharge plan.      Zakiya Alvarado, [facility]  Care Management  [phone]

## 2025-06-03 NOTE — OP NOTE
Rob Andrea MD - Adult Reconstruction and Total Joint Replacement    Esvin Ferreira - MRN [id number] - : 1935 (89 y.o.)    Date: 6/3/2025    PREOPERATIVE DIAGNOSIS:  Left knee degenerative joint disease    POSTOPERATIVE DIAGNOSIS:  Same    PROCEDURE: Total knee replacement with imageless computer navigation using [employer] robot    Implants Used:   Implant Name Type Inv. Item Serial No.  Lot No. LRB No. Used Action   COMPONENT FEM SZ 5 L KNEE JO APATITE CRUCE RET CEMENTLESS - SNA  COMPONENT FEM SZ 5 L KNEE JO APATITE CRUCE RET CEMENTLESS NA [employer] [facility]- [id number] Left 1 Implanted   BASEPLATE TIB SZ 6 AP52MM ML77MM KNEE TRITANIUM 4 CRUCFRM - SNA  BASEPLATE TIB SZ 6 AP52MM ML77MM KNEE TRITANIUM 4 CRUCFRM NA [employer] [facility]- [id number] Left 1 Implanted   INSERT TIB CS 6 10 MM ARTC POST KNEE BEAR TECHNOLOGY X3 - SNA  INSERT TIB CS 6 10 MM ARTC POST KNEE BEAR TECHNOLOGY X3 NA [employer] [facility]- [id number] Left 1 Implanted       Anesthesia: spinal + block / local    Pre-operative antibiotic: Ancef    Surgeon: Rob Andrea     Assist: EDSON Ann (Performing all or most of the following assistant-at-surgery services including but not limited to: proper patient positioning, sterile/prep and draping, placement of instruments/trackers, operative exposure, minor portions of bone / soft tissue excision, final irrigation and debridement, deep and superficial closure, application of final dressings)    EBL: minimal    Drains: none     Specimens: none     Complications: none     Condition: stable to PACU     INDICATIONS: Longstanding knee pain unresponsive to conservative measures. The risks, benefits, and alternatives to the procedure were explained to the patient and they wished to proceed. They understood no guarantees could be given about the outcome of the procedure.    DESCRIPTION OF PROCEDURE:  The patient was

## 2025-06-03 NOTE — PROGRESS NOTES
Ortho / Neurosurgery NP Note    POD# 0  s/p LEFT TOTAL KNEE ARTHROPLASTY (GABY)   Pt seen with no visitor present    Patient in bed. Recent arrival from PACU  Reports mild post op pain.   Tolerating clears. Denies nausea  Able to void in PACU  Lives alone, has son who is very supportive. Discussed with patient needing assistance once discharged.   Hx of GERD, PUD. Will take Protonix w ASA   Hx of chronic pain, takes Tramadol 2-3 per day PTA  Very eager to DC home today-discussed DC criteria    VSS Afebrile.    Visit Vitals  /89   Pulse 73   Temp 97.7 °F (36.5 °C)   Resp 16   Ht 1.778 m (5' 10\")   Wt 73.3 kg (161 lb 9.6 oz)   SpO2 96%   BMI 23.19 kg/m²       Voiding status: voiding            Labs    Lab Results   Component Value Date/Time    HGB 13.9 05/27/2025 08:22 AM      Lab Results   Component Value Date/Time    INR 1.2 05/27/2025 08:22 AM      Lab Results   Component Value Date/Time     05/27/2025 08:22 AM    K 4.1 05/27/2025 08:22 AM     05/27/2025 08:22 AM    CO2 22 05/27/2025 08:22 AM    BUN 43 05/27/2025 08:22 AM     Recent Glucose Results:   Glucose   Date Value Ref Range Status   05/27/2025 98 65 - 100 mg/dL Final   03/25/2025 104 (H) 65 - 100 mg/dL Final   07/23/2024 107 (H) 65 - 100 mg/dL Final           Body mass index is 23.19 kg/m². : A BMI > 30 is classified as obesity and > 40 is classified as morbid obesity.     Awake and alert. No acute distress.    Dressing: Ace Wrap C.D.I.   No significant erythema or swelling  Cryotherapy in place over incision.   Calves soft and supple; No pain with passive stretch    BLE sensation to light touch intact  BLE motor intact.  +PF/DF/EHL intact Strength 5/5    SCD for mechanical DVT proph while in bed        PLAN:  1) PT  - WBAT.   2) DVT Prophylaxis: Aspirin 81 mg BID   3) GI Prophylaxis - Protonix   4) Pain control - scheduled tylenol  , and prn  tramadol  . Continue cryotherapy  5) Readiness for discharge:     [x] Vital Signs stable    []

## 2025-06-03 NOTE — ANESTHESIA PRE PROCEDURE
LABANTI NEG 03/08/2010       Drug/Infectious Status (If Applicable):  No results found for: \"HIV\", \"HEPCAB\"    COVID-19 Screening (If Applicable): No results found for: \"COVID19\"        Anesthesia Evaluation  Patient summary reviewed and Nursing notes reviewed   no history of anesthetic complications:   Airway: Mallampati: II  TM distance: >3 FB   Neck ROM: full  Mouth opening: > = 3 FB   Dental: normal exam         Pulmonary:normal exam                              ROS comment: Former smoker   Cardiovascular:    (+) hypertension:, CHF:, hyperlipidemia      ECG reviewed      Echocardiogram reviewed               ROS comment: Ascending aortic aneurysm (4.5cm on last visit 11/2024)     Neuro/Psych:               GI/Hepatic/Renal:   (+) GERD:, PUD, renal disease: CRI          Endo/Other: Negative Endo/Other ROS                    Abdominal:             Vascular: negative vascular ROS.         Other Findings: Abdominal exam deferred            Anesthesia Plan      MAC, regional and spinal     ASA 3       Induction: intravenous.    MIPS: Postoperative opioids intended and Prophylactic antiemetics administered.  Anesthetic plan and risks discussed with patient.    Use of blood products discussed with patient whom consented to blood products.    Plan discussed with CRNA.    Attending anesthesiologist reviewed and agrees with Preprocedure content      Post-op pain plan if not by surgeon: regional and single peripheral nerve block            Andrey Fuller MD   6/3/2025

## 2025-06-03 NOTE — H&P
Date of Surgery Update:  Esvin Ferreira was seen and examined on the day of surgery prior to the procedure.    There were no significant clinical changes since the completion of the History and Physical.    Exam today prior to surgery showed no acute cardiac findings, no respiratory difficulty, and no abdominal complaints or pain.     Documentation of Medical Necessity:    Symptoms: pain with activity and at rest, antalgia, interferes with ADLs    Conservative Treatment: activity modification, multiple medications, injection    Physical Findings: varus, pain at joint line, antalgia on ambulation, crepitation    See PCP/Cardiology/PAT/Anesthesia record for cardiopulmonary evaluation.    Imaging: significant OA, sclerosis and osteophytes, varus    Indications:   Failure of conservative treatments with daily pain and functional limitations.   Appropriate imaging demonstrating significant disease.  Appropriate physical findings consistent with significant degenerative joint disease.    All pertinent risks, benefits, and alternatives to operative management including continued conservative care were explained at length. The patient has elected to proceed with appropriately indicated and medically necessary total joint arthroplasty. They understand no guarantees can be given about the outcome.      Signed By: EDSON Ann     Yvonne 3, 2025 7:44 AM

## 2025-06-03 NOTE — ANESTHESIA PROCEDURE NOTES
Spinal Block    Patient location during procedure: holding area  End time: 6/3/2025 8:44 AM  Reason for block: primary anesthetic and at surgeon's request  Staffing  Performed: anesthesiologist   Anesthesiologist: Andrey Fuller MD  Performed by: Andrey Fuller MD  Authorized by: Andrey Fuller MD    Spinal Block  Patient position: sitting  Prep: ChloraPrep and site prepped and draped  Patient monitoring: cardiac monitor, continuous pulse ox, continuous capnometry, frequent blood pressure checks and oxygen  Approach: midline  Location: L3/L4  Provider prep: sterile gloves and mask  Local infiltration: lidocaine  Needle  Needle type: Quincke   Needle gauge: 22 G  Needle length: 3.5 in  Assessment  Swirl obtained: Yes  CSF: clear  Attempts: 1  Hemodynamics: stable  Preanesthetic Checklist  Completed: patient identified, IV checked, site marked, risks and benefits discussed, surgical/procedural consents, equipment checked, pre-op evaluation, timeout performed, anesthesia consent given, oxygen available, monitors applied/VS acknowledged, fire risk safety assessment completed and verbalized and blood product R/B/A discussed and consented

## 2025-06-03 NOTE — ANESTHESIA POSTPROCEDURE EVALUATION
Department of Anesthesiology  Postprocedure Note    Patient: Esvin Ferreira  MRN: [id number]  YOB: 1935  Date of evaluation: 6/3/2025    Procedure Summary       Date: 06/03/25 Room / Location: [facility] MAIN OR  / [facility] MAIN OR    Anesthesia Start: 0845 Anesthesia Stop: 1040    Procedure: LEFT TOTAL KNEE ARTHROPLASTY (GABY) (Left: Knee) Diagnosis:       Primary osteoarthritis of left knee      (Primary osteoarthritis of left knee [M17.12])    Providers: Rob Andrea MD Responsible Provider: Andrey Fuller MD    Anesthesia Type: MAC, Spinal, Regional ASA Status: 3            Anesthesia Type: MAC, Spinal, Regional    Chris Phase I: Chris Score: 9    Chris Phase II:      Anesthesia Post Evaluation    Patient location during evaluation: PACU  Patient participation: complete - patient participated  Level of consciousness: awake and alert  Airway patency: patent  Nausea & Vomiting: no nausea  Cardiovascular status: hemodynamically stable  Respiratory status: acceptable  Hydration status: euvolemic  Multimodal analgesia pain management approach  Pain management: adequate    No notable events documented.

## 2025-06-03 NOTE — ANESTHESIA PROCEDURE NOTES
Peripheral Block    Patient location during procedure: holding area  Reason for block: post-op pain management and at surgeon's request  Start time: 6/3/2025 8:30 AM  End time: 6/3/2025 8:44 AM  Staffing  Performed: anesthesiologist   Anesthesiologist: Andrey Fuller MD  Performed by: Andrey Fuller MD  Authorized by: Andrey Fuller MD    Preanesthetic Checklist  Completed: patient identified, IV checked, site marked, risks and benefits discussed, surgical/procedural consents, equipment checked, pre-op evaluation, timeout performed, anesthesia consent given, oxygen available, monitors applied/VS acknowledged, fire risk safety assessment completed and verbalized and blood product R/B/A discussed and consented  Peripheral Block   Patient position: supine  Prep: ChloraPrep  Provider prep: mask and sterile gloves  Patient monitoring: cardiac monitor, continuous pulse ox, continuous capnometry, frequent blood pressure checks, IV access, oxygen and responsive to questions  Block type: Femoral (and superomedial, inferomedial, and superolateral genicular nerve blocks)  Adductor canal  Laterality: left  Injection technique: single-shot  Guidance: nerve stimulator and ultrasound guided    Needle   Needle type: insulated echogenic nerve stimulator needle   Needle gauge: 20 G  Needle localization: ultrasound guidance and nerve stimulator  Needle length: 10 cm  Assessment   Injection assessment: negative aspiration for heme, no paresthesia on injection, local visualized surrounding nerve on ultrasound and no intravascular symptoms  Paresthesia pain: none  Slow fractionated injection: yes  Hemodynamics: stable  Outcomes: uncomplicated and patient tolerated procedure well

## 2025-06-03 NOTE — H&P
Rob Andrea MD - Adult Reconstruction and Total Joint Replacement     Orthopaedic History and Physical        NAME: Esvin Ferreira       :  1935       MRN:  [id number]      Voice recognition software is utilized and this note may contain transcription errors. An addendum can be issued upon request.    HISTORY OF PRESENT ILLNESS:  Chief Complaint: Pain of the Left Knee Age: 89 y.o.  Sex: male  Hand-dominance: right    History of present illness: Mr. Ferreira presents to clinic complaining of left knee pain. He has had left knee pain for years. He complains of pain mostly on the medial side of the knee and around the patella. He has pain with any type of ambulation. He has been using a brace for the past 4 months because he feels like his knee is unstable and will give out on him. He also uses a cane to help him ambulate. He has difficulty with mechanical movements. He had his right knee replaced about 16 years ago.    Patient Active Problem List    Diagnosis Date Noted    H/O prostate cancer 2024    Chronic systolic (congestive) heart failure (HCC) 2024    IFG (impaired fasting glucose) 2023    Primary hypertension 2023    Opioid use, unspecified with unspecified opioid-induced disorder ([facility]ly [facility]) 2021    Mixed hyperlipidemia 2021    Dilated aortic root 2021    Primary osteoarthritis of both hands 2018    Chronic kidney disease, stage III (moderate) ([facility]) 2011    Vitamin D deficiency 2011    Perennial allergic rhinitis 2010    DJD (degenerative joint disease), multiple sites 2010    GERD (gastroesophageal reflux disease) 2010    Benign prostatic hyperplasia 2010    History of peptic ulcer disease 2010     Past Medical History:   Diagnosis Date    Acute systolic congestive heart failure (HCC) 2021    Arthritis     severe diffuse DJD    Calculus of kidney 1988    2 bouts    Cancer ([facility])     prostate    Chronic kidney

## 2025-06-03 NOTE — FLOWSHEET NOTE
06/03/25 1141   Handoff   Communication Given Periop Handoff/Relief   Handoff phase Phase I receiving   Handoff Given To Mirta KNUTSON   Handoff Received From Brittaney PARRA & Sherri RN   Handoff Communication Face to Face   Time Handoff Given 1038         1040 BP < 90 HR 58 96% on RA, pt reports numbness BLE, no c/o pain or discomfort at present, restarted jose gtt 30 mcg IV  ephedrine 5mg per anesthesia staff see mar.    1050 jose gtt stopped 113/71 HR 61. BLE numbness present & low temp   1100 Dr. Fuller f/u with patient in pacu.    1133 room assignment pending, patient's son Syd updated via cell phone & also spoke with patient.       1348      TRANSFER - OUT REPORT:    Verbal report given to Shasha KNUTSON  on Esvin Ferreira  being transferred to ortho (unit) for routine post-op       Report consisted of patient’s Situation, Background, Assessment and   Recommendations(SBAR).     Information from the following report(s) Surgery Report, Intake/Output, MAR, and Cardiac Rhythm SR  was reviewed with the receiving nurse.    Opportunity for questions and clarification was provided.      Patient transported with:   Tech    Lines:     This SmartLink retrieves the last documented value for LDA assessment data, retrieved by either LDA type or by LDA group ID.     This SmartLink should be used in a SmartText or SmartPhrase. If one is not available, please contact your .

## 2025-06-03 NOTE — PROGRESS NOTES
DISCHARGE NOTE FROM ORTHO NURSE    Patient determined to be stable for discharge by attending provider. I have reviewed the discharge instructions with the patient. They verbalized understanding and all questions were answered to their satisfaction. No complaints or further questions were expressed.      Medications sent to pharmacy. Appropriate educational materials and medication side effect teaching were provided.      PIV were removed prior to discharge.     Patient did not discharge with any line, ayala, or drain.    Personal items and valuables accounted for at discharge by patient and/or family: Yes    Post-op patient: Yes-Patient given post-op discharge kit and instructed on use.    Yahaira Mcneill RN

## 2025-06-03 NOTE — PROGRESS NOTES
Patient has RW for home use.  Son at bedside.    Discussed the importance of using pain medication and other methods for pain control such as ice/rest/elevate, pre-medicating prior to physical therapy.  Discussed the importance of being safe at hospital and home by using RW until cleared by PT, wearing safe shoes, preparing home for after surgery and having a  to help at home.  Discussed the importance of home PT, daily exercises as instructed by physical therapist and post-op appointment with surgeon and the need for transportation to this appointment until the surgeon has cleared you for driving.    Discussed risk after surgery and the importance of preventing infection by good hand hygiene, using clean towels and wash cloths at each shower, inspect wound/dressing daily, moving every two hours while awake, using the incentive spirometer every hour while awake.  When to call the doctor for help, or when to call 911 for emergency.  Opportunity given for patient/family to ask additional questions about any special concerns regarding your care while on the Ortho unit and preparing for discharge.

## 2025-08-06 DIAGNOSIS — N18.31 STAGE 3A CHRONIC KIDNEY DISEASE (HCC): ICD-10-CM

## 2025-08-07 LAB
ALBUMIN SERPL-MCNC: 3.6 G/DL (ref 3.5–5.2)
ALBUMIN/GLOB SERPL: 1.3 (ref 1.1–2.2)
ALP SERPL-CCNC: 97 U/L (ref 40–129)
ALT SERPL-CCNC: 11 U/L (ref 10–50)
ANION GAP SERPL CALC-SCNC: 12 MMOL/L (ref 2–14)
AST SERPL-CCNC: 17 U/L (ref 10–50)
BILIRUB SERPL-MCNC: 0.6 MG/DL (ref 0–1.2)
BUN SERPL-MCNC: 37 MG/DL (ref 8–23)
BUN/CREAT SERPL: 22 (ref 12–20)
CALCIUM SERPL-MCNC: 9.2 MG/DL (ref 8.2–9.6)
CHLORIDE SERPL-SCNC: 109 MMOL/L (ref 98–107)
CO2 SERPL-SCNC: 20 MMOL/L (ref 20–29)
CREAT SERPL-MCNC: 1.65 MG/DL (ref 0.7–1.2)
GLOBULIN SER CALC-MCNC: 2.7 G/DL (ref 2–4)
GLUCOSE SERPL-MCNC: 69 MG/DL (ref 65–100)
POTASSIUM SERPL-SCNC: 5.2 MMOL/L (ref 3.5–5.1)
PROT SERPL-MCNC: 6.3 G/DL (ref 6.4–8.3)
SODIUM SERPL-SCNC: 141 MMOL/L (ref 136–145)

## 2025-08-12 ENCOUNTER — OFFICE VISIT (OUTPATIENT)
Age: 89
End: 2025-08-12
Payer: MEDICARE

## 2025-08-12 VITALS
SYSTOLIC BLOOD PRESSURE: 120 MMHG | HEIGHT: 70 IN | OXYGEN SATURATION: 93 % | HEART RATE: 73 BPM | WEIGHT: 162.13 LBS | TEMPERATURE: 97.2 F | BODY MASS INDEX: 23.21 KG/M2 | DIASTOLIC BLOOD PRESSURE: 76 MMHG

## 2025-08-12 DIAGNOSIS — E78.2 MIXED HYPERLIPIDEMIA: ICD-10-CM

## 2025-08-12 DIAGNOSIS — N18.31 STAGE 3A CHRONIC KIDNEY DISEASE (HCC): ICD-10-CM

## 2025-08-12 DIAGNOSIS — M15.0 PRIMARY OSTEOARTHRITIS INVOLVING MULTIPLE JOINTS: ICD-10-CM

## 2025-08-12 DIAGNOSIS — I50.22 CHRONIC SYSTOLIC (CONGESTIVE) HEART FAILURE (HCC): ICD-10-CM

## 2025-08-12 DIAGNOSIS — I77.810 DILATED AORTIC ROOT: Primary | ICD-10-CM

## 2025-08-12 DIAGNOSIS — Z85.46 H/O PROSTATE CANCER: ICD-10-CM

## 2025-08-12 DIAGNOSIS — J30.89 PERENNIAL ALLERGIC RHINITIS: ICD-10-CM

## 2025-08-12 DIAGNOSIS — I10 PRIMARY HYPERTENSION: ICD-10-CM

## 2025-08-12 DIAGNOSIS — M19.041 PRIMARY OSTEOARTHRITIS OF BOTH HANDS: ICD-10-CM

## 2025-08-12 DIAGNOSIS — R73.01 IFG (IMPAIRED FASTING GLUCOSE): ICD-10-CM

## 2025-08-12 DIAGNOSIS — M19.042 PRIMARY OSTEOARTHRITIS OF BOTH HANDS: ICD-10-CM

## 2025-08-12 DIAGNOSIS — K21.9 GASTROESOPHAGEAL REFLUX DISEASE WITHOUT ESOPHAGITIS: ICD-10-CM

## 2025-08-12 PROCEDURE — 1123F ACP DISCUSS/DSCN MKR DOCD: CPT | Performed by: INTERNAL MEDICINE

## 2025-08-12 PROCEDURE — 99214 OFFICE O/P EST MOD 30 MIN: CPT | Performed by: INTERNAL MEDICINE

## 2025-08-12 PROCEDURE — 1160F RVW MEDS BY RX/DR IN RCRD: CPT | Performed by: INTERNAL MEDICINE

## 2025-08-12 PROCEDURE — 1159F MED LIST DOCD IN RCRD: CPT | Performed by: INTERNAL MEDICINE

## 2025-08-12 ASSESSMENT — PATIENT HEALTH QUESTIONNAIRE - PHQ9
SUM OF ALL RESPONSES TO PHQ QUESTIONS 1-9: 0
SUM OF ALL RESPONSES TO PHQ QUESTIONS 1-9: 0
1. LITTLE INTEREST OR PLEASURE IN DOING THINGS: NOT AT ALL
SUM OF ALL RESPONSES TO PHQ QUESTIONS 1-9: 0
2. FEELING DOWN, DEPRESSED OR HOPELESS: NOT AT ALL
SUM OF ALL RESPONSES TO PHQ QUESTIONS 1-9: 0

## 2025-08-12 ASSESSMENT — ENCOUNTER SYMPTOMS: SHORTNESS OF BREATH: 0
